# Patient Record
Sex: MALE | Race: ASIAN | NOT HISPANIC OR LATINO | URBAN - METROPOLITAN AREA
[De-identification: names, ages, dates, MRNs, and addresses within clinical notes are randomized per-mention and may not be internally consistent; named-entity substitution may affect disease eponyms.]

---

## 2020-01-14 ENCOUNTER — EMERGENCY (EMERGENCY)
Facility: HOSPITAL | Age: 80
LOS: 1 days | Discharge: HOME | End: 2020-01-14
Attending: EMERGENCY MEDICINE | Admitting: EMERGENCY MEDICINE
Payer: MEDICAID

## 2020-01-14 VITALS
HEART RATE: 81 BPM | RESPIRATION RATE: 17 BRPM | TEMPERATURE: 98 F | OXYGEN SATURATION: 99 % | DIASTOLIC BLOOD PRESSURE: 66 MMHG | SYSTOLIC BLOOD PRESSURE: 169 MMHG

## 2020-01-14 VITALS
TEMPERATURE: 98 F | RESPIRATION RATE: 16 BRPM | HEART RATE: 89 BPM | OXYGEN SATURATION: 100 % | DIASTOLIC BLOOD PRESSURE: 75 MMHG | SYSTOLIC BLOOD PRESSURE: 176 MMHG

## 2020-01-14 LAB
ALBUMIN SERPL ELPH-MCNC: 3.9 G/DL — SIGNIFICANT CHANGE UP (ref 3.3–5)
ALP SERPL-CCNC: 64 U/L — SIGNIFICANT CHANGE UP (ref 40–120)
ALT FLD-CCNC: 6 U/L — SIGNIFICANT CHANGE UP (ref 4–41)
ANION GAP SERPL CALC-SCNC: 17 MMO/L — HIGH (ref 7–14)
AST SERPL-CCNC: 13 U/L — SIGNIFICANT CHANGE UP (ref 4–40)
BASOPHILS # BLD AUTO: 0.03 K/UL — SIGNIFICANT CHANGE UP (ref 0–0.2)
BASOPHILS NFR BLD AUTO: 0.5 % — SIGNIFICANT CHANGE UP (ref 0–2)
BILIRUB SERPL-MCNC: 0.4 MG/DL — SIGNIFICANT CHANGE UP (ref 0.2–1.2)
BUN SERPL-MCNC: 13 MG/DL — SIGNIFICANT CHANGE UP (ref 7–23)
CALCIUM SERPL-MCNC: 9.5 MG/DL — SIGNIFICANT CHANGE UP (ref 8.4–10.5)
CHLORIDE SERPL-SCNC: 100 MMOL/L — SIGNIFICANT CHANGE UP (ref 98–107)
CO2 SERPL-SCNC: 23 MMOL/L — SIGNIFICANT CHANGE UP (ref 22–31)
CREAT SERPL-MCNC: 0.91 MG/DL — SIGNIFICANT CHANGE UP (ref 0.5–1.3)
EOSINOPHIL # BLD AUTO: 0.28 K/UL — SIGNIFICANT CHANGE UP (ref 0–0.5)
EOSINOPHIL NFR BLD AUTO: 4.3 % — SIGNIFICANT CHANGE UP (ref 0–6)
GLUCOSE SERPL-MCNC: 254 MG/DL — HIGH (ref 70–99)
HCT VFR BLD CALC: 39.9 % — SIGNIFICANT CHANGE UP (ref 39–50)
HGB BLD-MCNC: 13.5 G/DL — SIGNIFICANT CHANGE UP (ref 13–17)
IMM GRANULOCYTES NFR BLD AUTO: 0.3 % — SIGNIFICANT CHANGE UP (ref 0–1.5)
LYMPHOCYTES # BLD AUTO: 1.05 K/UL — SIGNIFICANT CHANGE UP (ref 1–3.3)
LYMPHOCYTES # BLD AUTO: 16.3 % — SIGNIFICANT CHANGE UP (ref 13–44)
MCHC RBC-ENTMCNC: 28.8 PG — SIGNIFICANT CHANGE UP (ref 27–34)
MCHC RBC-ENTMCNC: 33.8 % — SIGNIFICANT CHANGE UP (ref 32–36)
MCV RBC AUTO: 85.3 FL — SIGNIFICANT CHANGE UP (ref 80–100)
MONOCYTES # BLD AUTO: 0.85 K/UL — SIGNIFICANT CHANGE UP (ref 0–0.9)
MONOCYTES NFR BLD AUTO: 13.2 % — SIGNIFICANT CHANGE UP (ref 2–14)
NEUTROPHILS # BLD AUTO: 4.23 K/UL — SIGNIFICANT CHANGE UP (ref 1.8–7.4)
NEUTROPHILS NFR BLD AUTO: 65.4 % — SIGNIFICANT CHANGE UP (ref 43–77)
NRBC # FLD: 0 K/UL — SIGNIFICANT CHANGE UP (ref 0–0)
PLATELET # BLD AUTO: 193 K/UL — SIGNIFICANT CHANGE UP (ref 150–400)
PMV BLD: 9.6 FL — SIGNIFICANT CHANGE UP (ref 7–13)
POTASSIUM SERPL-MCNC: 3.3 MMOL/L — LOW (ref 3.5–5.3)
POTASSIUM SERPL-SCNC: 3.3 MMOL/L — LOW (ref 3.5–5.3)
PROT SERPL-MCNC: 6.8 G/DL — SIGNIFICANT CHANGE UP (ref 6–8.3)
RBC # BLD: 4.68 M/UL — SIGNIFICANT CHANGE UP (ref 4.2–5.8)
RBC # FLD: 13.8 % — SIGNIFICANT CHANGE UP (ref 10.3–14.5)
SODIUM SERPL-SCNC: 140 MMOL/L — SIGNIFICANT CHANGE UP (ref 135–145)
WBC # BLD: 6.46 K/UL — SIGNIFICANT CHANGE UP (ref 3.8–10.5)
WBC # FLD AUTO: 6.46 K/UL — SIGNIFICANT CHANGE UP (ref 3.8–10.5)

## 2020-01-14 PROCEDURE — 70450 CT HEAD/BRAIN W/O DYE: CPT | Mod: 26

## 2020-01-14 PROCEDURE — 99284 EMERGENCY DEPT VISIT MOD MDM: CPT

## 2020-01-14 RX ORDER — SODIUM CHLORIDE 9 MG/ML
1000 INJECTION INTRAMUSCULAR; INTRAVENOUS; SUBCUTANEOUS ONCE
Refills: 0 | Status: COMPLETED | OUTPATIENT
Start: 2020-01-14 | End: 2020-01-14

## 2020-01-14 RX ORDER — POTASSIUM CHLORIDE 20 MEQ
40 PACKET (EA) ORAL ONCE
Refills: 0 | Status: COMPLETED | OUTPATIENT
Start: 2020-01-14 | End: 2020-01-14

## 2020-01-14 RX ADMIN — Medication 40 MILLIEQUIVALENT(S): at 17:43

## 2020-01-14 RX ADMIN — SODIUM CHLORIDE 1000 MILLILITER(S): 9 INJECTION INTRAMUSCULAR; INTRAVENOUS; SUBCUTANEOUS at 17:43

## 2020-01-14 NOTE — ED PROVIDER NOTE - PATIENT PORTAL LINK FT
You can access the FollowMyHealth Patient Portal offered by [facility] by registering at the following website: [email or web address]. By joining [facility]’s FollowMyHealth portal, you will also be able to view your health information using other applications (apps) compatible with our system.

## 2020-01-14 NOTE — ED PROVIDER NOTE - PROGRESS NOTE DETAILS
Ct head normal. Labs notable for slight hypokalemia which was repleted orally. Discussed f/u with PMD for tremor, diabetes as well as possible peripheral vascular disease. Pt ambulating with cane. To be dc/d   Ismael Bhat M.D. PGY-2

## 2020-01-14 NOTE — ED PROVIDER NOTE - NSFOLLOWUPCLINICS_GEN_ALL_ED_FT
[facility]  Neurology  [address] [place] [address] [address]rd [place] [address]  Phone: [phone]  Fax:   Follow Up Time:

## 2020-01-14 NOTE — ED ADULT NURSE NOTE - CHIEF COMPLAINT QUOTE
pt arrives from [facility] 1 week ago, c/o feeling unbalanced. pt states he was treated for dengue fever and has been "unbalanced" since. states also having pain to b/l feet.

## 2020-01-14 NOTE — ED PROVIDER NOTE - NS ED ROS FT
REVIEW OF SYSTEMS:  General:  +generalized weakness. no fever, no chills  HEENT: no headache, no vision changes  Cardiac: no chest pain, no palpitations  Respiratory: no cough, no shortness of breath  Gastrointestinal: no abdominal pain, no nausea, no vomiting, no diarrhea  Genitourinary: no hematuria, no dysuria, no urinary frequency  Extremities: no extremity swelling, +bilateral LE pain   Neuro: no focal weakness, no numbness/tingling of the extremities, no decreased sensation  Heme: no easy bleeding, no easy bruising  Skin: no jaundice,  no rashes, no lesions  All other ROS as documented in HPI  -Ismael Bhat, PGY-2

## 2020-01-14 NOTE — ED PROVIDER NOTE - PHYSICAL EXAMINATION
General: Well developed, well nourished  HEENT: Normocephalic and atraumatic, EOMI, Trachea midline.   Cardiac: Normal S1 and S2 w/ RRR. No MRG.  Pulmonary: CTA bilaterally. No increased WOB.   Abdominal: Soft, NTND  Neurologic: Bilateral essential tremor. CN II-XII intact. Muscle strength 5/5 in all extremeties. Finger to nose nl, Heel to shin nl, tandem gait nl   Musculoskeletal: No limited ROM.  Vascular: Hairloss on lower extremities bilateral. PT pulse 2+ and equal bilaterally.   Skin: Color appropriate for race.   Psychiatric: Appropriate mood and affect. No apparent risk to self or others.  Ismael Bhat M.D. PGY-2

## 2020-01-14 NOTE — ED ADULT TRIAGE NOTE - CHIEF COMPLAINT QUOTE
pt arrives from [facility] 1 week ago, c/o feeling unbalanced. pt states he was treated for dengue fever and has been "unbalanced" since. states also having pain to b/l feet.

## 2020-01-14 NOTE — ED PROVIDER NOTE - ATTENDING CONTRIBUTION TO CARE
I performed a history and physical exam of the patient and discussed their management with the resident and /or advanced care provider. I reviewed the resident and /or ACP's note and agree with the documented findings and plan of care. My medical decision making and observations are found above.  Lungs clear, abd soft, walks with knee pain.

## 2020-01-14 NOTE — ED PROVIDER NOTE - CLINICAL SUMMARY MEDICAL DECISION MAKING FREE TEXT BOX
Floridalma: weakness, bilateral knee pain, hands shaking all worse over last 3 weeks. + fall with head trauma. hx of dm , htn, arthritis. will get head ct looking for subdural, check labs. treat pain. if all neg he will be referred to neuro and pmd.

## 2020-01-14 NOTE — ED PROVIDER NOTE - OBJECTIVE STATEMENT
79M PMH HTN, DM p/w bilateral lower leg pain and gait instability. Pt states he was living in [facility] and 2 months ago had a prolonged hospitalization for Dengue. Pt and family state that after hospitalization he has never returned to baseline. He has been having increasing difficulty walking (due to pain and off balance). Pt was seen by neurology in [facility] and was told "he had a small stroke". Pt states current symptoms have lasted 3 weeks. States when he ambulated for a few yards he has a burning sensation in his bilateral lower legs. 79M PMH HTN, DM p/w bilateral lower leg pain and gait instability. Pt states he was living in [facility] and 2 months ago had a prolonged hospitalization for Dengue. Pt and family state that after hospitalization he has never returned to baseline. He has been having increasing difficulty walking (due to pain and off balance). Pt was seen by neurology in [facility] and was told "he had a small stroke". Pt states current symptoms have lasted 3 weeks. States when he ambulated for a few yards he has a burning sensation in his bilateral lower legs. Pt reports a few days ago he had a fall and hit his head. No LOC.

## 2020-01-14 NOTE — ED ADULT NURSE NOTE - NSIMPLEMENTINTERV_GEN_ALL_ED
Implemented All Fall Risk Interventions:  [place] to call system. Call bell, personal items and telephone within reach. Instruct patient to call for assistance. Room bathroom lighting operational. Non-slip footwear when patient is off stretcher. Physically safe environment: no spills, clutter or unnecessary equipment. Stretcher in lowest position, wheels locked, appropriate side rails in place. Provide visual cue, wrist band, yellow gown, etc. Monitor gait and stability. Monitor for mental status changes and reorient to person, place, and time. Review medications for side effects contributing to fall risk. Reinforce activity limits and safety measures with patient and family.

## 2020-01-14 NOTE — ED PROVIDER NOTE - NSFOLLOWUPINSTRUCTIONS_ED_ALL_ED_FT
1. TAKE ALL MEDICATIONS AS DIRECTED.    2. FOR PAIN OR FEVER YOU CAN TAKE ACETAMINOPHEN (TYLENOL) AS NEEDED, AS DIRECTED ON PACKAGING.  3. FOLLOW UP WITH YOUR PRIMARY DOCTOR WITHIN 5 DAYS AS DIRECTED.  4. IF YOU HAD LABS OR IMAGING DONE, YOU WERE GIVEN COPIES OF ALL LABS AND/OR IMAGING RESULTS FROM YOUR ER VISIT--PLEASE TAKE THEM WITH YOU TO YOUR FOLLOW UP APPOINTMENTS.  5. IF NEEDED, CALL PATIENT ACCESS SERVICES AT [phone] TO FIND A PRIMARY CARE PHYSICIAN.  OR CALL [phone] TO MAKE AN APPOINTMENT WITH THE CLINIC.  6. RETURN TO THE ER FOR ANY WORSENING SYMPTOMS OR CONCERNS.     Please follow up with a neurologist as well for your tremor and imbalance.    If your PMD thinks you would benefit from seeing a vascular surgeon, please follow up with them as well.

## 2022-01-01 ENCOUNTER — TRANSCRIPTION ENCOUNTER (OUTPATIENT)
Age: 82
End: 2022-01-01

## 2022-01-01 ENCOUNTER — RESULT REVIEW (OUTPATIENT)
Age: 82
End: 2022-01-01

## 2022-01-01 ENCOUNTER — APPOINTMENT (OUTPATIENT)
Dept: HEMATOLOGY ONCOLOGY | Facility: CLINIC | Age: 82
End: 2022-01-01

## 2022-01-01 ENCOUNTER — NON-APPOINTMENT (OUTPATIENT)
Age: 82
End: 2022-01-01

## 2022-01-01 ENCOUNTER — EMERGENCY (EMERGENCY)
Facility: HOSPITAL | Age: 82
LOS: 1 days | Discharge: LEFT BEFORE DISCHARGE | End: 2022-01-01
Admitting: EMERGENCY MEDICINE

## 2022-01-01 ENCOUNTER — INPATIENT (INPATIENT)
Facility: HOSPITAL | Age: 82
LOS: 7 days | Discharge: HOME HEALTH | End: 2022-12-15
Attending: HOSPITALIST | Admitting: HOSPITALIST

## 2022-01-01 ENCOUNTER — OUTPATIENT (OUTPATIENT)
Dept: OUTPATIENT SERVICES | Facility: HOSPITAL | Age: 82
LOS: 1 days | Discharge: HOME | End: 2022-01-01

## 2022-01-01 ENCOUNTER — APPOINTMENT (OUTPATIENT)
Dept: PULMONOLOGY | Facility: CLINIC | Age: 82
End: 2022-01-01

## 2022-01-01 VITALS
RESPIRATION RATE: 16 BRPM | HEIGHT: 67 IN | WEIGHT: 114.99 LBS | TEMPERATURE: 97.2 F | HEART RATE: 77 BPM | DIASTOLIC BLOOD PRESSURE: 66 MMHG | OXYGEN SATURATION: 96 % | BODY MASS INDEX: 18.05 KG/M2 | SYSTOLIC BLOOD PRESSURE: 137 MMHG

## 2022-01-01 VITALS
WEIGHT: 120 LBS | OXYGEN SATURATION: 96 % | DIASTOLIC BLOOD PRESSURE: 75 MMHG | TEMPERATURE: 97.8 F | RESPIRATION RATE: 17 BRPM | HEART RATE: 96 BPM | SYSTOLIC BLOOD PRESSURE: 161 MMHG

## 2022-01-01 VITALS — HEART RATE: 95 BPM

## 2022-01-01 VITALS
HEART RATE: 96 BPM | RESPIRATION RATE: 18 BRPM | TEMPERATURE: 98 F | DIASTOLIC BLOOD PRESSURE: 72 MMHG | SYSTOLIC BLOOD PRESSURE: 176 MMHG | OXYGEN SATURATION: 90 %

## 2022-01-01 VITALS
DIASTOLIC BLOOD PRESSURE: 69 MMHG | OXYGEN SATURATION: 94 % | RESPIRATION RATE: 22 BRPM | HEART RATE: 92 BPM | TEMPERATURE: 98 F | SYSTOLIC BLOOD PRESSURE: 154 MMHG

## 2022-01-01 VITALS
RESPIRATION RATE: 20 BRPM | HEART RATE: 89 BPM | SYSTOLIC BLOOD PRESSURE: 165 MMHG | OXYGEN SATURATION: 98 % | DIASTOLIC BLOOD PRESSURE: 80 MMHG

## 2022-01-01 DIAGNOSIS — J90 PLEURAL EFFUSION, NOT ELSEWHERE CLASSIFIED: ICD-10-CM

## 2022-01-01 DIAGNOSIS — C34.90 MALIGNANT NEOPLASM OF UNSPECIFIED PART OF UNSPECIFIED BRONCHUS OR LUNG: ICD-10-CM

## 2022-01-01 DIAGNOSIS — R91.8 OTHER NONSPECIFIC ABNORMAL FINDING OF LUNG FIELD: ICD-10-CM
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
## 2022-01-01 DIAGNOSIS — E11.9 TYPE 2 DIABETES MELLITUS WITHOUT COMPLICATIONS: ICD-10-CM

## 2022-01-01 DIAGNOSIS — Z29.9 ENCOUNTER FOR PROPHYLACTIC MEASURES, UNSPECIFIED: ICD-10-CM

## 2022-01-01 DIAGNOSIS — E11.9 TYPE 2 DIABETES MELLITUS W/OUT COMPLICATIONS: ICD-10-CM

## 2022-01-01 DIAGNOSIS — R06.02 SHORTNESS OF BREATH: ICD-10-CM

## 2022-01-01 DIAGNOSIS — J96.01 ACUTE RESPIRATORY FAILURE WITH HYPOXIA: ICD-10-CM

## 2022-01-01 DIAGNOSIS — E78.5 HYPERLIPIDEMIA, UNSPECIFIED: ICD-10-CM

## 2022-01-01 DIAGNOSIS — I10 ESSENTIAL (PRIMARY) HYPERTENSION: ICD-10-CM

## 2022-01-01 DIAGNOSIS — Z87.891 PERSONAL HISTORY OF NICOTINE DEPENDENCE: ICD-10-CM

## 2022-01-01 LAB
A1C WITH ESTIMATED AVERAGE GLUCOSE RESULT: 5.7 % — HIGH (ref 4–5.6)
ALBUMIN FLD-MCNC: 2.9 G/DL — SIGNIFICANT CHANGE UP
ALBUMIN SERPL ELPH-MCNC: 3.1 G/DL — LOW (ref 3.3–5)
ALBUMIN SERPL ELPH-MCNC: 3.2 G/DL — LOW (ref 3.3–5)
ALBUMIN SERPL ELPH-MCNC: 3.8 G/DL — SIGNIFICANT CHANGE UP (ref 3.3–5)
ALBUMIN SERPL ELPH-MCNC: 3.9 G/DL — SIGNIFICANT CHANGE UP (ref 3.3–5)
ALP SERPL-CCNC: 70 U/L — SIGNIFICANT CHANGE UP (ref 40–120)
ALP SERPL-CCNC: 74 U/L — SIGNIFICANT CHANGE UP (ref 40–120)
ALP SERPL-CCNC: 78 U/L — SIGNIFICANT CHANGE UP (ref 40–120)
ALP SERPL-CCNC: 81 U/L — SIGNIFICANT CHANGE UP (ref 40–120)
ALT FLD-CCNC: 11 U/L — SIGNIFICANT CHANGE UP (ref 4–41)
ALT FLD-CCNC: 5 U/L — SIGNIFICANT CHANGE UP (ref 4–41)
ALT FLD-CCNC: 7 U/L — SIGNIFICANT CHANGE UP (ref 4–41)
ALT FLD-CCNC: 9 U/L — SIGNIFICANT CHANGE UP (ref 4–41)
ANION GAP SERPL CALC-SCNC: 10 MMOL/L — SIGNIFICANT CHANGE UP (ref 7–14)
ANION GAP SERPL CALC-SCNC: 11 MMOL/L — SIGNIFICANT CHANGE UP (ref 7–14)
ANION GAP SERPL CALC-SCNC: 12 MMOL/L — SIGNIFICANT CHANGE UP (ref 7–14)
ANION GAP SERPL CALC-SCNC: 12 MMOL/L — SIGNIFICANT CHANGE UP (ref 7–14)
ANION GAP SERPL CALC-SCNC: 14 MMOL/L — SIGNIFICANT CHANGE UP (ref 7–14)
ANION GAP SERPL CALC-SCNC: 9 MMOL/L — SIGNIFICANT CHANGE UP (ref 7–14)
APTT BLD: 54.1 SEC — HIGH (ref 27–36.3)
APTT BLD: 55.3 SEC — HIGH (ref 27–36.3)
AST SERPL-CCNC: 14 U/L — SIGNIFICANT CHANGE UP (ref 4–40)
AST SERPL-CCNC: 15 U/L — SIGNIFICANT CHANGE UP (ref 4–40)
AST SERPL-CCNC: 17 U/L — SIGNIFICANT CHANGE UP (ref 4–40)
AST SERPL-CCNC: 19 U/L — SIGNIFICANT CHANGE UP (ref 4–40)
B PERT IGG+IGM PNL SER: CLEAR — SIGNIFICANT CHANGE UP
BASOPHILS # BLD AUTO: 0.03 K/UL — SIGNIFICANT CHANGE UP (ref 0–0.2)
BASOPHILS # BLD AUTO: 0.03 K/UL — SIGNIFICANT CHANGE UP (ref 0–0.2)
BASOPHILS # BLD AUTO: 0.04 K/UL — SIGNIFICANT CHANGE UP (ref 0–0.2)
BASOPHILS NFR BLD AUTO: 0.3 % — SIGNIFICANT CHANGE UP (ref 0–2)
BASOPHILS NFR BLD AUTO: 0.4 % — SIGNIFICANT CHANGE UP (ref 0–2)
BASOPHILS NFR BLD AUTO: 0.5 % — SIGNIFICANT CHANGE UP (ref 0–2)
BILIRUB SERPL-MCNC: 0.6 MG/DL — SIGNIFICANT CHANGE UP (ref 0.2–1.2)
BILIRUB SERPL-MCNC: 0.6 MG/DL — SIGNIFICANT CHANGE UP (ref 0.2–1.2)
BILIRUB SERPL-MCNC: 0.8 MG/DL — SIGNIFICANT CHANGE UP (ref 0.2–1.2)
BILIRUB SERPL-MCNC: 0.9 MG/DL — SIGNIFICANT CHANGE UP (ref 0.2–1.2)
BLD GP AB SCN SERPL QL: NEGATIVE — SIGNIFICANT CHANGE UP
BLD GP AB SCN SERPL QL: NEGATIVE — SIGNIFICANT CHANGE UP
BUN SERPL-MCNC: 15 MG/DL — SIGNIFICANT CHANGE UP (ref 7–23)
BUN SERPL-MCNC: 15 MG/DL — SIGNIFICANT CHANGE UP (ref 7–23)
BUN SERPL-MCNC: 16 MG/DL — SIGNIFICANT CHANGE UP (ref 7–23)
BUN SERPL-MCNC: 17 MG/DL — SIGNIFICANT CHANGE UP (ref 7–23)
BUN SERPL-MCNC: 19 MG/DL — SIGNIFICANT CHANGE UP (ref 7–23)
BUN SERPL-MCNC: 21 MG/DL — SIGNIFICANT CHANGE UP (ref 7–23)
BUN SERPL-MCNC: 21 MG/DL — SIGNIFICANT CHANGE UP (ref 7–23)
CALCIUM SERPL-MCNC: 8.6 MG/DL — SIGNIFICANT CHANGE UP (ref 8.4–10.5)
CALCIUM SERPL-MCNC: 8.7 MG/DL — SIGNIFICANT CHANGE UP (ref 8.4–10.5)
CALCIUM SERPL-MCNC: 8.7 MG/DL — SIGNIFICANT CHANGE UP (ref 8.4–10.5)
CALCIUM SERPL-MCNC: 8.8 MG/DL — SIGNIFICANT CHANGE UP (ref 8.4–10.5)
CALCIUM SERPL-MCNC: 8.8 MG/DL — SIGNIFICANT CHANGE UP (ref 8.4–10.5)
CALCIUM SERPL-MCNC: 8.9 MG/DL — SIGNIFICANT CHANGE UP (ref 8.4–10.5)
CALCIUM SERPL-MCNC: 9.2 MG/DL — SIGNIFICANT CHANGE UP (ref 8.4–10.5)
CALCIUM SERPL-MCNC: 9.4 MG/DL — SIGNIFICANT CHANGE UP (ref 8.4–10.5)
CALCIUM SERPL-MCNC: 9.8 MG/DL — SIGNIFICANT CHANGE UP (ref 8.4–10.5)
CHLORIDE SERPL-SCNC: 101 MMOL/L — SIGNIFICANT CHANGE UP (ref 98–107)
CHLORIDE SERPL-SCNC: 102 MMOL/L — SIGNIFICANT CHANGE UP (ref 98–107)
CHLORIDE SERPL-SCNC: 104 MMOL/L — SIGNIFICANT CHANGE UP (ref 98–107)
CHLORIDE SERPL-SCNC: 105 MMOL/L — SIGNIFICANT CHANGE UP (ref 98–107)
CHLORIDE SERPL-SCNC: 105 MMOL/L — SIGNIFICANT CHANGE UP (ref 98–107)
CHLORIDE SERPL-SCNC: 106 MMOL/L — SIGNIFICANT CHANGE UP (ref 98–107)
CO2 SERPL-SCNC: 20 MMOL/L — LOW (ref 22–31)
CO2 SERPL-SCNC: 21 MMOL/L — LOW (ref 22–31)
CO2 SERPL-SCNC: 22 MMOL/L — SIGNIFICANT CHANGE UP (ref 22–31)
CO2 SERPL-SCNC: 22 MMOL/L — SIGNIFICANT CHANGE UP (ref 22–31)
CO2 SERPL-SCNC: 23 MMOL/L — SIGNIFICANT CHANGE UP (ref 22–31)
COLOR FLD: YELLOW
CREAT SERPL-MCNC: 0.81 MG/DL — SIGNIFICANT CHANGE UP (ref 0.5–1.3)
CREAT SERPL-MCNC: 0.82 MG/DL — SIGNIFICANT CHANGE UP (ref 0.5–1.3)
CREAT SERPL-MCNC: 0.89 MG/DL — SIGNIFICANT CHANGE UP (ref 0.5–1.3)
CREAT SERPL-MCNC: 0.92 MG/DL — SIGNIFICANT CHANGE UP (ref 0.5–1.3)
CREAT SERPL-MCNC: 0.93 MG/DL — SIGNIFICANT CHANGE UP (ref 0.5–1.3)
CREAT SERPL-MCNC: 1.04 MG/DL — SIGNIFICANT CHANGE UP (ref 0.5–1.3)
CREAT SERPL-MCNC: 1.06 MG/DL — SIGNIFICANT CHANGE UP (ref 0.5–1.3)
CREAT SERPL-MCNC: 1.13 MG/DL — SIGNIFICANT CHANGE UP (ref 0.5–1.3)
CREAT SERPL-MCNC: 1.17 MG/DL — SIGNIFICANT CHANGE UP (ref 0.5–1.3)
CULTURE RESULTS: SIGNIFICANT CHANGE UP
EGFR: 62 ML/MIN/1.73M2 — SIGNIFICANT CHANGE UP
EGFR: 64 ML/MIN/1.73M2 — SIGNIFICANT CHANGE UP
EGFR: 70 ML/MIN/1.73M2 — SIGNIFICANT CHANGE UP
EGFR: 71 ML/MIN/1.73M2 — SIGNIFICANT CHANGE UP
EGFR: 81 ML/MIN/1.73M2 — SIGNIFICANT CHANGE UP
EGFR: 83 ML/MIN/1.73M2 — SIGNIFICANT CHANGE UP
EGFR: 85 ML/MIN/1.73M2 — SIGNIFICANT CHANGE UP
EGFR: 87 ML/MIN/1.73M2 — SIGNIFICANT CHANGE UP
EGFR: 87 ML/MIN/1.73M2 — SIGNIFICANT CHANGE UP
EOSINOPHIL # BLD AUTO: 0.19 K/UL — SIGNIFICANT CHANGE UP (ref 0–0.5)
EOSINOPHIL # BLD AUTO: 0.34 K/UL — SIGNIFICANT CHANGE UP (ref 0–0.5)
EOSINOPHIL # BLD AUTO: 0.42 K/UL — SIGNIFICANT CHANGE UP (ref 0–0.5)
EOSINOPHIL NFR BLD AUTO: 1.7 % — SIGNIFICANT CHANGE UP (ref 0–6)
EOSINOPHIL NFR BLD AUTO: 4.3 % — SIGNIFICANT CHANGE UP (ref 0–6)
EOSINOPHIL NFR BLD AUTO: 5.1 % — SIGNIFICANT CHANGE UP (ref 0–6)
ESTIMATED AVERAGE GLUCOSE: 117 — SIGNIFICANT CHANGE UP
FLUAV AG NPH QL: SIGNIFICANT CHANGE UP
FLUBV AG NPH QL: SIGNIFICANT CHANGE UP
FLUID INTAKE SUBSTANCE CLASS: SIGNIFICANT CHANGE UP
GLUCOSE BLDC GLUCOMTR-MCNC: 113 MG/DL — HIGH (ref 70–99)
GLUCOSE BLDC GLUCOMTR-MCNC: 120 MG/DL — HIGH (ref 70–99)
GLUCOSE BLDC GLUCOMTR-MCNC: 131 MG/DL — HIGH (ref 70–99)
GLUCOSE BLDC GLUCOMTR-MCNC: 133 MG/DL — HIGH (ref 70–99)
GLUCOSE BLDC GLUCOMTR-MCNC: 155 MG/DL — HIGH (ref 70–99)
GLUCOSE BLDC GLUCOMTR-MCNC: 156 MG/DL — HIGH (ref 70–99)
GLUCOSE BLDC GLUCOMTR-MCNC: 176 MG/DL — HIGH (ref 70–99)
GLUCOSE BLDC GLUCOMTR-MCNC: 178 MG/DL — HIGH (ref 70–99)
GLUCOSE BLDC GLUCOMTR-MCNC: 182 MG/DL — HIGH (ref 70–99)
GLUCOSE BLDC GLUCOMTR-MCNC: 187 MG/DL — HIGH (ref 70–99)
GLUCOSE BLDC GLUCOMTR-MCNC: 192 MG/DL — HIGH (ref 70–99)
GLUCOSE BLDC GLUCOMTR-MCNC: 201 MG/DL — HIGH (ref 70–99)
GLUCOSE BLDC GLUCOMTR-MCNC: 212 MG/DL — HIGH (ref 70–99)
GLUCOSE BLDC GLUCOMTR-MCNC: 226 MG/DL — HIGH (ref 70–99)
GLUCOSE BLDC GLUCOMTR-MCNC: 230 MG/DL — HIGH (ref 70–99)
GLUCOSE BLDC GLUCOMTR-MCNC: 242 MG/DL — HIGH (ref 70–99)
GLUCOSE BLDC GLUCOMTR-MCNC: 256 MG/DL — HIGH (ref 70–99)
GLUCOSE BLDC GLUCOMTR-MCNC: 259 MG/DL — HIGH (ref 70–99)
GLUCOSE BLDC GLUCOMTR-MCNC: 263 MG/DL — HIGH (ref 70–99)
GLUCOSE BLDC GLUCOMTR-MCNC: 267 MG/DL — HIGH (ref 70–99)
GLUCOSE BLDC GLUCOMTR-MCNC: 270 MG/DL — HIGH (ref 70–99)
GLUCOSE BLDC GLUCOMTR-MCNC: 284 MG/DL — HIGH (ref 70–99)
GLUCOSE BLDC GLUCOMTR-MCNC: 287 MG/DL — HIGH (ref 70–99)
GLUCOSE BLDC GLUCOMTR-MCNC: 290 MG/DL — HIGH (ref 70–99)
GLUCOSE BLDC GLUCOMTR-MCNC: 294 MG/DL — HIGH (ref 70–99)
GLUCOSE BLDC GLUCOMTR-MCNC: 296 MG/DL — HIGH (ref 70–99)
GLUCOSE BLDC GLUCOMTR-MCNC: 314 MG/DL — HIGH (ref 70–99)
GLUCOSE BLDC GLUCOMTR-MCNC: 314 MG/DL — HIGH (ref 70–99)
GLUCOSE BLDC GLUCOMTR-MCNC: 324 MG/DL — HIGH (ref 70–99)
GLUCOSE BLDC GLUCOMTR-MCNC: 331 MG/DL — HIGH (ref 70–99)
GLUCOSE BLDC GLUCOMTR-MCNC: 354 MG/DL — HIGH (ref 70–99)
GLUCOSE BLDC GLUCOMTR-MCNC: 361 MG/DL — HIGH (ref 70–99)
GLUCOSE BLDC GLUCOMTR-MCNC: 97 MG/DL — SIGNIFICANT CHANGE UP (ref 70–99)
GLUCOSE FLD-MCNC: 179 MG/DL — SIGNIFICANT CHANGE UP
GLUCOSE SERPL-MCNC: 113 MG/DL — HIGH (ref 70–99)
GLUCOSE SERPL-MCNC: 122 MG/DL — HIGH (ref 70–99)
GLUCOSE SERPL-MCNC: 124 MG/DL — HIGH (ref 70–99)
GLUCOSE SERPL-MCNC: 135 MG/DL — HIGH (ref 70–99)
GLUCOSE SERPL-MCNC: 144 MG/DL — HIGH (ref 70–99)
GLUCOSE SERPL-MCNC: 162 MG/DL — HIGH (ref 70–99)
GLUCOSE SERPL-MCNC: 162 MG/DL — HIGH (ref 70–99)
GLUCOSE SERPL-MCNC: 183 MG/DL — HIGH (ref 70–99)
GLUCOSE SERPL-MCNC: 203 MG/DL — HIGH (ref 70–99)
GRAM STN FLD: SIGNIFICANT CHANGE UP
HCT VFR BLD CALC: 36.8 % — LOW (ref 39–50)
HCT VFR BLD CALC: 37.8 % — LOW (ref 39–50)
HCT VFR BLD CALC: 38.9 % — LOW (ref 39–50)
HCT VFR BLD CALC: 39 % — SIGNIFICANT CHANGE UP (ref 39–50)
HCT VFR BLD CALC: 39.1 % — SIGNIFICANT CHANGE UP (ref 39–50)
HCT VFR BLD CALC: 39.4 % — SIGNIFICANT CHANGE UP (ref 39–50)
HCT VFR BLD CALC: 39.4 % — SIGNIFICANT CHANGE UP (ref 39–50)
HCT VFR BLD CALC: 39.5 % — SIGNIFICANT CHANGE UP (ref 39–50)
HCT VFR BLD CALC: 39.5 % — SIGNIFICANT CHANGE UP (ref 39–50)
HCT VFR BLD CALC: 40.2 % — SIGNIFICANT CHANGE UP (ref 39–50)
HGB BLD-MCNC: 12.5 G/DL — LOW (ref 13–17)
HGB BLD-MCNC: 12.6 G/DL — LOW (ref 13–17)
HGB BLD-MCNC: 12.8 G/DL — LOW (ref 13–17)
HGB BLD-MCNC: 13 G/DL — SIGNIFICANT CHANGE UP (ref 13–17)
HGB BLD-MCNC: 13.2 G/DL — SIGNIFICANT CHANGE UP (ref 13–17)
HGB BLD-MCNC: 13.2 G/DL — SIGNIFICANT CHANGE UP (ref 13–17)
HGB BLD-MCNC: 13.3 G/DL — SIGNIFICANT CHANGE UP (ref 13–17)
HGB BLD-MCNC: 13.6 G/DL — SIGNIFICANT CHANGE UP (ref 13–17)
HGB BLD-MCNC: 13.6 G/DL — SIGNIFICANT CHANGE UP (ref 13–17)
HGB BLD-MCNC: 13.7 G/DL — SIGNIFICANT CHANGE UP (ref 13–17)
IANC: 5.63 K/UL — SIGNIFICANT CHANGE UP (ref 1.8–7.4)
IANC: 5.64 K/UL — SIGNIFICANT CHANGE UP (ref 1.8–7.4)
IMM GRANULOCYTES NFR BLD AUTO: 0.3 % — SIGNIFICANT CHANGE UP (ref 0–0.9)
IMM GRANULOCYTES NFR BLD AUTO: 0.4 % — SIGNIFICANT CHANGE UP (ref 0–0.9)
IMM GRANULOCYTES NFR BLD AUTO: 0.4 % — SIGNIFICANT CHANGE UP (ref 0–0.9)
INR BLD: 1.19 RATIO — HIGH (ref 0.88–1.16)
INR BLD: 1.2 RATIO — HIGH (ref 0.88–1.16)
LDH SERPL L TO P-CCNC: 209 U/L — SIGNIFICANT CHANGE UP
LDH SERPL L TO P-CCNC: 228 U/L — HIGH (ref 135–225)
LYMPHOCYTES # BLD AUTO: 0.75 K/UL — LOW (ref 1–3.3)
LYMPHOCYTES # BLD AUTO: 1.06 K/UL — SIGNIFICANT CHANGE UP (ref 1–3.3)
LYMPHOCYTES # BLD AUTO: 1.07 K/UL — SIGNIFICANT CHANGE UP (ref 1–3.3)
LYMPHOCYTES # BLD AUTO: 13.1 % — SIGNIFICANT CHANGE UP (ref 13–44)
LYMPHOCYTES # BLD AUTO: 13.3 % — SIGNIFICANT CHANGE UP (ref 13–44)
LYMPHOCYTES # BLD AUTO: 6.6 % — LOW (ref 13–44)
LYMPHOCYTES # FLD: 16 % — SIGNIFICANT CHANGE UP
MAGNESIUM SERPL-MCNC: 1.6 MG/DL — SIGNIFICANT CHANGE UP (ref 1.6–2.6)
MAGNESIUM SERPL-MCNC: 1.6 MG/DL — SIGNIFICANT CHANGE UP (ref 1.6–2.6)
MAGNESIUM SERPL-MCNC: 1.8 MG/DL — SIGNIFICANT CHANGE UP (ref 1.6–2.6)
MAGNESIUM SERPL-MCNC: 1.9 MG/DL — SIGNIFICANT CHANGE UP (ref 1.6–2.6)
MCHC RBC-ENTMCNC: 28.3 PG — SIGNIFICANT CHANGE UP (ref 27–34)
MCHC RBC-ENTMCNC: 28.4 PG — SIGNIFICANT CHANGE UP (ref 27–34)
MCHC RBC-ENTMCNC: 28.7 PG — SIGNIFICANT CHANGE UP (ref 27–34)
MCHC RBC-ENTMCNC: 28.7 PG — SIGNIFICANT CHANGE UP (ref 27–34)
MCHC RBC-ENTMCNC: 28.8 PG — SIGNIFICANT CHANGE UP (ref 27–34)
MCHC RBC-ENTMCNC: 28.8 PG — SIGNIFICANT CHANGE UP (ref 27–34)
MCHC RBC-ENTMCNC: 28.9 PG — SIGNIFICANT CHANGE UP (ref 27–34)
MCHC RBC-ENTMCNC: 29 PG — SIGNIFICANT CHANGE UP (ref 27–34)
MCHC RBC-ENTMCNC: 29.1 PG — SIGNIFICANT CHANGE UP (ref 27–34)
MCHC RBC-ENTMCNC: 29.3 PG — SIGNIFICANT CHANGE UP (ref 27–34)
MCHC RBC-ENTMCNC: 32.9 GM/DL — SIGNIFICANT CHANGE UP (ref 32–36)
MCHC RBC-ENTMCNC: 33 GM/DL — SIGNIFICANT CHANGE UP (ref 32–36)
MCHC RBC-ENTMCNC: 33.3 GM/DL — SIGNIFICANT CHANGE UP (ref 32–36)
MCHC RBC-ENTMCNC: 33.7 GM/DL — SIGNIFICANT CHANGE UP (ref 32–36)
MCHC RBC-ENTMCNC: 33.8 GM/DL — SIGNIFICANT CHANGE UP (ref 32–36)
MCHC RBC-ENTMCNC: 33.8 GM/DL — SIGNIFICANT CHANGE UP (ref 32–36)
MCHC RBC-ENTMCNC: 34 GM/DL — SIGNIFICANT CHANGE UP (ref 32–36)
MCHC RBC-ENTMCNC: 34.1 G/DL — SIGNIFICANT CHANGE UP (ref 32–36)
MCHC RBC-ENTMCNC: 34.4 GM/DL — SIGNIFICANT CHANGE UP (ref 32–36)
MCHC RBC-ENTMCNC: 34.5 GM/DL — SIGNIFICANT CHANGE UP (ref 32–36)
MCV RBC AUTO: 83.9 FL — SIGNIFICANT CHANGE UP (ref 80–100)
MCV RBC AUTO: 84 FL — SIGNIFICANT CHANGE UP (ref 80–100)
MCV RBC AUTO: 84.1 FL — SIGNIFICANT CHANGE UP (ref 80–100)
MCV RBC AUTO: 84.6 FL — SIGNIFICANT CHANGE UP (ref 80–100)
MCV RBC AUTO: 84.9 FL — SIGNIFICANT CHANGE UP (ref 80–100)
MCV RBC AUTO: 84.9 FL — SIGNIFICANT CHANGE UP (ref 80–100)
MCV RBC AUTO: 85.2 FL — SIGNIFICANT CHANGE UP (ref 80–100)
MCV RBC AUTO: 85.5 FL — SIGNIFICANT CHANGE UP (ref 80–100)
MCV RBC AUTO: 88 FL — SIGNIFICANT CHANGE UP (ref 80–100)
MCV RBC AUTO: 88.1 FL — SIGNIFICANT CHANGE UP (ref 80–100)
MESOTHL CELL # FLD: 6 % — SIGNIFICANT CHANGE UP
MONOCYTES # BLD AUTO: 0.9 K/UL — SIGNIFICANT CHANGE UP (ref 0–0.9)
MONOCYTES # BLD AUTO: 0.96 K/UL — HIGH (ref 0–0.9)
MONOCYTES # BLD AUTO: 0.98 K/UL — HIGH (ref 0–0.9)
MONOCYTES NFR BLD AUTO: 11.3 % — SIGNIFICANT CHANGE UP (ref 2–14)
MONOCYTES NFR BLD AUTO: 11.8 % — SIGNIFICANT CHANGE UP (ref 2–14)
MONOCYTES NFR BLD AUTO: 8.6 % — SIGNIFICANT CHANGE UP (ref 2–14)
MRSA PCR RESULT.: SIGNIFICANT CHANGE UP
NEUTROPHILS # BLD AUTO: 5.63 K/UL — SIGNIFICANT CHANGE UP (ref 1.8–7.4)
NEUTROPHILS # BLD AUTO: 5.64 K/UL — SIGNIFICANT CHANGE UP (ref 1.8–7.4)
NEUTROPHILS # BLD AUTO: 9.34 K/UL — HIGH (ref 1.8–7.4)
NEUTROPHILS NFR BLD AUTO: 69.1 % — SIGNIFICANT CHANGE UP (ref 43–77)
NEUTROPHILS NFR BLD AUTO: 70.4 % — SIGNIFICANT CHANGE UP (ref 43–77)
NEUTROPHILS NFR BLD AUTO: 82.4 % — HIGH (ref 43–77)
NEUTROPHILS-BODY FLUID: 72 % — SIGNIFICANT CHANGE UP
NON-GYNECOLOGICAL CYTOLOGY STUDY: SIGNIFICANT CHANGE UP
NRBC # BLD: 0 /100 WBCS — SIGNIFICANT CHANGE UP (ref 0–0)
NRBC # FLD: 0 K/UL — SIGNIFICANT CHANGE UP (ref 0–0)
NT-PROBNP SERPL-SCNC: 289 PG/ML — SIGNIFICANT CHANGE UP
PH FLD: 7.6 — SIGNIFICANT CHANGE UP
PHOSPHATE SERPL-MCNC: 2 MG/DL — LOW (ref 2.5–4.5)
PHOSPHATE SERPL-MCNC: 2.2 MG/DL — LOW (ref 2.5–4.5)
PHOSPHATE SERPL-MCNC: 2.2 MG/DL — LOW (ref 2.5–4.5)
PHOSPHATE SERPL-MCNC: 2.3 MG/DL — LOW (ref 2.5–4.5)
PHOSPHATE SERPL-MCNC: 2.4 MG/DL — LOW (ref 2.5–4.5)
PHOSPHATE SERPL-MCNC: 2.5 MG/DL — SIGNIFICANT CHANGE UP (ref 2.5–4.5)
PHOSPHATE SERPL-MCNC: 2.7 MG/DL — SIGNIFICANT CHANGE UP (ref 2.5–4.5)
PHOSPHATE SERPL-MCNC: 3.2 MG/DL — SIGNIFICANT CHANGE UP (ref 2.5–4.5)
PLATELET # BLD AUTO: 179 K/UL — SIGNIFICANT CHANGE UP (ref 150–400)
PLATELET # BLD AUTO: 180 K/UL — SIGNIFICANT CHANGE UP (ref 150–400)
PLATELET # BLD AUTO: 181 K/UL — SIGNIFICANT CHANGE UP (ref 150–400)
PLATELET # BLD AUTO: 191 K/UL — SIGNIFICANT CHANGE UP (ref 150–400)
PLATELET # BLD AUTO: 193 K/UL — SIGNIFICANT CHANGE UP (ref 150–400)
PLATELET # BLD AUTO: 195 K/UL — SIGNIFICANT CHANGE UP (ref 150–400)
PLATELET # BLD AUTO: 199 K/UL — SIGNIFICANT CHANGE UP (ref 150–400)
PLATELET # BLD AUTO: 200 K/UL — SIGNIFICANT CHANGE UP (ref 150–400)
PLATELET # BLD AUTO: 210 K/UL — SIGNIFICANT CHANGE UP (ref 150–400)
PLATELET # BLD AUTO: 267 K/UL — SIGNIFICANT CHANGE UP (ref 150–400)
POTASSIUM SERPL-MCNC: 3.8 MMOL/L — SIGNIFICANT CHANGE UP (ref 3.5–5.3)
POTASSIUM SERPL-MCNC: 4.1 MMOL/L — SIGNIFICANT CHANGE UP (ref 3.5–5.3)
POTASSIUM SERPL-MCNC: 4.2 MMOL/L — SIGNIFICANT CHANGE UP (ref 3.5–5.3)
POTASSIUM SERPL-MCNC: 4.2 MMOL/L — SIGNIFICANT CHANGE UP (ref 3.5–5.3)
POTASSIUM SERPL-MCNC: 4.3 MMOL/L — SIGNIFICANT CHANGE UP (ref 3.5–5.3)
POTASSIUM SERPL-MCNC: 4.4 MMOL/L — SIGNIFICANT CHANGE UP (ref 3.5–5.3)
POTASSIUM SERPL-MCNC: 4.5 MMOL/L — SIGNIFICANT CHANGE UP (ref 3.5–5.3)
POTASSIUM SERPL-SCNC: 3.8 MMOL/L — SIGNIFICANT CHANGE UP (ref 3.5–5.3)
POTASSIUM SERPL-SCNC: 4.1 MMOL/L — SIGNIFICANT CHANGE UP (ref 3.5–5.3)
POTASSIUM SERPL-SCNC: 4.2 MMOL/L — SIGNIFICANT CHANGE UP (ref 3.5–5.3)
POTASSIUM SERPL-SCNC: 4.2 MMOL/L — SIGNIFICANT CHANGE UP (ref 3.5–5.3)
POTASSIUM SERPL-SCNC: 4.3 MMOL/L — SIGNIFICANT CHANGE UP (ref 3.5–5.3)
POTASSIUM SERPL-SCNC: 4.4 MMOL/L — SIGNIFICANT CHANGE UP (ref 3.5–5.3)
POTASSIUM SERPL-SCNC: 4.5 MMOL/L — SIGNIFICANT CHANGE UP (ref 3.5–5.3)
PROT FLD-MCNC: 4.6 G/DL — SIGNIFICANT CHANGE UP
PROT SERPL-MCNC: 6 G/DL — SIGNIFICANT CHANGE UP (ref 6–8.3)
PROT SERPL-MCNC: 6.1 G/DL — SIGNIFICANT CHANGE UP (ref 6–8.3)
PROT SERPL-MCNC: 6.6 G/DL — SIGNIFICANT CHANGE UP (ref 6–8.3)
PROT SERPL-MCNC: 6.6 G/DL — SIGNIFICANT CHANGE UP (ref 6–8.3)
PROTHROM AB SERPL-ACNC: 13.8 SEC — HIGH (ref 10.5–13.4)
PROTHROM AB SERPL-ACNC: 14 SEC — HIGH (ref 10.5–13.4)
RBC # BLD: 4.35 M/UL — SIGNIFICANT CHANGE UP (ref 4.2–5.8)
RBC # BLD: 4.42 M/UL — SIGNIFICANT CHANGE UP (ref 4.2–5.8)
RBC # BLD: 4.45 M/UL — SIGNIFICANT CHANGE UP (ref 4.2–5.8)
RBC # BLD: 4.47 M/UL — SIGNIFICANT CHANGE UP (ref 4.2–5.8)
RBC # BLD: 4.59 M/UL — SIGNIFICANT CHANGE UP (ref 4.2–5.8)
RBC # BLD: 4.62 M/UL — SIGNIFICANT CHANGE UP (ref 4.2–5.8)
RBC # BLD: 4.64 M/UL — SIGNIFICANT CHANGE UP (ref 4.2–5.8)
RBC # BLD: 4.65 M/UL — SIGNIFICANT CHANGE UP (ref 4.2–5.8)
RBC # BLD: 4.7 M/UL — SIGNIFICANT CHANGE UP (ref 4.2–5.8)
RBC # BLD: 4.78 M/UL — SIGNIFICANT CHANGE UP (ref 4.2–5.8)
RBC # FLD: 13.1 % — SIGNIFICANT CHANGE UP (ref 10.3–14.5)
RBC # FLD: 13.2 % — SIGNIFICANT CHANGE UP (ref 10.3–14.5)
RBC # FLD: 13.2 % — SIGNIFICANT CHANGE UP (ref 10.3–14.5)
RBC # FLD: 13.3 % — SIGNIFICANT CHANGE UP (ref 10.3–14.5)
RBC # FLD: 13.3 % — SIGNIFICANT CHANGE UP (ref 10.3–14.5)
RBC # FLD: 13.4 % — SIGNIFICANT CHANGE UP (ref 10.3–14.5)
RBC # FLD: 13.6 % — SIGNIFICANT CHANGE UP (ref 10.3–14.5)
RBC # FLD: 13.8 % — SIGNIFICANT CHANGE UP (ref 10.3–14.5)
RCV VOL RI: 2000 CELLS/UL — HIGH (ref 0–5)
RH IG SCN BLD-IMP: POSITIVE — SIGNIFICANT CHANGE UP
RH IG SCN BLD-IMP: POSITIVE — SIGNIFICANT CHANGE UP
RSV RNA NPH QL NAA+NON-PROBE: SIGNIFICANT CHANGE UP
S AUREUS DNA NOSE QL NAA+PROBE: SIGNIFICANT CHANGE UP
SARS-COV-2 RNA SPEC QL NAA+PROBE: SIGNIFICANT CHANGE UP
SODIUM SERPL-SCNC: 132 MMOL/L — LOW (ref 135–145)
SODIUM SERPL-SCNC: 133 MMOL/L — LOW (ref 135–145)
SODIUM SERPL-SCNC: 134 MMOL/L — LOW (ref 135–145)
SODIUM SERPL-SCNC: 136 MMOL/L — SIGNIFICANT CHANGE UP (ref 135–145)
SODIUM SERPL-SCNC: 137 MMOL/L — SIGNIFICANT CHANGE UP (ref 135–145)
SODIUM SERPL-SCNC: 137 MMOL/L — SIGNIFICANT CHANGE UP (ref 135–145)
SODIUM SERPL-SCNC: 138 MMOL/L — SIGNIFICANT CHANGE UP (ref 135–145)
SODIUM SERPL-SCNC: 138 MMOL/L — SIGNIFICANT CHANGE UP (ref 135–145)
SODIUM SERPL-SCNC: 139 MMOL/L — SIGNIFICANT CHANGE UP (ref 135–145)
SPECIMEN SOURCE: SIGNIFICANT CHANGE UP
TOTAL NUCLEATED CELL COUNT, BODY FLUID: 3469 CELLS/UL — HIGH (ref 0–5)
TROPONIN T, HIGH SENSITIVITY RESULT: 28 NG/L — SIGNIFICANT CHANGE UP
TSH SERPL-MCNC: 2.67 UIU/ML — SIGNIFICANT CHANGE UP (ref 0.27–4.2)
TUBE TYPE: SIGNIFICANT CHANGE UP
VANCOMYCIN TROUGH SERPL-MCNC: 15 UG/ML — SIGNIFICANT CHANGE UP (ref 10–20)
VIT B12 SERPL-MCNC: 883 PG/ML — SIGNIFICANT CHANGE UP (ref 200–900)
WBC # BLD: 11.34 K/UL — HIGH (ref 3.8–10.5)
WBC # BLD: 7.14 K/UL — SIGNIFICANT CHANGE UP (ref 3.8–10.5)
WBC # BLD: 7.25 K/UL — SIGNIFICANT CHANGE UP (ref 3.8–10.5)
WBC # BLD: 7.47 K/UL — SIGNIFICANT CHANGE UP (ref 3.8–10.5)
WBC # BLD: 7.76 K/UL — SIGNIFICANT CHANGE UP (ref 3.8–10.5)
WBC # BLD: 7.86 K/UL — SIGNIFICANT CHANGE UP (ref 3.8–10.5)
WBC # BLD: 7.98 K/UL — SIGNIFICANT CHANGE UP (ref 3.8–10.5)
WBC # BLD: 8.03 K/UL — SIGNIFICANT CHANGE UP (ref 3.8–10.5)
WBC # BLD: 8.16 K/UL — SIGNIFICANT CHANGE UP (ref 3.8–10.5)
WBC # BLD: 8.84 K/UL — SIGNIFICANT CHANGE UP (ref 3.8–10.5)
WBC # FLD AUTO: 11.34 K/UL — HIGH (ref 3.8–10.5)
WBC # FLD AUTO: 7.14 K/UL — SIGNIFICANT CHANGE UP (ref 3.8–10.5)
WBC # FLD AUTO: 7.25 K/UL — SIGNIFICANT CHANGE UP (ref 3.8–10.5)
WBC # FLD AUTO: 7.47 K/UL — SIGNIFICANT CHANGE UP (ref 3.8–10.5)
WBC # FLD AUTO: 7.76 K/UL — SIGNIFICANT CHANGE UP (ref 3.8–10.5)
WBC # FLD AUTO: 7.86 K/UL — SIGNIFICANT CHANGE UP (ref 3.8–10.5)
WBC # FLD AUTO: 7.98 K/UL — SIGNIFICANT CHANGE UP (ref 3.8–10.5)
WBC # FLD AUTO: 8.03 K/UL — SIGNIFICANT CHANGE UP (ref 3.8–10.5)
WBC # FLD AUTO: 8.16 K/UL — SIGNIFICANT CHANGE UP (ref 3.8–10.5)
WBC # FLD AUTO: 8.84 K/UL — SIGNIFICANT CHANGE UP (ref 3.8–10.5)

## 2022-01-01 PROCEDURE — 71275 CT ANGIOGRAPHY CHEST: CPT | Mod: 26,MA

## 2022-01-01 PROCEDURE — 99233 SBSQ HOSP IP/OBS HIGH 50: CPT | Mod: GC

## 2022-01-01 PROCEDURE — 32555 ASPIRATE PLEURA W/ IMAGING: CPT

## 2022-01-01 PROCEDURE — 71045 X-RAY EXAM CHEST 1 VIEW: CPT | Mod: 26

## 2022-01-01 PROCEDURE — 99232 SBSQ HOSP IP/OBS MODERATE 35: CPT | Mod: GC

## 2022-01-01 PROCEDURE — 93306 TTE W/DOPPLER COMPLETE: CPT | Mod: 26

## 2022-01-01 PROCEDURE — 93010 ELECTROCARDIOGRAM REPORT: CPT

## 2022-01-01 PROCEDURE — 74177 CT ABD & PELVIS W/CONTRAST: CPT | Mod: 26

## 2022-01-01 PROCEDURE — 71046 X-RAY EXAM CHEST 2 VIEWS: CPT | Mod: 26

## 2022-01-01 PROCEDURE — L9991: CPT

## 2022-01-01 PROCEDURE — 88342 IMHCHEM/IMCYTCHM 1ST ANTB: CPT | Mod: 26,59

## 2022-01-01 PROCEDURE — 99285 EMERGENCY DEPT VISIT HI MDM: CPT

## 2022-01-01 PROCEDURE — 88305 TISSUE EXAM BY PATHOLOGIST: CPT | Mod: 26

## 2022-01-01 PROCEDURE — 71250 CT THORAX DX C-: CPT | Mod: 26

## 2022-01-01 PROCEDURE — 88112 CYTOPATH CELL ENHANCE TECH: CPT | Mod: 26

## 2022-01-01 PROCEDURE — 76604 US EXAM CHEST: CPT | Mod: 26

## 2022-01-01 PROCEDURE — 99497 ADVNCD CARE PLAN 30 MIN: CPT

## 2022-01-01 PROCEDURE — 99213 OFFICE O/P EST LOW 20 MIN: CPT | Mod: GC

## 2022-01-01 PROCEDURE — 99223 1ST HOSP IP/OBS HIGH 75: CPT

## 2022-01-01 PROCEDURE — 88360 TUMOR IMMUNOHISTOCHEM/MANUAL: CPT | Mod: 26

## 2022-01-01 PROCEDURE — 88341 IMHCHEM/IMCYTCHM EA ADD ANTB: CPT | Mod: 26,59

## 2022-01-01 PROCEDURE — 70450 CT HEAD/BRAIN W/O DYE: CPT | Mod: 26

## 2022-01-01 PROCEDURE — 76770 US EXAM ABDO BACK WALL COMP: CPT | Mod: 26

## 2022-01-01 PROCEDURE — 99205 OFFICE O/P NEW HI 60 MIN: CPT

## 2022-01-01 PROCEDURE — 99223 1ST HOSP IP/OBS HIGH 75: CPT | Mod: 25,GC

## 2022-01-01 RX ORDER — VANCOMYCIN HCL 1 G
1000 VIAL (EA) INTRAVENOUS ONCE
Refills: 0 | Status: COMPLETED | OUTPATIENT
Start: 2022-01-01 | End: 2022-01-01

## 2022-01-01 RX ORDER — PIPERACILLIN AND TAZOBACTAM 4; .5 G/20ML; G/20ML
3.38 INJECTION, POWDER, LYOPHILIZED, FOR SOLUTION INTRAVENOUS ONCE
Refills: 0 | Status: COMPLETED | OUTPATIENT
Start: 2022-01-01 | End: 2022-01-01

## 2022-01-01 RX ORDER — DEXTROSE 50 % IN WATER 50 %
25 SYRINGE (ML) INTRAVENOUS ONCE
Refills: 0 | Status: DISCONTINUED | OUTPATIENT
Start: 2022-01-01 | End: 2022-01-01

## 2022-01-01 RX ORDER — SENNA PLUS 8.6 MG/1
2 TABLET ORAL AT BEDTIME
Refills: 0 | Status: DISCONTINUED | OUTPATIENT
Start: 2022-01-01 | End: 2022-01-01

## 2022-01-01 RX ORDER — HYDRALAZINE HCL 50 MG
10 TABLET ORAL ONCE
Refills: 0 | Status: COMPLETED | OUTPATIENT
Start: 2022-01-01 | End: 2022-01-01

## 2022-01-01 RX ORDER — INSULIN LISPRO 100/ML
VIAL (ML) SUBCUTANEOUS
Refills: 0 | Status: DISCONTINUED | OUTPATIENT
Start: 2022-01-01 | End: 2022-01-01

## 2022-01-01 RX ORDER — VANCOMYCIN HCL 1 G
1000 VIAL (EA) INTRAVENOUS EVERY 12 HOURS
Refills: 0 | Status: DISCONTINUED | OUTPATIENT
Start: 2022-01-01 | End: 2022-01-01

## 2022-01-01 RX ORDER — SODIUM CHLORIDE 9 MG/ML
1000 INJECTION, SOLUTION INTRAVENOUS
Refills: 0 | Status: DISCONTINUED | OUTPATIENT
Start: 2022-01-01 | End: 2022-01-01

## 2022-01-01 RX ORDER — GLIMEPIRIDE 1 MG
1 TABLET ORAL
Qty: 0 | Refills: 0 | DISCHARGE

## 2022-01-01 RX ORDER — POLYETHYLENE GLYCOL 3350 17 G/17G
17 POWDER, FOR SOLUTION ORAL DAILY
Refills: 0 | Status: DISCONTINUED | OUTPATIENT
Start: 2022-01-01 | End: 2022-01-01

## 2022-01-01 RX ORDER — INSULIN LISPRO 100/ML
VIAL (ML) SUBCUTANEOUS AT BEDTIME
Refills: 0 | Status: DISCONTINUED | OUTPATIENT
Start: 2022-01-01 | End: 2022-01-01

## 2022-01-01 RX ORDER — PIPERACILLIN AND TAZOBACTAM 4; .5 G/20ML; G/20ML
3.38 INJECTION, POWDER, LYOPHILIZED, FOR SOLUTION INTRAVENOUS EVERY 8 HOURS
Refills: 0 | Status: DISCONTINUED | OUTPATIENT
Start: 2022-01-01 | End: 2022-01-01

## 2022-01-01 RX ORDER — INFLUENZA VIRUS VACCINE 15; 15; 15; 15 UG/.5ML; UG/.5ML; UG/.5ML; UG/.5ML
0.7 SUSPENSION INTRAMUSCULAR ONCE
Refills: 0 | Status: DISCONTINUED | OUTPATIENT
Start: 2022-01-01 | End: 2022-01-01

## 2022-01-01 RX ORDER — INSULIN GLARGINE 100 [IU]/ML
3 INJECTION, SOLUTION SUBCUTANEOUS AT BEDTIME
Refills: 0 | Status: DISCONTINUED | OUTPATIENT
Start: 2022-01-01 | End: 2022-01-01

## 2022-01-01 RX ORDER — ACETAMINOPHEN 500 MG
1000 TABLET ORAL ONCE
Refills: 0 | Status: COMPLETED | OUTPATIENT
Start: 2022-01-01 | End: 2022-01-01

## 2022-01-01 RX ORDER — SODIUM,POTASSIUM PHOSPHATES 278-250MG
2 POWDER IN PACKET (EA) ORAL ONCE
Refills: 0 | Status: COMPLETED | OUTPATIENT
Start: 2022-01-01 | End: 2022-01-01

## 2022-01-01 RX ORDER — GLUCAGON INJECTION, SOLUTION 0.5 MG/.1ML
1 INJECTION, SOLUTION SUBCUTANEOUS ONCE
Refills: 0 | Status: DISCONTINUED | OUTPATIENT
Start: 2022-01-01 | End: 2022-01-01

## 2022-01-01 RX ORDER — AMLODIPINE BESYLATE 2.5 MG/1
1 TABLET ORAL
Qty: 0 | Refills: 0 | DISCHARGE
Start: 2022-01-01

## 2022-01-01 RX ORDER — AMLODIPINE BESYLATE 2.5 MG/1
10 TABLET ORAL DAILY
Refills: 0 | Status: DISCONTINUED | OUTPATIENT
Start: 2022-01-01 | End: 2022-01-01

## 2022-01-01 RX ORDER — LANOLIN ALCOHOL/MO/W.PET/CERES
3 CREAM (GRAM) TOPICAL AT BEDTIME
Refills: 0 | Status: DISCONTINUED | OUTPATIENT
Start: 2022-01-01 | End: 2022-01-01

## 2022-01-01 RX ORDER — ACETAMINOPHEN 500 MG
650 TABLET ORAL EVERY 6 HOURS
Refills: 0 | Status: DISCONTINUED | OUTPATIENT
Start: 2022-01-01 | End: 2022-01-01

## 2022-01-01 RX ORDER — AMLODIPINE BESYLATE 2.5 MG/1
5 TABLET ORAL DAILY
Refills: 0 | Status: DISCONTINUED | OUTPATIENT
Start: 2022-01-01 | End: 2022-01-01

## 2022-01-01 RX ORDER — SODIUM,POTASSIUM PHOSPHATES 278-250MG
1 POWDER IN PACKET (EA) ORAL
Refills: 0 | Status: DISCONTINUED | OUTPATIENT
Start: 2022-01-01 | End: 2022-01-01

## 2022-01-01 RX ORDER — ENOXAPARIN SODIUM 100 MG/ML
40 INJECTION SUBCUTANEOUS EVERY 24 HOURS
Refills: 0 | Status: DISCONTINUED | OUTPATIENT
Start: 2022-01-01 | End: 2022-01-01

## 2022-01-01 RX ORDER — DEXTROSE 50 % IN WATER 50 %
12.5 SYRINGE (ML) INTRAVENOUS ONCE
Refills: 0 | Status: DISCONTINUED | OUTPATIENT
Start: 2022-01-01 | End: 2022-01-01

## 2022-01-01 RX ORDER — DEXTROSE 50 % IN WATER 50 %
15 SYRINGE (ML) INTRAVENOUS ONCE
Refills: 0 | Status: DISCONTINUED | OUTPATIENT
Start: 2022-01-01 | End: 2022-01-01

## 2022-01-01 RX ORDER — ATORVASTATIN CALCIUM 80 MG/1
20 TABLET, FILM COATED ORAL AT BEDTIME
Refills: 0 | Status: DISCONTINUED | OUTPATIENT
Start: 2022-01-01 | End: 2022-01-01

## 2022-01-01 RX ORDER — METFORMIN HYDROCHLORIDE 850 MG/1
1 TABLET ORAL
Qty: 0 | Refills: 0 | DISCHARGE

## 2022-01-01 RX ORDER — LISINOPRIL 2.5 MG/1
20 TABLET ORAL DAILY
Refills: 0 | Status: DISCONTINUED | OUTPATIENT
Start: 2022-01-01 | End: 2022-01-01

## 2022-01-01 RX ORDER — SODIUM,POTASSIUM PHOSPHATES 278-250MG
1 POWDER IN PACKET (EA) ORAL ONCE
Refills: 0 | Status: COMPLETED | OUTPATIENT
Start: 2022-01-01 | End: 2022-01-01

## 2022-01-01 RX ORDER — INSULIN LISPRO 100/ML
2 VIAL (ML) SUBCUTANEOUS
Refills: 0 | Status: DISCONTINUED | OUTPATIENT
Start: 2022-01-01 | End: 2022-01-01

## 2022-01-01 RX ORDER — METFORMIN HYDROCHLORIDE 850 MG/1
1 TABLET ORAL
Qty: 60 | Refills: 0
Start: 2022-01-01 | End: 2023-01-01

## 2022-01-01 RX ADMIN — PIPERACILLIN AND TAZOBACTAM 200 GRAM(S): 4; .5 INJECTION, POWDER, LYOPHILIZED, FOR SOLUTION INTRAVENOUS at 01:10

## 2022-01-01 RX ADMIN — SENNA PLUS 2 TABLET(S): 8.6 TABLET ORAL at 22:11

## 2022-01-01 RX ADMIN — ATORVASTATIN CALCIUM 20 MILLIGRAM(S): 80 TABLET, FILM COATED ORAL at 21:48

## 2022-01-01 RX ADMIN — ENOXAPARIN SODIUM 40 MILLIGRAM(S): 100 INJECTION SUBCUTANEOUS at 15:58

## 2022-01-01 RX ADMIN — INSULIN GLARGINE 3 UNIT(S): 100 INJECTION, SOLUTION SUBCUTANEOUS at 22:29

## 2022-01-01 RX ADMIN — AMLODIPINE BESYLATE 5 MILLIGRAM(S): 2.5 TABLET ORAL at 06:26

## 2022-01-01 RX ADMIN — Medication 1: at 09:37

## 2022-01-01 RX ADMIN — INSULIN GLARGINE 3 UNIT(S): 100 INJECTION, SOLUTION SUBCUTANEOUS at 22:40

## 2022-01-01 RX ADMIN — ENOXAPARIN SODIUM 40 MILLIGRAM(S): 100 INJECTION SUBCUTANEOUS at 17:48

## 2022-01-01 RX ADMIN — Medication 2 UNIT(S): at 13:05

## 2022-01-01 RX ADMIN — Medication 3: at 18:43

## 2022-01-01 RX ADMIN — Medication 4: at 18:17

## 2022-01-01 RX ADMIN — AMLODIPINE BESYLATE 5 MILLIGRAM(S): 2.5 TABLET ORAL at 06:49

## 2022-01-01 RX ADMIN — Medication 10 MILLIGRAM(S): at 17:49

## 2022-01-01 RX ADMIN — Medication 250 MILLIGRAM(S): at 04:37

## 2022-01-01 RX ADMIN — Medication 250 MILLIGRAM(S): at 12:01

## 2022-01-01 RX ADMIN — LISINOPRIL 20 MILLIGRAM(S): 2.5 TABLET ORAL at 18:41

## 2022-01-01 RX ADMIN — Medication 2 UNIT(S): at 18:40

## 2022-01-01 RX ADMIN — ATORVASTATIN CALCIUM 20 MILLIGRAM(S): 80 TABLET, FILM COATED ORAL at 22:40

## 2022-01-01 RX ADMIN — Medication 2 UNIT(S): at 12:59

## 2022-01-01 RX ADMIN — Medication 5: at 12:58

## 2022-01-01 RX ADMIN — Medication 2: at 22:39

## 2022-01-01 RX ADMIN — Medication 1: at 22:29

## 2022-01-01 RX ADMIN — Medication 1: at 09:34

## 2022-01-01 RX ADMIN — Medication 3: at 18:33

## 2022-01-01 RX ADMIN — Medication 3 MILLIGRAM(S): at 22:28

## 2022-01-01 RX ADMIN — PIPERACILLIN AND TAZOBACTAM 25 GRAM(S): 4; .5 INJECTION, POWDER, LYOPHILIZED, FOR SOLUTION INTRAVENOUS at 10:35

## 2022-01-01 RX ADMIN — Medication 1: at 09:08

## 2022-01-01 RX ADMIN — ATORVASTATIN CALCIUM 20 MILLIGRAM(S): 80 TABLET, FILM COATED ORAL at 22:36

## 2022-01-01 RX ADMIN — ATORVASTATIN CALCIUM 20 MILLIGRAM(S): 80 TABLET, FILM COATED ORAL at 22:28

## 2022-01-01 RX ADMIN — ATORVASTATIN CALCIUM 20 MILLIGRAM(S): 80 TABLET, FILM COATED ORAL at 22:18

## 2022-01-01 RX ADMIN — Medication 3: at 18:19

## 2022-01-01 RX ADMIN — Medication 400 MILLIGRAM(S): at 17:49

## 2022-01-01 RX ADMIN — Medication 1 TABLET(S): at 17:48

## 2022-01-01 RX ADMIN — Medication 3: at 13:19

## 2022-01-01 RX ADMIN — ENOXAPARIN SODIUM 40 MILLIGRAM(S): 100 INJECTION SUBCUTANEOUS at 16:07

## 2022-01-01 RX ADMIN — Medication 5: at 09:16

## 2022-01-01 RX ADMIN — ENOXAPARIN SODIUM 40 MILLIGRAM(S): 100 INJECTION SUBCUTANEOUS at 15:30

## 2022-01-01 RX ADMIN — Medication 4: at 18:24

## 2022-01-01 RX ADMIN — ENOXAPARIN SODIUM 40 MILLIGRAM(S): 100 INJECTION SUBCUTANEOUS at 18:40

## 2022-01-01 RX ADMIN — Medication 3 MILLIGRAM(S): at 22:11

## 2022-01-01 RX ADMIN — Medication 2: at 13:05

## 2022-01-01 RX ADMIN — Medication 2 UNIT(S): at 09:15

## 2022-01-01 RX ADMIN — Medication 3: at 18:29

## 2022-01-01 RX ADMIN — Medication 1 PACKET(S): at 12:01

## 2022-01-01 RX ADMIN — AMLODIPINE BESYLATE 5 MILLIGRAM(S): 2.5 TABLET ORAL at 17:47

## 2022-01-01 RX ADMIN — Medication 3 MILLIGRAM(S): at 22:40

## 2022-01-01 RX ADMIN — AMLODIPINE BESYLATE 5 MILLIGRAM(S): 2.5 TABLET ORAL at 05:57

## 2022-01-01 RX ADMIN — Medication 2: at 10:11

## 2022-01-01 RX ADMIN — Medication 2 TABLET(S): at 07:53

## 2022-01-01 RX ADMIN — SENNA PLUS 2 TABLET(S): 8.6 TABLET ORAL at 22:40

## 2022-01-01 RX ADMIN — SENNA PLUS 2 TABLET(S): 8.6 TABLET ORAL at 22:37

## 2022-01-01 RX ADMIN — Medication 2 UNIT(S): at 09:38

## 2022-01-01 RX ADMIN — PIPERACILLIN AND TAZOBACTAM 25 GRAM(S): 4; .5 INJECTION, POWDER, LYOPHILIZED, FOR SOLUTION INTRAVENOUS at 13:55

## 2022-01-01 RX ADMIN — INSULIN GLARGINE 3 UNIT(S): 100 INJECTION, SOLUTION SUBCUTANEOUS at 22:20

## 2022-01-01 RX ADMIN — PIPERACILLIN AND TAZOBACTAM 25 GRAM(S): 4; .5 INJECTION, POWDER, LYOPHILIZED, FOR SOLUTION INTRAVENOUS at 05:59

## 2022-01-01 RX ADMIN — Medication 250 MILLIGRAM(S): at 09:18

## 2022-01-01 RX ADMIN — Medication 3 MILLIGRAM(S): at 22:37

## 2022-01-01 RX ADMIN — AMLODIPINE BESYLATE 5 MILLIGRAM(S): 2.5 TABLET ORAL at 06:28

## 2022-01-01 RX ADMIN — Medication 1: at 18:39

## 2022-01-01 RX ADMIN — Medication 1: at 22:19

## 2022-01-01 RX ADMIN — ENOXAPARIN SODIUM 40 MILLIGRAM(S): 100 INJECTION SUBCUTANEOUS at 16:43

## 2022-01-01 RX ADMIN — Medication 63.75 MILLIMOLE(S): at 15:58

## 2022-01-01 RX ADMIN — Medication 2 TABLET(S): at 15:30

## 2022-01-01 RX ADMIN — ATORVASTATIN CALCIUM 20 MILLIGRAM(S): 80 TABLET, FILM COATED ORAL at 22:01

## 2022-01-01 RX ADMIN — ENOXAPARIN SODIUM 40 MILLIGRAM(S): 100 INJECTION SUBCUTANEOUS at 17:50

## 2022-01-01 RX ADMIN — PIPERACILLIN AND TAZOBACTAM 25 GRAM(S): 4; .5 INJECTION, POWDER, LYOPHILIZED, FOR SOLUTION INTRAVENOUS at 17:48

## 2022-01-01 RX ADMIN — AMLODIPINE BESYLATE 5 MILLIGRAM(S): 2.5 TABLET ORAL at 06:52

## 2022-01-01 RX ADMIN — ATORVASTATIN CALCIUM 20 MILLIGRAM(S): 80 TABLET, FILM COATED ORAL at 23:43

## 2022-01-01 RX ADMIN — Medication 3: at 12:52

## 2022-01-01 RX ADMIN — Medication 1: at 13:11

## 2022-01-01 RX ADMIN — Medication 3: at 13:56

## 2022-01-01 RX ADMIN — Medication 1000 MILLIGRAM(S): at 18:14

## 2022-01-01 RX ADMIN — AMLODIPINE BESYLATE 10 MILLIGRAM(S): 2.5 TABLET ORAL at 05:46

## 2022-01-01 RX ADMIN — AMLODIPINE BESYLATE 5 MILLIGRAM(S): 2.5 TABLET ORAL at 06:11

## 2022-01-01 RX ADMIN — Medication 1 PACKET(S): at 18:41

## 2022-01-01 RX ADMIN — ATORVASTATIN CALCIUM 20 MILLIGRAM(S): 80 TABLET, FILM COATED ORAL at 22:11

## 2022-01-01 RX ADMIN — INSULIN GLARGINE 3 UNIT(S): 100 INJECTION, SOLUTION SUBCUTANEOUS at 22:37

## 2022-01-01 RX ADMIN — Medication 3: at 11:03

## 2022-01-01 RX ADMIN — Medication 2 UNIT(S): at 18:20

## 2022-12-02 NOTE — ED ADULT TRIAGE NOTE - CHIEF COMPLAINT QUOTE
Pt c/o SOB and generalized weakness for 5-6x days. Per daughter, heard some wheezing this morning. Pt also endorses cough xfew days and chills. No audible wheezing at triage. Pt denies chest pain, headache dizziness, nausea, vomiting. PMH DM2 , HTN, HLD     spo2 94% on room air, placed on 2L NC w/ improvement to 98%

## 2022-12-02 NOTE — ED ADULT TRIAGE NOTE - EXPLANATION OF PATIENT'S REASON FOR LEAVING
pt was in the room awaiting provider eval.  Told providers that he was going to leave if they did not see him right away, pt walked out without seeing provider.

## 2022-12-07 NOTE — ED PROVIDER NOTE - OBJECTIVE STATEMENT
82-year-old male history of diabetes on metformin, hypertension, dengue fever in 2019 presents for worsening shortness of breath at rest, fatigue/chills, and difficulty walking s/p fall 2 weeks ago with no head trauma, LOC, or prolonged downtime.  Patient fell from stepping stool hitting the left side of his back and coughed up small amount of blood followed by yellow sputum, but did not inform daughter that he is living with.  He began to experience difficulty breathing over the next week, decreased p.o. intake, and constipation.  Son brought him to the ED 1 week ago but left prior to receiving results.  Today he presents with other son due to worsening shortness of breath at rest after he revealed that he fell 2 weeks ago.  He has a recent history of second fall 1.5 months ago due to dizziness which was evaluated by neurologist using MRI with no remarkable findings.  His PCP is Dr. Sam Kirby.

## 2022-12-07 NOTE — ED PROVIDER NOTE - NS ED ROS FT
GENERAL: No fever or chills  EYES: No change in vision  HEENT: No trouble swallowing or speaking  CARDIAC: No chest pain contrary to triage note  PULMONARY: as per HPI  GI: constipation, no abdominal pain, no nausea or no vomiting, no diarrhea  : No changes in urination  SKIN: No rashes  NEURO: No headache, no numbness  MSK: No joint pain  Otherwise as HPI or negative.

## 2022-12-07 NOTE — H&P ADULT - NSICDXFAMILYHX_GEN_ALL_CORE_FT
FAMILY HISTORY:  Sibling  Still living? Unknown  FH: HTN (hypertension), Age at diagnosis: Age Unknown  FH: type 2 diabetes, Age at diagnosis: Age Unknown

## 2022-12-07 NOTE — H&P ADULT - PROBLEM/PLAN-2
Progress Notes by Rosalia Nichole at 11/29/17 12:46 PM     Author:  Rosalia Nichole Service:  (none) Author Type:  Patient      Filed:  11/29/17 12:47 PM Encounter Date:  11/29/2017 Status:  Signed     :  Rosalia Nichole (Patient )            Per Mikki, Patient insurance information does not match. Please call to verify insurance information from patient.[[email or web address]]       Revision History        User Key Date/Time User Provider Type Action    > CV1.1 11/29/17 12:47 PM Rosalia Nichole Patient  Sign    M - Manual             DISPLAY PLAN FREE TEXT

## 2022-12-07 NOTE — H&P ADULT - PROBLEM SELECTOR PLAN 3
- patient on metformin and glimeperide at home  - low dose ISS while in-patient - patient follows with outpatient neurologist for gait issues  - check b12 in AM (given metformin use)  - PT consult

## 2022-12-07 NOTE — ED PROVIDER NOTE - CLINICAL SUMMARY MEDICAL DECISION MAKING FREE TEXT BOX
82-year-old male history of diabetes on metformin, hypertension, dengue fever in 2019 presents for worsening shortness of breath at rest, fatigue/chills, and difficulty walking s/p fall 2 weeks ago with no head trauma, LOC, or prolonged downtime.  Physical exam is remarkable for increased work of breathing with belly breathing, satting at 97-98% on 3LPM NC (drops to 92% on RA), diffuse expiratory wheezing worse with any movement. Concern for pulmonary contusion vs pneumonia (possibly due to rib fracture) vs PE vs pneumothorax vs new onset CHF. Plan for basics, BNP, trops, EKG, CXR, CTA PE protocol, and 3 LPM NC. Dispo likely admission for SOB and new onset hypoxia requiring NC.

## 2022-12-07 NOTE — H&P ADULT - PROBLEM SELECTOR PLAN 4
- hold home lisinopril until after thoracentesis as patient may become hypotensive due to fluid shifts - patient on metformin and glimeperide at home  - low dose ISS while in-patient

## 2022-12-07 NOTE — H&P ADULT - NSHPPHYSICALEXAM_GEN_ALL_CORE
Vital Signs Last 24 Hrs  T(C): 37.2 (07 Dec 2022 21:07), Max: 37.2 (07 Dec 2022 21:07)  T(F): 98.9 (07 Dec 2022 21:07), Max: 98.9 (07 Dec 2022 21:07)  HR: 91 (07 Dec 2022 21:07) (88 - 96)  BP: 154/83 (07 Dec 2022 21:07) (154/79 - 176/72)  BP(mean): --  RR: 30 (07 Dec 2022 21:07) (18 - 30)  SpO2: 99% (07 Dec 2022 21:07) (90% - 99%)    Parameters below as of 07 Dec 2022 21:07  Patient On (Oxygen Delivery Method): nasal cannula  O2 Flow (L/min): 4    CAPILLARY BLOOD GLUCOSE      POCT Blood Glucose.: 96 mg/dL (07 Dec 2022 18:08)  POCT Blood Glucose.: 216 mg/dL (07 Dec 2022 13:16)    I&O's Summary      PHYSICAL EXAM:  GENERAL: NAD, well-developed  HEAD:  Atraumatic, Normocephalic  EYES: EOMI, PERRLA, conjunctiva and sclera clear  NECK: Supple, No JVD  CHEST/LUNG: Clear to auscultation bilaterally; No wheeze  HEART: Regular rate and rhythm; No murmurs, rubs, or gallops  ABDOMEN: Soft, Nontender, Nondistended; Bowel sounds present  EXTREMITIES:  2+ Peripheral Pulses, No clubbing, cyanosis, or edema  PSYCH: AAOx3  NEUROLOGY: non-focal  SKIN: No rashes or lesions Vital Signs Last 24 Hrs  T(C): 37.2 (07 Dec 2022 21:07), Max: 37.2 (07 Dec 2022 21:07)  T(F): 98.9 (07 Dec 2022 21:07), Max: 98.9 (07 Dec 2022 21:07)  HR: 91 (07 Dec 2022 21:07) (88 - 96)  BP: 154/83 (07 Dec 2022 21:07) (154/79 - 176/72)  BP(mean): --  RR: 30 (07 Dec 2022 21:07) (18 - 30)  SpO2: 99% (07 Dec 2022 21:07) (90% - 99%)    Parameters below as of 07 Dec 2022 21:07  Patient On (Oxygen Delivery Method): nasal cannula  O2 Flow (L/min): 4    CAPILLARY BLOOD GLUCOSE      POCT Blood Glucose.: 96 mg/dL (07 Dec 2022 18:08)  POCT Blood Glucose.: 216 mg/dL (07 Dec 2022 13:16)    I&O's Summary      PHYSICAL EXAM:  GENERAL: elderly male, laying comfortably in bed  HEAD:  Atraumatic, Normocephalic  EYES: EOMI, PERRLA, anicteric sclera  NECK: Supple, No JVD  CHEST/LUNG: decreased lung sounds throughout left lung, right side clear to auscultation. tachypneic, using abdominal muscles with breaths, able to speak in full sentences   HEART: Normal rate and regular rhythm; No murmurs, rubs, or gallops  ABDOMEN: Soft, Nontender, Nondistended; Bowel sounds present  EXTREMITIES:  2+ Peripheral Pulses, No clubbing, cyanosis, or edema  PSYCH: AAOx3  NEUROLOGY: non-focal  SKIN: No rashes or lesions

## 2022-12-07 NOTE — H&P ADULT - PROBLEM SELECTOR PLAN 7
DVT ppx: hold chemical dvt ppx until after thoracentesis, SCDs for now  Diet: DASH halal  Dispo: admit to medicine for pleural effusion

## 2022-12-07 NOTE — H&P ADULT - PROBLEM SELECTOR PLAN 6
DVT ppx: hold chemical dvt ppx until after thoracentesis, SCDs for now  Diet: DASH halal  Dispo: admit to medicine for pleural effusion - continue home atorvastatin

## 2022-12-07 NOTE — ED ADULT NURSE REASSESSMENT NOTE - NS ED NURSE REASSESS COMMENT FT1
BREAK RN: pt. remains A&Ox4, awake and resting. pt. offers no new complaints at this time. no acute distress noted. respirations even and unlabored. NSR on cardiac monitor. VS as noted by PCA.

## 2022-12-07 NOTE — ED PROVIDER NOTE - ATTENDING CONTRIBUTION TO CARE
DR. FLOWERS, ATTENDING MD-  I performed a face to face bedside interview with the patient regarding history of present illness, review of symptoms and past medical history. I completed an independent physical exam.  I have discussed the patient's plan of care with the resident.   Documentation as above in the note.    82-year-old male history of type 2 diabetes hypertension with here with complaint of shortness of breath and left-sided rib pain.  He had a mechanical fall off of a stool while adjusting the TV 2 weeks ago and landed on the floor on his left side.  Since that time he has developed gradual onset shortness of breath over the last few days.  He is not typically on supplemental oxygen.  Patient hypoxic here to 90% on room air.  Mildly tachypneic.  Evaluate for rib fracture pulmonary contusion pneumothorax hemothorax pneumonia.  Consider PE given report of brief episode of hemoptysis after fall.  Obtain CBC CMP troponin BNP EKG chest x-ray CTPA viral swab will need admission for further care and evaluation.

## 2022-12-07 NOTE — ED ADULT NURSE NOTE - NSIMPLEMENTINTERV_GEN_ALL_ED
Implemented All Fall Risk Interventions:  [place] to call system. Call bell, personal items and telephone within reach. Instruct patient to call for assistance. Room bathroom lighting operational. Non-slip footwear when patient is off stretcher. Physically safe environment: no spills, clutter or unnecessary equipment. Stretcher in lowest position, wheels locked, appropriate side rails in place. Provide visual cue, wrist band, yellow gown, etc. Monitor gait and stability. Monitor for mental status changes and reorient to person, place, and time. Review medications for side effects contributing to fall risk. Reinforce activity limits and safety measures with patient and family.

## 2022-12-07 NOTE — H&P ADULT - NSHPREVIEWOFSYSTEMS_GEN_ALL_CORE
CONSTITUTIONAL:  No weight loss, fever, chills, weakness or fatigue.  HEENT:  Eyes:  No visual loss, blurred vision, double vision or yellow sclerae. Ears, Nose, Throat:  No hearing loss, sneezing, congestion, runny nose or sore throat.  SKIN:  No rash or itching.  CARDIOVASCULAR:  No chest pain, chest pressure or chest discomfort. No palpitations or edema.  RESPIRATORY:  No shortness of breath, cough or sputum.  GASTROINTESTINAL:  No anorexia, nausea, vomiting or diarrhea. No abdominal pain or blood.  GENITOURINARY:  No dysuria, hematuria, or increased urinary frequency.  NEUROLOGICAL:  No headache, dizziness, syncope, paralysis, ataxia, numbness or tingling in the extremities. No change in bowel or bladder control.  MUSCULOSKELETAL:  No muscle, back pain, joint pain or stiffness.  HEMATOLOGIC:  No anemia, bleeding or bruising.  LYMPHATICS:  No enlarged nodes. No history of splenectomy.  PSYCHIATRIC:  No history of depression or anxiety.  ENDOCRINOLOGIC:  No reports of sweating, cold or heat intolerance. No polyuria or polydipsia.  ALLERGIES:  No history of asthma, hives, eczema or rhinitis. CONSTITUTIONAL:  No weight loss, fever, chills. +weakness/fatigue.  HEENT:  Eyes:  No visual loss, blurred vision, double vision or yellow sclerae. Ears, Nose, Throat:  No hearing loss, sneezing, congestion, runny nose or sore throat.  SKIN:  No rash or itching.  CARDIOVASCULAR:  No chest pain, chest pressure or chest discomfort. No palpitations or edema.  RESPIRATORY:  +shortness of breath, cough or sputum.  GASTROINTESTINAL:  No anorexia, nausea, vomiting or diarrhea. No abdominal pain or blood.  GENITOURINARY:  No dysuria, hematuria, or increased urinary frequency.  NEUROLOGICAL:  No headache, dizziness, syncope, paralysis, numbness or tingling in the extremities. No change in bowel or bladder control. + difficulty with balance at times  MUSCULOSKELETAL:  No muscle, back pain, joint pain or stiffness.  HEMATOLOGIC:  No anemia, bleeding or bruising.  LYMPHATICS:  No enlarged nodes. No history of splenectomy.  PSYCHIATRIC:  No history of depression or anxiety.  ENDOCRINOLOGIC:  No reports of sweating, cold or heat intolerance. No polyuria or polydipsia.  ALLERGIES:  No history of asthma, hives, eczema or rhinitis.

## 2022-12-07 NOTE — H&P ADULT - PROBLEM SELECTOR PLAN 1
- large left sided pleural effusion seen on CT, likely traumatic in setting of recent fall  - plan to consult pulmonology in the AM for thoracentesis  - sent pleural fluid studies to better characterized pleural effusion  - continue supplemental O2 as needed, can up-titrate to bipap if needed - large left sided pleural effusion seen on CT, likely traumatic in setting of recent fall  - plan to consult pulmonology in the AM for thoracentesis  - sent pleural fluid studies (including cytology) to better characterized pleural effusion  - continue supplemental O2 as needed, can up-titrate to bipap if needed  - continuous pulse ox  - empiric vanc/zosyn for concern of empyema-> D/C after thoracentesis if pleural fluid findings are not concerning for infection  - obtain TTE to r/o cardiac origin (less likely cardiac origin given unilateral, BNP WNL, patient does not appear hypervolemic on exam) - large left sided pleural effusion seen on CT, likely traumatic in setting of recent fall  - plan to consult pulmonology in the AM for thoracentesis  - send pleural fluid studies (including cytology) to better characterized pleural effusion  - continue supplemental O2 as needed, can up-titrate to bipap if needed  - continuous pulse ox  - empiric vanc/zosyn for concern of empyema-> D/C after thoracentesis if pleural fluid findings are not concerning for infection  - obtain TTE to r/o cardiac origin (less likely cardiac origin given unilateral, BNP WNL, patient does not appear hypervolemic on exam)

## 2022-12-07 NOTE — ED PROVIDER NOTE - PROGRESS NOTE DETAILS
Phong PGY1: Wet read of CXR consistent with large L pleural effusion w/ slight tracheal deviation explaining progressive dyspnea. On re-examination of patient with ultrasound ~1cm of fluid appreciated around L lung pleura at the apex. Patient is still hemodynamically stable satting 100% on 3LPM NC, HR 96, and BP of 154/79. CT chest PE protocol was expedited to better evaluate quality of effusion - thoracentesis in ED vs IR based on fluid complexity. Phong PGY1: CT chest PE protocol shows large L pleural effusion. Further clarification with radiologist (Dr. Flores) reveals that pleural effusion is simple fluid. Plan for pig tail catheter placement. Phong PGY1: CT chest PE protocol shows large L pleural effusion. Further clarification with radiologist (Dr. Flores) reveals that pleural effusion is simple fluid. Considering patient is hemodynamically stable with complaints of SOB for >1 week (hypoxic to 94% on RA in triage on 11/2), patient's pleural effusion would be better drained by IR as drainage is not emergent. Patient admitted to medicine.

## 2022-12-07 NOTE — H&P ADULT - NSHPSOCIALHISTORY_GEN_ALL_CORE
Patient was chain smoker 1ppd for 25 years, quit 30 yrs ago. No etoh of illicit drug use. Patient lives at home with wife and daughter. Sometimes ambulates with cane. Retired for 30+ years. Travels between  and [facility] for past 30 years, last trip was 6 months ago.

## 2022-12-07 NOTE — H&P ADULT - PROBLEM SELECTOR PLAN 2
- patient desating on room air, oxygenation improved on 3-4L NC  - treat pleural effusion as above  - wean O2 as tolerated

## 2022-12-07 NOTE — ED PROVIDER NOTE - PHYSICAL EXAMINATION
GENERAL: no acute distress, mesomorphic body habitus  HEENT: atraumatic, normocephalic, vision grossly intact, EOMI, no conjunctivitis or discharge, hearing grossly intact, no nasal discharge or epistaxis, clear pharynx, uvula midline  CV: normal rate, regular rhythm, normal S1/S2, no murmurs/rubs, no cyanosis, 2+ peripheral pulses in b/l U/L extremities, cap refill < 2 seconds  PULM: increased work of breathing with belly breathing, satting at 97-98% on 3LPM NC (drops to 92% on RA), diffuse expiratory wheezing worse with any movement  GI: soft/non-tender/nondistended abdomen, no guarding or rebound tenderness, no palpable masses  NEURO: A&Ox4, follows commands, normal speech, no focal motor or sensory deficits  MSK: no TTP of ribs or limbs, no joint swelling or erythema, ranging all extremities with no appreciable loss of ROM  EXT: no peripheral edema, calf tenderness, redness or swelling  SKIN: warm, dry, and intact, no rashes  PSYCH: appropriate mood and affect

## 2022-12-07 NOTE — H&P ADULT - ASSESSMENT
Pt is a 83yo M with PMH of type 2 DM, HTN, HLD, prior dengue infection (2019) who presents to ED with shortness of breath. Found to have large left sided pleural effusion.

## 2022-12-07 NOTE — H&P ADULT - NSHPLABSRESULTS_GEN_ALL_CORE
LABS:                        13.0   7.98  )-----------( 195      ( 07 Dec 2022 14:45 )             39.4     12-07    137  |  105  |  21  ----------------------------<  144<H>  4.4   |  21<L>  |  1.13    Ca    9.8      07 Dec 2022 14:45    TPro  6.6  /  Alb  3.9  /  TBili  0.6  /  DBili  x   /  AST  15  /  ALT  7   /  AlkPhos  81  12-07    PT/INR - ( 07 Dec 2022 18:00 )   PT: 13.8 sec;   INR: 1.19 ratio         PTT - ( 07 Dec 2022 18:00 )  PTT:55.3 sec          RADIOLOGY & ADDITIONAL TESTS:    Imaging Personally Reviewed:    Consultant(s) Notes Reviewed:      Care Discussed with Consultants/Other Providers: LABS:                        13.0   7.98  )-----------( 195      ( 07 Dec 2022 14:45 )             39.4     12-07    137  |  105  |  21  ----------------------------<  144<H>  4.4   |  21<L>  |  1.13    Ca    9.8      07 Dec 2022 14:45    TPro  6.6  /  Alb  3.9  /  TBili  0.6  /  DBili  x   /  AST  15  /  ALT  7   /  AlkPhos  81  12-07    PT/INR - ( 07 Dec 2022 18:00 )   PT: 13.8 sec;   INR: 1.19 ratio         PTT - ( 07 Dec 2022 18:00 )  PTT:55.3 sec          RADIOLOGY & ADDITIONAL TESTS:    Imaging Personally Reviewed:  CTA Chest  IMPRESSION:  No central pulmonary arterial thromboembolism. Evaluation of the   segmental and subsegmental pulmonary arteries is limited by significant   respiratory motion.      Large left pleural effusion with associated near complete compressive   atelectasis of the left lung.    Consultant(s) Notes Reviewed:      Care Discussed with Consultants/Other Providers:

## 2022-12-07 NOTE — H&P ADULT - HISTORY OF PRESENT ILLNESS
Patient interviewed with son at bedside. Patient wanted son to translate Tajik. Patient understands english but Tajik is preferred language.  offered but patient preferred son to translate.    Pt is a 81yo M with PMH of type 2 DM, HTN, HLD, prior dengue infection (2019) who presents to ED with shortness of breath. Patient was in usual state of health until 2 weeks ago when he fell at home. He hit his back on a chair. After that incident, patient has become progressively short of breath. Patient was brought to ED last week for similar symptom but left prior to being seen by provider due to time to be seen. Pt denies chest pain, fever/chills, night sweats, weight loss, n/v, abdominal pain.     Of note, patient had been dealing with gait troubles. When patient fell, he fell from stool. No loss of consciousness or head trauma. Follows with neurologist who performed MRI that had no remarkable findings. Patient ambulates with cane at times.  Patient interviewed with son at bedside. Patient wanted son to translate English. Patient understands english but English is preferred language.  offered but patient preferred son to translate.    Pt is a 81yo M with PMH of type 2 DM, HTN, HLD, prior dengue infection (2019) who presents to ED with shortness of breath. Patient was in usual state of health until 2 weeks ago when he fell at home. He hit his back on a chair. After that incident, patient has become progressively short of breath. Endorses dyspnea on exertion, not able to walk as far as he could before. Patient was brought to ED last week for similar symptom but left prior to being seen by provider due to time to be seen. Pt denies chest pain, fever/chills, night sweats, weight loss, n/v, abdominal pain.     Of note, patient had been dealing with gait troubles. When patient fell, he fell from stool. No loss of consciousness or head trauma. Follows with neurologist who performed MRI that had no remarkable findings. Patient ambulates with cane at times.

## 2022-12-07 NOTE — H&P ADULT - ATTENDING COMMENTS
This is an 81 y/o M with pmhx of DM2, HTN, HLD, presented for new onset SOB. The patient reported having a fall 1 week ago when he slipped backwards. Denies head trauma. Since then the patient has been having shortness of breath. Family noted that he is breathing faster than usual. Denies fevers at home. Patient denies cough. The patient was in the ED 4 days ago for shortness of breath but became frustrated because of long wait times. The patient's SOB did not improve, now has SOB with exertion and decreased ambulatory distance, due to fatigue.  Denies hx of cancer.    Physical exam shows a elderly male, NAD, comfortable at bedside, currently on 4L NC, lungs CTA on the R side but decreased breath sounds on the L, cardiac ss1s2 RRR no murmurs, abdomen soft nontender to palpation b/l, no LE edema noted. Appears tachypneic.    Labs show no leukocytosis, normal CBC, normal BMP, CT angio showed L sided large pleural effusion with atelectasis of L lung, CXR shows L sided pleural effusion    The patient is admitted for new onset L sided pleural effusion. Unclear etiology at this time. Could be infection vs. new onset malignancy given hx of smoking vs. transudative. Will start empiric abx for now to cover for empyema. Will need pulm consult for thoracocentesis and to send fluid for studies. Will also need to consider new onset malignancy, f/u with cytology of pleural effusion. Will obtain echo to evaluate for cardiac related causes. Monitor pulse ox closely. Low threshold for MICU consult if patient goes into respiratory distress. This is an 81 y/o M with pmhx of DM2, HTN, HLD, presented for new onset SOB. The patient reported having a fall 1 week ago when he slipped backwards. Denies head trauma. Since then the patient has been having shortness of breath. Family noted that he is breathing faster than usual. Denies fevers at home. Patient denies cough. The patient was in the ED 4 days ago for shortness of breath but became frustrated because of long wait times. The patient's SOB did not improve, now has SOB with exertion and decreased ambulatory distance, due to fatigue.  Denies hx of cancer.    Physical exam shows a elderly male, NAD, comfortable at bedside, currently on 4L NC, lungs CTA on the R side but decreased breath sounds on the L, cardiac ss1s2 RRR no murmurs, abdomen soft nontender to palpation b/l, no LE edema noted. Appears tachypneic.    Labs show no leukocytosis, normal CBC, normal BMP, CT angio showed L sided large pleural effusion with atelectasis of L lung, CXR shows L sided pleural effusion. EKG shows RBBB.    The patient is admitted for new onset L sided pleural effusion. Unclear etiology at this time. Could be infection vs. new onset malignancy given hx of smoking vs. transudative. Will start empiric abx for now to cover for empyema. Will need pulm consult for thoracocentesis and to send fluid for studies. Will also need to consider new onset malignancy, f/u with cytology of pleural effusion. Will obtain echo to evaluate for cardiac related causes. Monitor pulse ox closely. Low threshold for MICU consult if patient goes into respiratory distress.

## 2022-12-07 NOTE — ED ADULT TRIAGE NOTE - CHIEF COMPLAINT QUOTE
p/t living with DM type2, c.o of lt sided chest discomfort and sob for few days, p/t fell one week ago and hurt his ribs as well, appears tachypneic and uncomfortable

## 2022-12-07 NOTE — H&P ADULT - PROBLEM SELECTOR PLAN 5
- continue home atorvastatin - hold home lisinopril until after thoracentesis as patient may become hypotensive due to fluid shifts

## 2022-12-08 NOTE — PATIENT PROFILE ADULT - FUNCTIONAL ASSESSMENT - DAILY ACTIVITY SCORE.
Initiate Treatment: Sample of finacea or rx of azelaic acid\\nSulfur cleanser asepxia \\nValtrex 1G BID x 3 days \\nGabapentin 300 mg TID\\n\\nRx sent in [employer] Detail Level: Zone Render In Strict Bullet Format?: No 18

## 2022-12-08 NOTE — CONSULT NOTE ADULT - SUBJECTIVE AND OBJECTIVE BOX
82M with PMH of type 2 DM, HTN, HLD, prior dengue infection (2019) who presents to ED with shortness of breath. Patient was in usual state of health until 2 weeks ago when he fell at home. He hit his back on a chair. After that incident, patient has become progressively short of breath. Endorses dyspnea on exertion, not able to walk as far as he could before. Patient was brought to ED last week for similar symptom but left prior to being seen by provider due to time to be seen. Pt denies chest pain, fever/chills, night sweats, weight loss, n/v, abdominal pain.     Of note, patient had been dealing with gait troubles. When patient fell, he fell from stool. No loss of consciousness or head trauma. Follows with neurologist who performed MRI that had no remarkable findings. Patient ambulates with cane at times.     PAST MEDICAL & SURGICAL HISTORY:  Type II diabetes mellitus      Hypertension      Dengue fever      No significant past surgical history          FAMILY HISTORY:  FH: type 2 diabetes (Sibling)    FH: HTN (hypertension) (Sibling)        SOCIAL HISTORY:  Smoking: [ ] Never Smoked [X] Former Smoker (__ packs x ___ years) [ ] Current Smoker  (__ packs x ___ years)  Substance Use: [ ] Never Used [ ] Used ____  EtOH Use:  Marital Status: [ ] Single [ ]  [ ]  [ ]   Sexual History:   Occupation:  Recent Travel:  Country of Birth:  Advance Directives:    Allergies    No Known Allergies    Intolerances        HOME MEDICATIONS:    REVIEW OF SYSTEMS:  Constitutional: [X ] negative [ ] fevers [ ] chills [ ] weight loss [ ] weight gain  HEENT: [X ] negative [ ] dry eyes [ ] eye irritation [ ] postnasal drip [ ] nasal congestion  CV: [X ] negative  [ ] chest pain [ ] orthopnea [ ] palpitations [ ] murmur  Resp: [ ] negative [ ] cough [X ] shortness of breath [ ] dyspnea [ ] wheezing [ ] sputum [ ] hemoptysis  GI: [X ] negative [ ] nausea [ ] vomiting [ ] diarrhea [ ] constipation [ ] abd pain [ ] dysphagia   : [ X] negative [ ] dysuria [ ] nocturia [ ] hematuria [ ] increased urinary frequency  Musculoskeletal: [ ] negative [ ] back pain [ ] myalgias [ ] arthralgias [ ] fracture  Skin: [ ] negative [ ] rash [ ] itch  Neurological: [ ] negative [ ] headache [ ] dizziness [ ] syncope [ ] weakness [ ] numbness  Psychiatric: [ ] negative [ ] anxiety [ ] depression  Endocrine: [ ] negative [ ] diabetes [ ] thyroid problem  Hematologic/Lymphatic: [ ] negative [ ] anemia [ ] bleeding problem  Allergic/Immunologic: [ ] negative [ ] itchy eyes [ ] nasal discharge [ ] hives [ ] angioedema  [ ] All other systems negative  [ ] Unable to assess ROS because ________    OBJECTIVE:  ICU Vital Signs Last 24 Hrs  T(C): 36.8 (08 Dec 2022 09:52), Max: 37.2 (07 Dec 2022 21:07)  T(F): 98.3 (08 Dec 2022 09:52), Max: 98.9 (07 Dec 2022 21:07)  HR: 87 (08 Dec 2022 09:52) (69 - 96)  BP: 139/67 (08 Dec 2022 09:52) (134/64 - 176/72)  BP(mean): 85 (08 Dec 2022 04:25) (85 - 85)  ABP: --  ABP(mean): --  RR: 23 (08 Dec 2022 09:52) (18 - 34)  SpO2: 100% (08 Dec 2022 09:52) (90% - 100%)    O2 Parameters below as of 08 Dec 2022 09:52  Patient On (Oxygen Delivery Method): nasal cannula              CAPILLARY BLOOD GLUCOSE      POCT Blood Glucose.: 259 mg/dL (08 Dec 2022 11:00)      PHYSICAL EXAM:  General:  well appearing, tachypneic, NAD  HEENT: no icterus  Neck:  supple  Respiratory: decreased   Cardiovascular:   Abdomen:   Extremities:   Skin:   Neurological:  Psychiatry:    HOSPITAL MEDICATIONS:    piperacillin/tazobactam IVPB.. 3.375 Gram(s) IV Intermittent every 8 hours  vancomycin  IVPB 1000 milliGRAM(s) IV Intermittent every 12 hours      atorvastatin 20 milliGRAM(s) Oral at bedtime  dextrose 50% Injectable 25 Gram(s) IV Push once  dextrose 50% Injectable 12.5 Gram(s) IV Push once  dextrose 50% Injectable 25 Gram(s) IV Push once  dextrose Oral Gel 15 Gram(s) Oral once PRN  glucagon  Injectable 1 milliGRAM(s) IntraMuscular once  insulin lispro (ADMELOG) corrective regimen sliding scale   SubCutaneous three times a day before meals  insulin lispro (ADMELOG) corrective regimen sliding scale   SubCutaneous at bedtime              dextrose 5%. 1000 milliLiter(s) IV Continuous <Continuous>  dextrose 5%. 1000 milliLiter(s) IV Continuous <Continuous>            LABS:                        12.8   8.16  )-----------( 210      ( 08 Dec 2022 06:34 )             38.9     Hgb Trend: 12.8<--, 13.0<--  12-08    138  |  106  |  19  ----------------------------<  124<H>  4.3   |  22  |  1.17    Ca    9.4      08 Dec 2022 06:34  Phos  3.2     12-08  Mg     1.60     12-08    TPro  6.6  /  Alb  3.8  /  TBili  0.9  /  DBili  x   /  AST  19  /  ALT  9   /  AlkPhos  78  12-08    Creatinine Trend: 1.17<--, 1.13<--  PT/INR - ( 08 Dec 2022 06:34 )   PT: 14.0 sec;   INR: 1.20 ratio         PTT - ( 08 Dec 2022 06:34 )  PTT:54.1 sec          MICROBIOLOGY:     RADIOLOGY:  [ ] Reviewed and interpreted by me    PULMONARY FUNCTION TESTS:    EKG: 82M with PMH of type 2 DM, HTN, HLD, prior dengue infection (2019) who presents to ED with shortness of breath. Patient was in usual state of health until 2 weeks ago when he fell at home. He hit his back on a chair. After that incident, patient has become progressively short of breath. Endorses dyspnea on exertion, not able to walk as far as he could before. Patient was brought to ED last week for similar symptom but left prior to being seen by provider due to time to be seen. Pt denies chest pain, fever/chills, night sweats, weight loss, n/v, abdominal pain.     Of note, patient had been dealing with gait troubles. When patient fell, he fell from stool. No loss of consciousness or head trauma. Follows with neurologist who performed MRI that had no remarkable findings. Patient ambulates with cane at times.     PAST MEDICAL & SURGICAL HISTORY:  Type II diabetes mellitus      Hypertension      Dengue fever      No significant past surgical history          FAMILY HISTORY:  FH: type 2 diabetes (Sibling)    FH: HTN (hypertension) (Sibling)        SOCIAL HISTORY:  Smoking: [ ] Never Smoked [X] Former Smoker (__ packs x ___ years) [ ] Current Smoker  (__ packs x ___ years)  Substance Use: [ ] Never Used [ ] Used ____  EtOH Use:  Marital Status: [ ] Single [ ]  [ ]  [ ]   Sexual History:   Occupation:  Recent Travel:  Country of Birth:  Advance Directives:    Allergies    No Known Allergies    Intolerances        HOME MEDICATIONS:    REVIEW OF SYSTEMS:  Constitutional: [X ] negative [ ] fevers [ ] chills [ ] weight loss [ ] weight gain  HEENT: [X ] negative [ ] dry eyes [ ] eye irritation [ ] postnasal drip [ ] nasal congestion  CV: [X ] negative  [ ] chest pain [ ] orthopnea [ ] palpitations [ ] murmur  Resp: [ ] negative [ ] cough [X ] shortness of breath [ ] dyspnea [ ] wheezing [ ] sputum [ ] hemoptysis  GI: [X ] negative [ ] nausea [ ] vomiting [ ] diarrhea [ ] constipation [ ] abd pain [ ] dysphagia   : [ X] negative [ ] dysuria [ ] nocturia [ ] hematuria [ ] increased urinary frequency  Musculoskeletal: [ ] negative [ ] back pain [ ] myalgias [ ] arthralgias [ ] fracture  Skin: [ ] negative [ ] rash [ ] itch  Neurological: [ ] negative [ ] headache [ ] dizziness [ ] syncope [ ] weakness [ ] numbness  Psychiatric: [ ] negative [ ] anxiety [ ] depression  Endocrine: [ ] negative [ ] diabetes [ ] thyroid problem  Hematologic/Lymphatic: [ ] negative [ ] anemia [ ] bleeding problem  Allergic/Immunologic: [ ] negative [ ] itchy eyes [ ] nasal discharge [ ] hives [ ] angioedema  [ ] All other systems negative  [ ] Unable to assess ROS because ________    OBJECTIVE:  ICU Vital Signs Last 24 Hrs  T(C): 36.8 (08 Dec 2022 09:52), Max: 37.2 (07 Dec 2022 21:07)  T(F): 98.3 (08 Dec 2022 09:52), Max: 98.9 (07 Dec 2022 21:07)  HR: 87 (08 Dec 2022 09:52) (69 - 96)  BP: 139/67 (08 Dec 2022 09:52) (134/64 - 176/72)  BP(mean): 85 (08 Dec 2022 04:25) (85 - 85)  ABP: --  ABP(mean): --  RR: 23 (08 Dec 2022 09:52) (18 - 34)  SpO2: 100% (08 Dec 2022 09:52) (90% - 100%)    O2 Parameters below as of 08 Dec 2022 09:52  Patient On (Oxygen Delivery Method): nasal cannula              CAPILLARY BLOOD GLUCOSE      POCT Blood Glucose.: 259 mg/dL (08 Dec 2022 11:00)      PHYSICAL EXAM:  General:  well appearing, tachypneic, NAD  HEENT: no icterus  Neck:  supple  Respiratory: decreased   Cardiovascular:   Abdomen:   Extremities:   Skin:   Neurological:  Psychiatry:    HOSPITAL MEDICATIONS:    piperacillin/tazobactam IVPB.. 3.375 Gram(s) IV Intermittent every 8 hours  vancomycin  IVPB 1000 milliGRAM(s) IV Intermittent every 12 hours      atorvastatin 20 milliGRAM(s) Oral at bedtime  dextrose 50% Injectable 25 Gram(s) IV Push once  dextrose 50% Injectable 12.5 Gram(s) IV Push once  dextrose 50% Injectable 25 Gram(s) IV Push once  dextrose Oral Gel 15 Gram(s) Oral once PRN  glucagon  Injectable 1 milliGRAM(s) IntraMuscular once  insulin lispro (ADMELOG) corrective regimen sliding scale   SubCutaneous three times a day before meals  insulin lispro (ADMELOG) corrective regimen sliding scale   SubCutaneous at bedtime              dextrose 5%. 1000 milliLiter(s) IV Continuous <Continuous>  dextrose 5%. 1000 milliLiter(s) IV Continuous <Continuous>            LABS:                        12.8   8.16  )-----------( 210      ( 08 Dec 2022 06:34 )             38.9     Hgb Trend: 12.8<--, 13.0<--  12-08    138  |  106  |  19  ----------------------------<  124<H>  4.3   |  22  |  1.17    Ca    9.4      08 Dec 2022 06:34  Phos  3.2     12-08  Mg     1.60     12-08    TPro  6.6  /  Alb  3.8  /  TBili  0.9  /  DBili  x   /  AST  19  /  ALT  9   /  AlkPhos  78  12-08    Creatinine Trend: 1.17<--, 1.13<--  PT/INR - ( 08 Dec 2022 06:34 )   PT: 14.0 sec;   INR: 1.20 ratio         PTT - ( 08 Dec 2022 06:34 )  PTT:54.1 sec          MICROBIOLOGY:     RADIOLOGY:  [X] Reviewed and interpreted by me    PULMONARY FUNCTION TESTS:    EKG:

## 2022-12-08 NOTE — CONSULT NOTE ADULT - ATTENDING COMMENTS
82M with PMH of DMII, type 2 DM, HTN, HLD, prior dengue infection (2019), and remote smoking history, presents to the ER with 3 weeks of progressive shortness of breath found to have large left sided pleural effusion. Pulmonary consulted for further management.    Large pleural effusion on the left. Now s/p thoracentesis. However on US there appears to be a mass in the chest cavity. Would get repeat CT scan STAT to ensure no additional pleural fluid reaccumulates. S/p thoracentesis no significant fluid was left and patient was breathing comfortably.     Please follow up cytology labs  Will follow up CT chest.     Thank you for your consult. We will continue to follow the patient's care with you.

## 2022-12-08 NOTE — PROGRESS NOTE ADULT - PROBLEM SELECTOR PLAN 1
- large left sided pleural effusion seen on CT, likely traumatic in setting of recent fall  - plan to consult pulmonology in the AM for thoracentesis  - send pleural fluid studies (including cytology) to better characterized pleural effusion  - continue supplemental O2 as needed, can up-titrate to bipap if needed  - continuous pulse ox  - empiric vanc/zosyn for concern of empyema-> D/C after thoracentesis if pleural fluid findings are not concerning for infection  - obtain TTE to r/o cardiac origin (less likely cardiac origin given unilateral, BNP WNL, patient does not appear hypervolemic on exam) - large left sided pleural effusion seen on CT, likely traumatic in setting of recent fall  - pulm consulted, recs pending  - will send pleural fluid studies (including cytology) to better characterized pleural effusion  - continue supplemental O2 as needed, can up-titrate to bipap if needed  - continuous pulse ox  - empiric vanc/zosyn for concern of empyema-> D/C after thoracentesis if pleural fluid findings are not concerning for infection  - obtain TTE to r/o cardiac origin (less likely cardiac origin given unilateral, BNP WNL, patient does not appear hypervolemic on exam)

## 2022-12-08 NOTE — PROGRESS NOTE ADULT - PROBLEM SELECTOR PLAN 5
- hold home lisinopril until after thoracentesis as patient may become hypotensive due to fluid shifts

## 2022-12-08 NOTE — PROGRESS NOTE ADULT - SUBJECTIVE AND OBJECTIVE BOX
Todd Abreu MD  PGY 1 Department of Internal Medicine        Patient is a 82y old  Male who presents with a chief complaint of shortness of breath (07 Dec 2022 21:48)      SUBJECTIVE / OVERNIGHT EVENTS: Pt seen and examined. No acute overnight events. Denies fevers, chills, CP, SOB, Abdominal pain, N/V, Constipation, Diarrhea        MEDICATIONS  (STANDING):  atorvastatin 20 milliGRAM(s) Oral at bedtime  dextrose 5%. 1000 milliLiter(s) (50 mL/Hr) IV Continuous <Continuous>  dextrose 5%. 1000 milliLiter(s) (100 mL/Hr) IV Continuous <Continuous>  dextrose 50% Injectable 25 Gram(s) IV Push once  dextrose 50% Injectable 12.5 Gram(s) IV Push once  dextrose 50% Injectable 25 Gram(s) IV Push once  glucagon  Injectable 1 milliGRAM(s) IntraMuscular once  insulin lispro (ADMELOG) corrective regimen sliding scale   SubCutaneous three times a day before meals  insulin lispro (ADMELOG) corrective regimen sliding scale   SubCutaneous at bedtime  piperacillin/tazobactam IVPB.- 3.375 Gram(s) IV Intermittent once  piperacillin/tazobactam IVPB.. 3.375 Gram(s) IV Intermittent every 8 hours  vancomycin  IVPB 1000 milliGRAM(s) IV Intermittent every 12 hours    MEDICATIONS  (PRN):  dextrose Oral Gel 15 Gram(s) Oral once PRN Blood Glucose LESS THAN 70 milliGRAM(s)/deciliter      I&O's Summary      Vital Signs Last 24 Hrs  T(C): 37.2 (08 Dec 2022 04:25), Max: 37.2 (07 Dec 2022 21:07)  T(F): 98.9 (08 Dec 2022 04:25), Max: 98.9 (07 Dec 2022 21:07)  HR: 69 (08 Dec 2022 04:25) (69 - 96)  BP: 134/64 (08 Dec 2022 04:25) (134/64 - 176/72)  BP(mean): 85 (08 Dec 2022 04:25) (85 - 85)  RR: 30 (08 Dec 2022 04:25) (18 - 34)  SpO2: 100% (08 Dec 2022 04:25) (90% - 100%)    Parameters below as of 08 Dec 2022 04:25  Patient On (Oxygen Delivery Method): nasal cannula  O2 Flow (L/min): 3      CAPILLARY BLOOD GLUCOSE      POCT Blood Glucose.: 113 mg/dL (08 Dec 2022 06:51)  POCT Blood Glucose.: 97 mg/dL (08 Dec 2022 00:31)  POCT Blood Glucose.: 96 mg/dL (07 Dec 2022 18:08)  POCT Blood Glucose.: 216 mg/dL (07 Dec 2022 13:16)      PHYSICAL EXAM:  GENERAL: NAD,   HEAD:  Atraumatic, Normocephalic  EYES: EOMI, PERRL, conjunctiva and sclera clear  NECK: No JVD  CHEST/LUNG: Clear to auscultation bilaterally; No wheeze  HEART: Regular rate and rhythm; No murmurs, rubs, or gallops  ABDOMEN: Soft, Nontender, Nondistended; Bowel sounds present  EXTREMITIES:  2+ Peripheral Pulses, No clubbing, cyanosis, or edema  PSYCH: AAOx3  NEUROLOGY: non-focal  SKIN: No rashes or lesions       LABS:                        12.8   8.16  )-----------( 210      ( 08 Dec 2022 06:34 )             38.9     Auto Eosinophil # 0.42  / Auto Eosinophil % 5.1   / Auto Neutrophil # 5.64  / Auto Neutrophil % 69.1  / BANDS % x                            13.0   7.98  )-----------( 195      ( 07 Dec 2022 14:45 )             39.4     Auto Eosinophil # 0.34  / Auto Eosinophil % 4.3   / Auto Neutrophil # 5.63  / Auto Neutrophil % 70.4  / BANDS % x        12-08    138  |  106  |  19  ----------------------------<  124<H>  4.3   |  22  |  1.17  12-07    137  |  105  |  21  ----------------------------<  144<H>  4.4   |  21<L>  |  1.13    Ca    9.4      08 Dec 2022 06:34  Mg     1.60     12-08  Phos  3.2     12-08  TPro  6.6  /  Alb  3.8  /  TBili  0.9  /  DBili  x   /  AST  19  /  ALT  9   /  AlkPhos  78  12-08  TPro  6.6  /  Alb  3.9  /  TBili  0.6  /  DBili  x   /  AST  15  /  ALT  7   /  AlkPhos  81  12-07    PT/INR - ( 08 Dec 2022 06:34 )   PT: 14.0 sec;   INR: 1.20 ratio         PTT - ( 08 Dec 2022 06:34 )  PTT:54.1 sec              RADIOLOGY & ADDITIONAL TESTS:    Imaging Personally Reviewed:    Consultant(s) Notes Reviewed:      Care Discussed with Consultants/Other Providers:   Todd Abreu MD  PGY 1 Department of Internal Medicine        Patient is a 82y old  Male who presents with a chief complaint of shortness of breath (07 Dec 2022 21:48)      SUBJECTIVE / OVERNIGHT EVENTS: Pt seen and examined. No acute overnight events, still endorses some shortness of breath. Denies fevers, chills, cough, CP, Abdominal pain, N/V, Constipation, Diarrhea        MEDICATIONS  (STANDING):  atorvastatin 20 milliGRAM(s) Oral at bedtime  dextrose 5%. 1000 milliLiter(s) (50 mL/Hr) IV Continuous <Continuous>  dextrose 5%. 1000 milliLiter(s) (100 mL/Hr) IV Continuous <Continuous>  dextrose 50% Injectable 25 Gram(s) IV Push once  dextrose 50% Injectable 12.5 Gram(s) IV Push once  dextrose 50% Injectable 25 Gram(s) IV Push once  glucagon  Injectable 1 milliGRAM(s) IntraMuscular once  insulin lispro (ADMELOG) corrective regimen sliding scale   SubCutaneous three times a day before meals  insulin lispro (ADMELOG) corrective regimen sliding scale   SubCutaneous at bedtime  piperacillin/tazobactam IVPB.- 3.375 Gram(s) IV Intermittent once  piperacillin/tazobactam IVPB.. 3.375 Gram(s) IV Intermittent every 8 hours  vancomycin  IVPB 1000 milliGRAM(s) IV Intermittent every 12 hours    MEDICATIONS  (PRN):  dextrose Oral Gel 15 Gram(s) Oral once PRN Blood Glucose LESS THAN 70 milliGRAM(s)/deciliter      I&O's Summary      Vital Signs Last 24 Hrs  T(C): 37.2 (08 Dec 2022 04:25), Max: 37.2 (07 Dec 2022 21:07)  T(F): 98.9 (08 Dec 2022 04:25), Max: 98.9 (07 Dec 2022 21:07)  HR: 69 (08 Dec 2022 04:25) (69 - 96)  BP: 134/64 (08 Dec 2022 04:25) (134/64 - 176/72)  BP(mean): 85 (08 Dec 2022 04:25) (85 - 85)  RR: 30 (08 Dec 2022 04:25) (18 - 34)  SpO2: 100% (08 Dec 2022 04:25) (90% - 100%)    Parameters below as of 08 Dec 2022 04:25  Patient On (Oxygen Delivery Method): nasal cannula  O2 Flow (L/min): 3      CAPILLARY BLOOD GLUCOSE      POCT Blood Glucose.: 113 mg/dL (08 Dec 2022 06:51)  POCT Blood Glucose.: 97 mg/dL (08 Dec 2022 00:31)  POCT Blood Glucose.: 96 mg/dL (07 Dec 2022 18:08)  POCT Blood Glucose.: 216 mg/dL (07 Dec 2022 13:16)      PHYSICAL EXAM:  GENERAL: NAD,   HEAD:  Atraumatic, Normocephalic  EYES: EOMI, PERRL, conjunctiva and sclera clear  NECK: No JVD  CHEST/LUNG: (+) decreased breath sounds affecting entire L lung field with inspiratory crackles auscultated at R lung base  HEART: Regular rate and rhythm; No murmurs, rubs, or gallops  ABDOMEN: Soft, Nontender, Nondistended; Bowel sounds present  EXTREMITIES:  2+ Peripheral Pulses, No clubbing, cyanosis, or edema  PSYCH: AAOx3  NEUROLOGY: non-focal  SKIN: No rashes or lesions       LABS:                        12.8   8.16  )-----------( 210      ( 08 Dec 2022 06:34 )             38.9     Auto Eosinophil # 0.42  / Auto Eosinophil % 5.1   / Auto Neutrophil # 5.64  / Auto Neutrophil % 69.1  / BANDS % x                            13.0   7.98  )-----------( 195      ( 07 Dec 2022 14:45 )             39.4     Auto Eosinophil # 0.34  / Auto Eosinophil % 4.3   / Auto Neutrophil # 5.63  / Auto Neutrophil % 70.4  / BANDS % x        12-08    138  |  106  |  19  ----------------------------<  124<H>  4.3   |  22  |  1.17  12-07    137  |  105  |  21  ----------------------------<  144<H>  4.4   |  21<L>  |  1.13    Ca    9.4      08 Dec 2022 06:34  Mg     1.60     12-08  Phos  3.2     12-08  TPro  6.6  /  Alb  3.8  /  TBili  0.9  /  DBili  x   /  AST  19  /  ALT  9   /  AlkPhos  78  12-08  TPro  6.6  /  Alb  3.9  /  TBili  0.6  /  DBili  x   /  AST  15  /  ALT  7   /  AlkPhos  81  12-07    PT/INR - ( 08 Dec 2022 06:34 )   PT: 14.0 sec;   INR: 1.20 ratio         PTT - ( 08 Dec 2022 06:34 )  PTT:54.1 sec              RADIOLOGY & ADDITIONAL TESTS:    Imaging Personally Reviewed:    Consultant(s) Notes Reviewed:      Care Discussed with Consultants/Other Providers:   Todd Abreu MD  PGY 1 Department of Internal Medicine        Patient is a 82y old  Male who presents with a chief complaint of shortness of breath (07 Dec 2022 21:48)    : Paul, ID#[id number], [facility] interpretation    SUBJECTIVE / OVERNIGHT EVENTS: Pt seen and examined. No acute overnight events, still endorses some shortness of breath. Denies fevers, chills, cough, CP, Abdominal pain, N/V, Constipation, Diarrhea        MEDICATIONS  (STANDING):  atorvastatin 20 milliGRAM(s) Oral at bedtime  dextrose 5%. 1000 milliLiter(s) (50 mL/Hr) IV Continuous <Continuous>  dextrose 5%. 1000 milliLiter(s) (100 mL/Hr) IV Continuous <Continuous>  dextrose 50% Injectable 25 Gram(s) IV Push once  dextrose 50% Injectable 12.5 Gram(s) IV Push once  dextrose 50% Injectable 25 Gram(s) IV Push once  glucagon  Injectable 1 milliGRAM(s) IntraMuscular once  insulin lispro (ADMELOG) corrective regimen sliding scale   SubCutaneous three times a day before meals  insulin lispro (ADMELOG) corrective regimen sliding scale   SubCutaneous at bedtime  piperacillin/tazobactam IVPB.- 3.375 Gram(s) IV Intermittent once  piperacillin/tazobactam IVPB.. 3.375 Gram(s) IV Intermittent every 8 hours  vancomycin  IVPB 1000 milliGRAM(s) IV Intermittent every 12 hours    MEDICATIONS  (PRN):  dextrose Oral Gel 15 Gram(s) Oral once PRN Blood Glucose LESS THAN 70 milliGRAM(s)/deciliter      I&O's Summary      Vital Signs Last 24 Hrs  T(C): 37.2 (08 Dec 2022 04:25), Max: 37.2 (07 Dec 2022 21:07)  T(F): 98.9 (08 Dec 2022 04:25), Max: 98.9 (07 Dec 2022 21:07)  HR: 69 (08 Dec 2022 04:25) (69 - 96)  BP: 134/64 (08 Dec 2022 04:25) (134/64 - 176/72)  BP(mean): 85 (08 Dec 2022 04:25) (85 - 85)  RR: 30 (08 Dec 2022 04:25) (18 - 34)  SpO2: 100% (08 Dec 2022 04:25) (90% - 100%)    Parameters below as of 08 Dec 2022 04:25  Patient On (Oxygen Delivery Method): nasal cannula  O2 Flow (L/min): 3      CAPILLARY BLOOD GLUCOSE      POCT Blood Glucose.: 113 mg/dL (08 Dec 2022 06:51)  POCT Blood Glucose.: 97 mg/dL (08 Dec 2022 00:31)  POCT Blood Glucose.: 96 mg/dL (07 Dec 2022 18:08)  POCT Blood Glucose.: 216 mg/dL (07 Dec 2022 13:16)      PHYSICAL EXAM:  GENERAL: NAD,   HEAD:  Atraumatic, Normocephalic  EYES: EOMI, PERRL, conjunctiva and sclera clear  NECK: No JVD  CHEST/LUNG: (+) decreased breath sounds affecting entire L lung field with inspiratory crackles auscultated at R lung base  HEART: Regular rate and rhythm; No murmurs, rubs, or gallops  ABDOMEN: Soft, Nontender, Nondistended; Bowel sounds present  EXTREMITIES:  2+ Peripheral Pulses, No clubbing, cyanosis, or edema  PSYCH: AAOx3  NEUROLOGY: non-focal  SKIN: No rashes or lesions       LABS:                        12.8   8.16  )-----------( 210      ( 08 Dec 2022 06:34 )             38.9     Auto Eosinophil # 0.42  / Auto Eosinophil % 5.1   / Auto Neutrophil # 5.64  / Auto Neutrophil % 69.1  / BANDS % x                            13.0   7.98  )-----------( 195      ( 07 Dec 2022 14:45 )             39.4     Auto Eosinophil # 0.34  / Auto Eosinophil % 4.3   / Auto Neutrophil # 5.63  / Auto Neutrophil % 70.4  / BANDS % x        12-08    138  |  106  |  19  ----------------------------<  124<H>  4.3   |  22  |  1.17  12-07    137  |  105  |  21  ----------------------------<  144<H>  4.4   |  21<L>  |  1.13    Ca    9.4      08 Dec 2022 06:34  Mg     1.60     12-08  Phos  3.2     12-08  TPro  6.6  /  Alb  3.8  /  TBili  0.9  /  DBili  x   /  AST  19  /  ALT  9   /  AlkPhos  78  12-08  TPro  6.6  /  Alb  3.9  /  TBili  0.6  /  DBili  x   /  AST  15  /  ALT  7   /  AlkPhos  81  12-07    PT/INR - ( 08 Dec 2022 06:34 )   PT: 14.0 sec;   INR: 1.20 ratio         PTT - ( 08 Dec 2022 06:34 )  PTT:54.1 sec              RADIOLOGY & ADDITIONAL TESTS:    Imaging Personally Reviewed:    Consultant(s) Notes Reviewed:      Care Discussed with Consultants/Other Providers:

## 2022-12-08 NOTE — CONSULT NOTE ADULT - ASSESSMENT
Plan for thoracentesis today. Hold AC. Patient does not need to be NPO 82M with PMH of DMII, type 2 DM, HTN, HLD, prior dengue infection (2019), and remote smoking history, presents to the ER with 3 weeks of progressive shortness of breath found to have large left sided pleural effusion. Pulmonary consulted for further management.    #Large left sided unilateral effusion:  - complete left lung atelectasis on CT chest with large left sided pleural effusion  - ddx includes hemothorax (as patient with recent fall although no fractures noted), malignant effusion or parapneumonic effusion  - bedside POCUS with large effusion with heterogeneity in dependent fluid areas without septations  - s/p left sided thoracentesis with 2L of fluid removed--> serous cloudy fluid    Recommendations   - follow up post-procedure CXR to ensure no PTX  - follow up fluid studies including cell count, culture, chemistry and cytopath   - post procedure US with +lung sliding with large anterior subpleural consolidation vs mass and trace effusion remaining  - please obtain CT chest to evaluate lung parenchyma

## 2022-12-08 NOTE — PROGRESS NOTE ADULT - PROBLEM SELECTOR PLAN 2
- patient desating on room air, oxygenation improved on 3-4L NC  - treat pleural effusion as above  - wean O2 as tolerated - patient desating on room air, oxygenating well on 2LNC  - treat pleural effusion as above  - wean O2 as tolerated

## 2022-12-08 NOTE — PROGRESS NOTE ADULT - ATTENDING COMMENTS
Patient seen and examined with team  Agree with above a/p by Dr Abreu  Pt is a 81yo M with PMH of type 2 DM, HTN, HLD, prior dengue infection (2019) who presents to ED with shortness of breath. Found to have large left sided pleural effusion. Admitted for further management.  Pe nad  Vital Signs T(F): 98.3 HR: 92 , BP: 181/81 RR: 19 (08 Dec 2022 16:12) (19 - 34)  SpO2: 98% (08 Dec 2022 16:12) (98% - 100%) Patient On (Oxygen Delivery Method): nasal cannula O2 Flow (L/min): 4  Lungs dec BS to L lung, R lung clear, cor rrr, abd soft n/t, ext neg e/c/c    Labs                         12.8   8.16  )-----------( 210      ( 08 Dec 2022 06:34 )             38.9   12-08    138  |  106  |  19  ----------------------------<  124<H>  4.3   |  22  |  1.17    rad< from: CT Angio Chest PE Protocol w/ IV Cont (12.07.22 @ 17:56) >  IMPRESSION:  No central pulmonary arterial thromboembolism. Evaluation of the segmental and subsegmental pulmonary arteries is limited by significant   respiratory motion. Large left pleural effusion with associated near complete compressive atelectasis of the left lung.    < from: CT Head No Cont (12.08.22 @ 13:46) >neg  A/P Acute Resp failure with hypoxia sec to large L plueral effusion  # Pulm c/w NC oxygen. Pulmonary appreciated- tap in progress by pulmonary  f/u pleural effusion for culture and cyrology  #Id c/w Iv zosyn and vanco. monitor vanco trough in am  #Fall. ct of head- neg. HStrop- neg, tsh/b12- WNL. 2.6/883. Monitor PT and control BP  # HTN Sbp 188, start norvasc 5 mg qd- hold sbp <100, start hydralazine 10 mg tid x 24 hrs hold sbp <100  # dvt proph, teds, plan to stasrt Lovenox 12/9

## 2022-12-08 NOTE — PROCEDURE NOTE - NSUS ED ADDITIONAL DETAIL1 FT
Large left sided pleural effusion with some heterogeneity of fluid in the dependent areas without septations or loculations noted.

## 2022-12-08 NOTE — PATIENT PROFILE ADULT - FALL HARM RISK - HARM RISK INTERVENTIONS

## 2022-12-08 NOTE — PROGRESS NOTE ADULT - ASSESSMENT
Pt is a 83yo M with PMH of type 2 DM, HTN, HLD, prior dengue infection (2019) who presents to ED with shortness of breath. Found to have large left sided pleural effusion. Admitted for further management.

## 2022-12-08 NOTE — PROGRESS NOTE ADULT - PROBLEM SELECTOR PLAN 3
- patient follows with outpatient neurologist for gait issues  - check b12 in AM (given metformin use)  - PT consult

## 2022-12-08 NOTE — PROCEDURE NOTE - NSPROCDETAILS_GEN_ALL_CORE
location identified, draped/prepped, sterile technique used, needle inserted/introduced left sided/location identified, draped/prepped, sterile technique used, needle inserted/introduced

## 2022-12-09 NOTE — PROGRESS NOTE ADULT - ATTENDING COMMENTS
Patient seen and examined with team  Agree with above a/p by Dr Abreu  Pt is a 83yo M with PMH of type 2 DM, HTN, HLD, prior dengue infection (2019) who presents to ED with shortness of breath. Found to have large left sided pleural effusion. Admitted for further management. Had L lung thoracentesis done on 12/8.Ct of chest done 12/9 to r/o mass  Patient seen at bedside with son Paul Mcdonnell [phone] and  Phone Franciscaali  # [phone]  Patient assigned HCP to daughter Nahomi and son Mary.  Patient wants his children to translate in hospital for hin- no need to call - can call his daughter or son  Patient wants FULL CODE  Pe nad  Vital Signs Last 24 Hrs  T(F): 97.9 HR: 80 , BP: 135/75 , RR: 17 (09 Dec 2022 05:59) (17 - 22) SpO2: 98%  nasal cannula O2 Flow (L/min): 4  Lungs dec BS to L lung, R lung clear, cor rrr, abd soft n/t, ext neg e/c/c                        13.3   8.03  )-----------( 200      ( 09 Dec 2022 07:05 )             39.5       rad< from: CT Angio Chest PE Protocol w/ IV Cont (12.07.22 @ 17:56) >  IMPRESSION:  No central pulmonary arterial thromboembolism. Evaluation of the segmental and subsegmental pulmonary arteries is limited by significant   respiratory motion. Large left pleural effusion with associated near complete compressive atelectasis of the left lung.    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  Small multiloculated left pleural effusion with interval decrease insize   with interval improved aeration of the left lung as compared with   December 7, 2022.  Nonspecific minimal erosion of the left fourth rib.      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 Pulmonary appreciated- tap in progress by pulmonary  f/u pleural effusion for culture and cytology. F/u pulmonary for ? Bxp of Lung mass  #Id c/w Iv zosyn and vanco. monitor vanco trough . If blood culture is negative 12/10, can d/c vanco and zosyn  #Fall. ct of head- neg. HStrop- neg, tsh/b12- WNL. 2.6/883. Monitor PT and control BP  # HTN /75 today-c/w  norvasc 5 mg qd- hold sbp <100  # dvt proph, - if no plan for Bxp today- start Lovenox 40 mg sq qd  # GOC- patient wants daughter Nahomi Jeffery [phone] and son Aston Herbert [phone] to be HCP. Patient wants FULL CODE  plan d/w patient / son  Sathya/rn/ team Patient seen and examined with team  Agree with above a/p by Dr Abreu  Pt is a 81yo M with PMH of type 2 DM, HTN, HLD, prior dengue infection (2019) who presents to ED with shortness of breath. Found to have large left sided pleural effusion. Admitted for further management. Had L lung thoracentesis done on 12/8.Ct of chest done 12/9 to r/o mass  Patient seen at bedside with son Paul Mcdonnell [phone] and  Phone Franciscaali  # [phone]  Patient assigned HCP to daughter Nahomi and son Mary.  Patient wants his children to translate in hospital for hin- no need to call - can call his daughter or son  Patient wants FULL CODE  Pe nad  Vital Signs Last 24 Hrs  T(F): 97.9 HR: 80 , BP: 135/75 , RR: 17 (09 Dec 2022 05:59) (17 - 22) SpO2: 98%  nasal cannula O2 Flow (L/min): 4  Lungs dec BS to L lung, R lung clear, cor rrr, abd soft n/t, ext neg e/c/c                        13.3   8.03  )-----------( 200      ( 09 Dec 2022 07:05 )             39.5       rad< from: CT Angio Chest PE Protocol w/ IV Cont (12.07.22 @ 17:56) >  IMPRESSION:  No central pulmonary arterial thromboembolism. Evaluation of the segmental and subsegmental pulmonary arteries is limited by significant   respiratory motion. Large left pleural effusion with associated near complete compressive atelectasis of the left lung.    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  Small multiloculated left pleural effusion with interval decrease insize   with interval improved aeration of the left lung as compared with   December 7, 2022.  Nonspecific minimal erosion of the left fourth rib.      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 Pulmonary appreciated- tap in progress by pulmonary  f/u pleural effusion for culture and cytology. F/u pulmonary for ? Bxp of Lung mass  #Id c/w Iv zosyn and vanco. monitor vanco trough . If blood culture is negative 12/10, can d/c vanco and zosyn  #Fall. ct of head- neg. HStrop- neg, tsh/b12- WNL. 2.6/883. Monitor PT and control BP  # HTN /75 today-c/w  norvasc 5 mg qd- hold sbp <100  # dvt proph, - if no plan for Bxp today- start Lovenox 40 mg sq qd  # GOC- patient wants daughter Nahomi Jeffery [phone] and son Aston Herbert [phone] to be HCP. Patient wants FULL CODE  plan d/w patient / son Mr Mcdonnell/rn/ team    Addendum 12/9 2:20 pm  Pulm rec CT a/p to asses for mets/ bxp nodes Patient seen and examined with team  Agree with above a/p by Dr Abreu  Pt is a 83yo M with PMH of type 2 DM, HTN, HLD, prior dengue infection (2019) who presents to ED with shortness of breath. Found to have large left sided pleural effusion. Admitted for further management. Had L lung thoracentesis done on 12/8.Ct of chest done 12/9 to r/o mass  Patient seen at bedside with son Paul Mcdonnell [phone] and  Phone Franciscaali  # [phone]  Patient assigned HCP to daughter Nahomi and son Mary.  Patient wants his children to translate in hospital for hin- no need to call - can call his daughter or son  Patient wants FULL CODE  Pe nad  Vital Signs Last 24 Hrs  T(F): 97.9 HR: 80 , BP: 135/75 , RR: 17 (09 Dec 2022 05:59) (17 - 22) SpO2: 98%  nasal cannula O2 Flow (L/min): 4  Lungs dec BS to L lung, R lung clear, cor rrr, abd soft n/t, ext neg e/c/c                        13.3   8.03  )-----------( 200      ( 09 Dec 2022 07:05 )             39.5       rad< from: CT Angio Chest PE Protocol w/ IV Cont (12.07.22 @ 17:56) >  IMPRESSION:  No central pulmonary arterial thromboembolism. Evaluation of the segmental and subsegmental pulmonary arteries is limited by significant   respiratory motion. Large left pleural effusion with associated near complete compressive atelectasis of the left lung.    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  Small multiloculated left pleural effusion with interval decrease insize   with interval improved aeration of the left lung as compared with   December 7, 2022.  Nonspecific minimal erosion of the left fourth rib.      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 Pulmonary appreciated- pulmonary completed thoracenthesis 12/8  f/u pleural effusion for culture and cytology. F/u pulmonary for ? Bxp of Lung mass  #Id c/w Iv zosyn and vanco. monitor vanco trough . If blood culture is negative 12/10, can d/c vanco and zosyn  #Fall. ct of head- neg. HStrop- neg, tsh/b12- WNL. 2.6/883. Monitor PT and control BP  # HTN /75 today-c/w  norvasc 5 mg qd- hold sbp <100  # dvt proph, - if no plan for Bxp today- start Lovenox 40 mg sq qd  # GOC- patient wants daughter Nahomi Jeffery [phone] and son Aston Herbert [phone] to be HCP. Patient wants FULL CODE  plan d/w patient / son  Sathya/rn/ team    Addendum 12/9 2:20 pm  Pulm rec CT a/p to asses for mets/ bxp nodes

## 2022-12-09 NOTE — PROVIDER CONTACT NOTE (OTHER) - ASSESSMENT
Pt A&O X4, clincally stable. Pt refusing abx. R used  to explain benefits and side effects of meds. Pt still refuse Pt A&O X4, clinically stable. Pt refusing abx. R used  to explain benefits and side effects of meds. Pt still refuse

## 2022-12-09 NOTE — PROGRESS NOTE ADULT - ASSESSMENT
82M with PMH of DMII, type 2 DM, HTN, HLD, prior dengue infection (2019), and remote smoking history, presents to the ER with 3 weeks of progressive shortness of breath found to have large left sided pleural effusion. Pulmonary consulted for further management.    #Large left sided unilateral effusion:  - complete left lung atelectasis on CT chest with large left sided pleural effusion  - s/p thoracentesis with 2L of cloudy serous fluid removed; fluid is exudative by lights criteria and glucose/pH WNL; cx negative  - s/p repeat CT chest showing large left upper lobe mass with areas of loculated pleural effusion    Recommendations   - given lung mass finding and pleural effusion, concern for metastatic lung cancer  - would recommend CT A/P inpatient to evaluate for any other evidence of metastatic lesions  - can follow up cytopath from pleural fluid; however if nondiagnostic will need tissue diagnosis   - if no acute intervention required based on CT A/P findings, patient can be discharged from a pulmonary perspective and follow up for cytopath results

## 2022-12-09 NOTE — PROGRESS NOTE ADULT - PROBLEM SELECTOR PLAN 4
- patient on metformin and glimeperide at home  - low dose ISS while in-patient - patient follows with outpatient neurologist for gait issues  - b12 wnl  - PT consulted

## 2022-12-09 NOTE — PROGRESS NOTE ADULT - PROBLEM SELECTOR PLAN 3
- patient follows with outpatient neurologist for gait issues  - b12 wnl  - PT consult - patient desating on room air, oxygenating well on 2LNC  - treat pleural effusion as above  - wean O2 as tolerated

## 2022-12-09 NOTE — PROGRESS NOTE ADULT - PROBLEM SELECTOR PLAN 2
- patient desating on room air, oxygenating well on 2LNC  - treat pleural effusion as above  - wean O2 as tolerated - large left sided pleural effusion seen on CT, likely traumatic in setting of recent fall  - pulm consulted, recs pending  - will send pleural fluid studies (including cytology) to better characterized pleural effusion  - continue supplemental O2 as needed, can up-titrate to bipap if needed  - continuous pulse ox  - empiric vanc/zosyn for concern of empyema-> D/C after thoracentesis if pleural fluid findings are not concerning for infection  - obtain TTE to r/o cardiac origin (less likely cardiac origin given unilateral, BNP WNL, patient does not appear hypervolemic on exam)

## 2022-12-09 NOTE — PROVIDER CONTACT NOTE (OTHER) - SITUATION
Pt refusing vital sign assesmetn Pt has order for Q4 vitals Pt refusing vital sign assessment Pt has order for Q4 vitals

## 2022-12-09 NOTE — PROGRESS NOTE ADULT - SUBJECTIVE AND OBJECTIVE BOX
Todd Abreu MD  PGY 1 Department of Internal Medicine        Patient is a 82y old  Male who presents with a chief complaint of shortness of breath (08 Dec 2022 11:42)      SUBJECTIVE / OVERNIGHT EVENTS: Pt seen and examined. No acute overnight events. Denies fevers, chills, CP, SOB, Abdominal pain, N/V, Constipation, Diarrhea        MEDICATIONS  (STANDING):  amLODIPine   Tablet 5 milliGRAM(s) Oral daily  atorvastatin 20 milliGRAM(s) Oral at bedtime  dextrose 5%. 1000 milliLiter(s) (50 mL/Hr) IV Continuous <Continuous>  dextrose 5%. 1000 milliLiter(s) (100 mL/Hr) IV Continuous <Continuous>  dextrose 50% Injectable 25 Gram(s) IV Push once  dextrose 50% Injectable 12.5 Gram(s) IV Push once  dextrose 50% Injectable 25 Gram(s) IV Push once  glucagon  Injectable 1 milliGRAM(s) IntraMuscular once  influenza  Vaccine (HIGH DOSE) 0.7 milliLiter(s) IntraMuscular once  insulin lispro (ADMELOG) corrective regimen sliding scale   SubCutaneous three times a day before meals  insulin lispro (ADMELOG) corrective regimen sliding scale   SubCutaneous at bedtime  piperacillin/tazobactam IVPB.. 3.375 Gram(s) IV Intermittent every 8 hours  vancomycin  IVPB 1000 milliGRAM(s) IV Intermittent every 12 hours    MEDICATIONS  (PRN):  acetaminophen     Tablet .. 650 milliGRAM(s) Oral every 6 hours PRN Mild Pain (1 - 3), Moderate Pain (4 - 6)  dextrose Oral Gel 15 Gram(s) Oral once PRN Blood Glucose LESS THAN 70 milliGRAM(s)/deciliter      I&O's Summary    08 Dec 2022 07:01  -  09 Dec 2022 07:00  --------------------------------------------------------  IN: 0 mL / OUT: 200 mL / NET: -200 mL        Vital Signs Last 24 Hrs  T(C): 36.6 (09 Dec 2022 05:59), Max: 37.5 (08 Dec 2022 21:44)  T(F): 97.9 (09 Dec 2022 05:59), Max: 99.5 (08 Dec 2022 21:44)  HR: 80 (09 Dec 2022 05:59) (80 - 98)  BP: 135/75 (09 Dec 2022 05:59) (127/71 - 181/81)  BP(mean): --  RR: 17 (09 Dec 2022 05:59) (17 - 30)  SpO2: 98% (09 Dec 2022 05:59) (98% - 100%)    Parameters below as of 09 Dec 2022 05:59  Patient On (Oxygen Delivery Method): nasal cannula  O2 Flow (L/min): 4      CAPILLARY BLOOD GLUCOSE      POCT Blood Glucose.: 178 mg/dL (08 Dec 2022 21:56)  POCT Blood Glucose.: 131 mg/dL (08 Dec 2022 17:53)  POCT Blood Glucose.: 259 mg/dL (08 Dec 2022 11:00)      PHYSICAL EXAM:  GENERAL: NAD,   HEAD:  Atraumatic, Normocephalic  EYES: EOMI, PERRL, conjunctiva and sclera clear  NECK: No JVD  CHEST/LUNG: (+) decreased breath sounds affecting entire L lung field with inspiratory crackles auscultated at R lung base  HEART: Regular rate and rhythm; No murmurs, rubs, or gallops  ABDOMEN: Soft, Nontender, Nondistended; Bowel sounds present  EXTREMITIES:  2+ Peripheral Pulses, No clubbing, cyanosis, or edema  PSYCH: AAOx3  NEUROLOGY: non-focal  SKIN: No rashes or lesions      LABS:                        12.8   8.16  )-----------( 210      ( 08 Dec 2022 06:34 )             38.9     Auto Eosinophil # 0.42  / Auto Eosinophil % 5.1   / Auto Neutrophil # 5.64  / Auto Neutrophil % 69.1  / BANDS % x                            13.0   7.98  )-----------( 195      ( 07 Dec 2022 14:45 )             39.4     Auto Eosinophil # 0.34  / Auto Eosinophil % 4.3   / Auto Neutrophil # 5.63  / Auto Neutrophil % 70.4  / BANDS % x        12-08    138  |  106  |  19  ----------------------------<  124<H>  4.3   |  22  |  1.17  12-07    137  |  105  |  21  ----------------------------<  144<H>  4.4   |  21<L>  |  1.13    Ca    9.4      08 Dec 2022 06:34  Mg     1.60     12-08  Phos  3.2     12-08  TPro  6.6  /  Alb  3.8  /  TBili  0.9  /  DBili  x   /  AST  19  /  ALT  9   /  AlkPhos  78  12-08  TPro  6.6  /  Alb  3.9  /  TBili  0.6  /  DBili  x   /  AST  15  /  ALT  7   /  AlkPhos  81  12-07    PT/INR - ( 08 Dec 2022 06:34 )   PT: 14.0 sec;   INR: 1.20 ratio         PTT - ( 08 Dec 2022 06:34 )  PTT:54.1 sec              RADIOLOGY & ADDITIONAL TESTS:    Imaging Personally Reviewed:    Consultant(s) Notes Reviewed:      Care Discussed with Consultants/Other Providers:   Todd Abreu MD  PGY 1 Department of Internal Medicine        Patient is a 82y old  Male who presents with a chief complaint of shortness of breath (08 Dec 2022 11:42)      SUBJECTIVE / OVERNIGHT EVENTS: Pt seen and examined. No acute overnight events. Denies fevers, chills, CP, SOB, Abdominal pain, N/V, Constipation, Diarrhea        MEDICATIONS  (STANDING):  amLODIPine   Tablet 5 milliGRAM(s) Oral daily  atorvastatin 20 milliGRAM(s) Oral at bedtime  dextrose 5%. 1000 milliLiter(s) (50 mL/Hr) IV Continuous <Continuous>  dextrose 5%. 1000 milliLiter(s) (100 mL/Hr) IV Continuous <Continuous>  dextrose 50% Injectable 25 Gram(s) IV Push once  dextrose 50% Injectable 12.5 Gram(s) IV Push once  dextrose 50% Injectable 25 Gram(s) IV Push once  glucagon  Injectable 1 milliGRAM(s) IntraMuscular once  influenza  Vaccine (HIGH DOSE) 0.7 milliLiter(s) IntraMuscular once  insulin lispro (ADMELOG) corrective regimen sliding scale   SubCutaneous three times a day before meals  insulin lispro (ADMELOG) corrective regimen sliding scale   SubCutaneous at bedtime  piperacillin/tazobactam IVPB.. 3.375 Gram(s) IV Intermittent every 8 hours  vancomycin  IVPB 1000 milliGRAM(s) IV Intermittent every 12 hours    MEDICATIONS  (PRN):  acetaminophen     Tablet .. 650 milliGRAM(s) Oral every 6 hours PRN Mild Pain (1 - 3), Moderate Pain (4 - 6)  dextrose Oral Gel 15 Gram(s) Oral once PRN Blood Glucose LESS THAN 70 milliGRAM(s)/deciliter      I&O's Summary    08 Dec 2022 07:01  -  09 Dec 2022 07:00  --------------------------------------------------------  IN: 0 mL / OUT: 200 mL / NET: -200 mL        Vital Signs Last 24 Hrs  T(C): 36.6 (09 Dec 2022 05:59), Max: 37.5 (08 Dec 2022 21:44)  T(F): 97.9 (09 Dec 2022 05:59), Max: 99.5 (08 Dec 2022 21:44)  HR: 80 (09 Dec 2022 05:59) (80 - 98)  BP: 135/75 (09 Dec 2022 05:59) (127/71 - 181/81)  BP(mean): --  RR: 17 (09 Dec 2022 05:59) (17 - 30)  SpO2: 98% (09 Dec 2022 05:59) (98% - 100%)    Parameters below as of 09 Dec 2022 05:59  Patient On (Oxygen Delivery Method): nasal cannula  O2 Flow (L/min): 4      CAPILLARY BLOOD GLUCOSE      POCT Blood Glucose.: 178 mg/dL (08 Dec 2022 21:56)  POCT Blood Glucose.: 131 mg/dL (08 Dec 2022 17:53)  POCT Blood Glucose.: 259 mg/dL (08 Dec 2022 11:00)      PHYSICAL EXAM:  GENERAL: NAD, lying in bed  HEAD:  Atraumatic, Normocephalic  EYES: EOMI, PERRL, conjunctiva and sclera clear  NECK: No JVD  CHEST/LUNG: Inspiratory crackles auscultated at R lung base, no wheezing, no rhonchi  HEART: Regular rate and rhythm; No murmurs, rubs, or gallops  ABDOMEN: Soft, Nontender, Nondistended; Bowel sounds present  EXTREMITIES:  2+ Peripheral Pulses, No clubbing, cyanosis, or edema  PSYCH: AAOx3  NEUROLOGY: non-focal  SKIN: No rashes or lesions      LABS:                        12.8   8.16  )-----------( 210      ( 08 Dec 2022 06:34 )             38.9     Auto Eosinophil # 0.42  / Auto Eosinophil % 5.1   / Auto Neutrophil # 5.64  / Auto Neutrophil % 69.1  / BANDS % x                            13.0   7.98  )-----------( 195      ( 07 Dec 2022 14:45 )             39.4     Auto Eosinophil # 0.34  / Auto Eosinophil % 4.3   / Auto Neutrophil # 5.63  / Auto Neutrophil % 70.4  / BANDS % x        12-08    138  |  106  |  19  ----------------------------<  124<H>  4.3   |  22  |  1.17  12-07    137  |  105  |  21  ----------------------------<  144<H>  4.4   |  21<L>  |  1.13    Ca    9.4      08 Dec 2022 06:34  Mg     1.60     12-08  Phos  3.2     12-08  TPro  6.6  /  Alb  3.8  /  TBili  0.9  /  DBili  x   /  AST  19  /  ALT  9   /  AlkPhos  78  12-08  TPro  6.6  /  Alb  3.9  /  TBili  0.6  /  DBili  x   /  AST  15  /  ALT  7   /  AlkPhos  81  12-07    PT/INR - ( 08 Dec 2022 06:34 )   PT: 14.0 sec;   INR: 1.20 ratio         PTT - ( 08 Dec 2022 06:34 )  PTT:54.1 sec              RADIOLOGY & ADDITIONAL TESTS:    CT Chest No Cont (12.09.22 @ 09:03)  FINDINGS:    LUNGS AND AIRWAYS/PLEURA: Patent central airways.  Interval improved   aeration of the left lung since December 7, 2022. Multiloculated small   left pleural effusion, interval decrease in size, with some nonspecific   left lower lobe curvilinear or patchy opacity within the aerated left   lower lobe as compared with the prior study.    There is a 5 x 4.3 cm left upper lobe mass with parts that are   inseparable from the mediastinal fat and the adjacent anterolateral   pleura. There is encasement and associated left upper lobe segmental   airway. Mild nodularity of adjacent pleural surface is of unclear   etiology. Minimal erosive change of the anterolateral aspect of the left   fourth rib. Small subcentimeter calcified granulomas in left lower lobe.  MEDIASTINUM AND CAROLINE: There is a mildly enlarged lymph node in the   prevascular space measuring 1 cm and a prominent lymph node in the   azygoesophageal recess measuring 1 cm.  VESSELS: Coronary artery and aortic calcifications. The aortic root is   mildly prominent measuring about 4 cm.  HEART: Heart size is mildly enlarged.  CHEST WALL AND LOWER NECK: Within normal limits.  VISUALIZED UPPER ABDOMEN: Subcentimeter hypodense hepatic lesion too   small to characterize. Bilateral adrenal hypertrophy.  BONES: Chronic right fifth rib fracture with callus formation. Minimal   erosion of the inner cortex of the anterolateral aspect of left fourth   rib. Degenerative changes of the spine.    IMPRESSION:  5 x 4.3 cmleft upper lobe mass as described above likely neoplastic in   etiology and representing a primary lung neoplasm with mildly enlarged   mediastinal lymph node as described above.    Small multiloculated left pleural effusion with interval decrease insize   with interval improved aeration of the left lung as compared with   December 7, 2022.    Nonspecific minimal erosion of the left fourth rib.        Imaging Personally Reviewed: CT chest    Consultant(s) Notes Reviewed:  pulmonology    Care Discussed with Consultants/Other Providers: pulmonology

## 2022-12-09 NOTE — GOALS OF CARE CONVERSATION - ADVANCED CARE PLANNING - CONVERSATION DETAILS
GOC- patient wants daughter Nahomi Jeffery [phone] and son Aston Herbert [phone] to be HCP. Patient wants FULL CODE  plan d/w patient / son Mr Mcdonnell/rn/ team Pt is a 81yo M with PMH of type 2 DM, HTN, HLD, prior dengue infection (2019) who presents to ED with shortness of breath. Found to have large left sided pleural effusion. Admitted for further management. Had L lung thoracentesis done on 12/8.Ct of chest done 12/9 to r/o mass  Patient seen at bedside with son Paul Fischeranastasia [phone] and  Phone [employer]  # [phone]  Patient assigned HCP to daughter Nahomi and son Mary.  Patient wants his children to translate in hospital for hin- no need to call - can call his daughter or son  Patient wants FULL CODE      GOC- patient wants daughter Nahomi Jeffery [phone] and son Aston Herbert [phone] to be HCP. Patient wants FULL CODE  plan d/w patient / son Mr Mcdonnell/rn/ team Pt is a 81yo M with PMH of type 2 DM, HTN, HLD, prior dengue infection (2019) who presents to ED with shortness of breath. Found to have large left sided pleural effusion. Admitted for further management. Had L lung thoracentesis done on 12/8.Ct of chest done 12/9 to r/o mass  Patient seen at bedside with son Paul Mcdonnell [phone] and  Phone [facility]  # [phone]  Patient assigned HCP to daughter Nahomi and son Mary.  Patient wants his children to translate in hospital for hin- no need to call - can call his daughter or son  Patient wants FULL CODE  CT Chest No Cont (12.09.22 @ 09:03) >  IMPRESSION:  5 x 4.3 cmleft upper lobe mass as described above likely neoplastic in   etiology and representing a primary lung neoplasm with mildly enlarged   mediastinal lymph node as described above.  Small multiloculated left pleural effusion with interval decrease insize   with interval improved aeration of the left lung as compared with   December 7, 2022.  Nonspecific minimal erosion of the left fourth rib.      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 Pulmonary appreciated- tap in progress by pulmonary  f/u pleural effusion for culture and cytology. F/u pulmonary for ? Bxp of Lung mass  #Id c/w Iv zosyn and vanco. monitor vanco trough . If blood culture is negative 12/10, can d/c vanco and zosyn  #Fall. ct of head- neg. HStrop- neg, tsh/b12- WNL. 2.6/883. Monitor PT and control BP  # HTN /75 today-c/w  norvasc 5 mg qd- hold sbp <100  # dvt proph, - if no plan for Bxp today- start Lovenox 40 mg sq qd  # GOC- patient wants daughter Nahomi Jeffery [phone] and son Aston Herbert [phone] to be HCP. Patient wants FULL CODE  plan d/w patient / son Mr Mcdonnell/rn/ team .      GOC- patient wants daughter Nahomi Jeffery [phone] and son Aston Herbert [phone] to be HCP. Patient wants FULL CODE  plan d/w patient / son Mr Mcdonnell/team

## 2022-12-09 NOTE — PHYSICAL THERAPY INITIAL EVALUATION ADULT - LEVEL OF INDEPENDENCE: GAIT, REHAB EVAL
The pt has been accepted to [facility] via [facility].        10/01/20 1113   Post-Acute Status   Post-Acute Authorization Home Health   Home Health Status Additional Clinical Requested   Discharge Plan   Discharge Plan A Home Health      supervision

## 2022-12-09 NOTE — PROGRESS NOTE ADULT - PROBLEM SELECTOR PLAN 1
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

## 2022-12-09 NOTE — PROGRESS NOTE ADULT - SUBJECTIVE AND OBJECTIVE BOX
CHIEF COMPLAINT:    Interval Events:    REVIEW OF SYSTEMS:  Constitutional: [ ] negative [ ] fevers [ ] chills [ ] weight loss [ ] weight gain  HEENT: [ ] negative [ ] dry eyes [ ] eye irritation [ ] postnasal drip [ ] nasal congestion  CV: [ ] negative  [ ] chest pain [ ] orthopnea [ ] palpitations [ ] murmur  Resp: [ ] negative [ ] cough [ ] shortness of breath [ ] dyspnea [ ] wheezing [ ] sputum [ ] hemoptysis  GI: [ ] negative [ ] nausea [ ] vomiting [ ] diarrhea [ ] constipation [ ] abd pain [ ] dysphagia   : [ ] negative [ ] dysuria [ ] nocturia [ ] hematuria [ ] increased urinary frequency  Musculoskeletal: [ ] negative [ ] back pain [ ] myalgias [ ] arthralgias [ ] fracture  Skin: [ ] negative [ ] rash [ ] itch  Neurological: [ ] negative [ ] headache [ ] dizziness [ ] syncope [ ] weakness [ ] numbness  Psychiatric: [ ] negative [ ] anxiety [ ] depression  Endocrine: [ ] negative [ ] diabetes [ ] thyroid problem  Hematologic/Lymphatic: [ ] negative [ ] anemia [ ] bleeding problem  Allergic/Immunologic: [ ] negative [ ] itchy eyes [ ] nasal discharge [ ] hives [ ] angioedema  [ ] All other systems negative  [ ] Unable to assess ROS because ________    OBJECTIVE:  ICU Vital Signs Last 24 Hrs  T(C): 36.6 (09 Dec 2022 05:59), Max: 37.5 (08 Dec 2022 21:44)  T(F): 97.9 (09 Dec 2022 05:59), Max: 99.5 (08 Dec 2022 21:44)  HR: 80 (09 Dec 2022 05:59) (80 - 98)  BP: 135/75 (09 Dec 2022 05:59) (127/71 - 181/81)  BP(mean): --  ABP: --  ABP(mean): --  RR: 17 (09 Dec 2022 05:59) (17 - 30)  SpO2: 98% (09 Dec 2022 05:59) (98% - 100%)    O2 Parameters below as of 09 Dec 2022 05:59  Patient On (Oxygen Delivery Method): nasal cannula  O2 Flow (L/min): 4            12-08 @ 07:01  -  12-09 @ 07:00  --------------------------------------------------------  IN: 0 mL / OUT: 200 mL / NET: -200 mL      CAPILLARY BLOOD GLUCOSE      POCT Blood Glucose.: 178 mg/dL (08 Dec 2022 21:56)      PHYSICAL EXAM:  General:   HEENT:   Lymph Nodes:  Neck:   Respiratory:   Cardiovascular:   Abdomen:   Extremities:   Skin:   Neurological:  Psychiatry:    HOSPITAL MEDICATIONS:    piperacillin/tazobactam IVPB.. 3.375 Gram(s) IV Intermittent every 8 hours  vancomycin  IVPB 1000 milliGRAM(s) IV Intermittent every 12 hours    amLODIPine   Tablet 5 milliGRAM(s) Oral daily    atorvastatin 20 milliGRAM(s) Oral at bedtime  dextrose 50% Injectable 25 Gram(s) IV Push once  dextrose 50% Injectable 12.5 Gram(s) IV Push once  dextrose 50% Injectable 25 Gram(s) IV Push once  dextrose Oral Gel 15 Gram(s) Oral once PRN  glucagon  Injectable 1 milliGRAM(s) IntraMuscular once  insulin lispro (ADMELOG) corrective regimen sliding scale   SubCutaneous three times a day before meals  insulin lispro (ADMELOG) corrective regimen sliding scale   SubCutaneous at bedtime      acetaminophen     Tablet .. 650 milliGRAM(s) Oral every 6 hours PRN          dextrose 5%. 1000 milliLiter(s) IV Continuous <Continuous>  dextrose 5%. 1000 milliLiter(s) IV Continuous <Continuous>    influenza  Vaccine (HIGH DOSE) 0.7 milliLiter(s) IntraMuscular once          LABS:                        12.8   8.16  )-----------( 210      ( 08 Dec 2022 06:34 )             38.9     Hgb Trend: 12.8<--, 13.0<--  12-08    138  |  106  |  19  ----------------------------<  124<H>  4.3   |  22  |  1.17    Ca    9.4      08 Dec 2022 06:34  Phos  3.2     12-08  Mg     1.60     12-08    TPro  6.6  /  Alb  3.8  /  TBili  0.9  /  DBili  x   /  AST  19  /  ALT  9   /  AlkPhos  78  12-08    Creatinine Trend: 1.17<--, 1.13<--  PT/INR - ( 08 Dec 2022 06:34 )   PT: 14.0 sec;   INR: 1.20 ratio         PTT - ( 08 Dec 2022 06:34 )  PTT:54.1 sec          MICROBIOLOGY:     RADIOLOGY:  [ ] Reviewed and interpreted by me    PULMONARY FUNCTION TESTS:    EKG: CHIEF COMPLAINT: sob    Interval Events: Patient seen and examined at bedside. No acute events overnight, He reports he feels well this morning.     REVIEW OF SYSTEMS:  Constitutional: [X ] negative [ ] fevers [ ] chills [ ] weight loss [ ] weight gain  HEENT: [ X] negative [ ] dry eyes [ ] eye irritation [ ] postnasal drip [ ] nasal congestion  CV: [ X] negative  [ ] chest pain [ ] orthopnea [ ] palpitations [ ] murmur  Resp: [ X] negative [ ] cough [ ] shortness of breath [ ] dyspnea [ ] wheezing [ ] sputum [ ] hemoptysis  GI: [ X] negative [ ] nausea [ ] vomiting [ ] diarrhea [ ] constipation [ ] abd pain [ ] dysphagia   : [X ] negative [ ] dysuria [ ] nocturia [ ] hematuria [ ] increased urinary frequency  Musculoskeletal: [X ] negative [ ] back pain [ ] myalgias [ ] arthralgias [ ] fracture  Skin: [ ] negative [ ] rash [ ] itch  Neurological: [ ] negative [ ] headache [ ] dizziness [ ] syncope [ ] weakness [ ] numbness  Psychiatric: [ ] negative [ ] anxiety [ ] depression  Endocrine: [ ] negative [ ] diabetes [ ] thyroid problem  Hematologic/Lymphatic: [ ] negative [ ] anemia [ ] bleeding problem  Allergic/Immunologic: [ ] negative [ ] itchy eyes [ ] nasal discharge [ ] hives [ ] angioedema  [ ] All other systems negative  [ ] Unable to assess ROS because ________    OBJECTIVE:  ICU Vital Signs Last 24 Hrs  T(C): 36.6 (09 Dec 2022 05:59), Max: 37.5 (08 Dec 2022 21:44)  T(F): 97.9 (09 Dec 2022 05:59), Max: 99.5 (08 Dec 2022 21:44)  HR: 80 (09 Dec 2022 05:59) (80 - 98)  BP: 135/75 (09 Dec 2022 05:59) (127/71 - 181/81)  BP(mean): --  ABP: --  ABP(mean): --  RR: 17 (09 Dec 2022 05:59) (17 - 30)  SpO2: 98% (09 Dec 2022 05:59) (98% - 100%)    O2 Parameters below as of 09 Dec 2022 05:59  Patient On (Oxygen Delivery Method): nasal cannula  O2 Flow (L/min): 4            12-08 @ 07:01  -  12-09 @ 07:00  --------------------------------------------------------  IN: 0 mL / OUT: 200 mL / NET: -200 mL      CAPILLARY BLOOD GLUCOSE      POCT Blood Glucose.: 178 mg/dL (08 Dec 2022 21:56)      PHYSICAL EXAM:  General:  well appearing, tachypneic, NAD  HEENT: no icterus  Neck:  supple  Respiratory: improved aeration of left lung field  Cardiovascular: S1/S2, RRR  Abdomen: soft, nontender  Extremities: no LE edema  Skin: no rashes  Neurological: AxOx3  Psychiatry: normal mood and affect      HOSPITAL MEDICATIONS:    piperacillin/tazobactam IVPB.. 3.375 Gram(s) IV Intermittent every 8 hours  vancomycin  IVPB 1000 milliGRAM(s) IV Intermittent every 12 hours    amLODIPine   Tablet 5 milliGRAM(s) Oral daily    atorvastatin 20 milliGRAM(s) Oral at bedtime  dextrose 50% Injectable 25 Gram(s) IV Push once  dextrose 50% Injectable 12.5 Gram(s) IV Push once  dextrose 50% Injectable 25 Gram(s) IV Push once  dextrose Oral Gel 15 Gram(s) Oral once PRN  glucagon  Injectable 1 milliGRAM(s) IntraMuscular once  insulin lispro (ADMELOG) corrective regimen sliding scale   SubCutaneous three times a day before meals  insulin lispro (ADMELOG) corrective regimen sliding scale   SubCutaneous at bedtime      acetaminophen     Tablet .. 650 milliGRAM(s) Oral every 6 hours PRN          dextrose 5%. 1000 milliLiter(s) IV Continuous <Continuous>  dextrose 5%. 1000 milliLiter(s) IV Continuous <Continuous>    influenza  Vaccine (HIGH DOSE) 0.7 milliLiter(s) IntraMuscular once          LABS:                        12.8   8.16  )-----------( 210      ( 08 Dec 2022 06:34 )             38.9     Hgb Trend: 12.8<--, 13.0<--  12-08    138  |  106  |  19  ----------------------------<  124<H>  4.3   |  22  |  1.17    Ca    9.4      08 Dec 2022 06:34  Phos  3.2     12-08  Mg     1.60     12-08    TPro  6.6  /  Alb  3.8  /  TBili  0.9  /  DBili  x   /  AST  19  /  ALT  9   /  AlkPhos  78  12-08    Creatinine Trend: 1.17<--, 1.13<--  PT/INR - ( 08 Dec 2022 06:34 )   PT: 14.0 sec;   INR: 1.20 ratio         PTT - ( 08 Dec 2022 06:34 )  PTT:54.1 sec          MICROBIOLOGY:     RADIOLOGY:  [ ] Reviewed and interpreted by me    PULMONARY FUNCTION TESTS:    EKG:

## 2022-12-09 NOTE — PHYSICAL THERAPY INITIAL EVALUATION ADULT - ADDITIONAL COMMENTS
Pt lives in a private split-level house with Son, +5 steps to enter, +5 steps inside to negotiate. Pt was independent with all functional mobility using a Single Axis Cane and can performance ADLs independently.     Pt left semi-supine in bed, all lines intact, all needs in reach, bed alarm set, in NAD. YESSY combs

## 2022-12-09 NOTE — PROVIDER CONTACT NOTE (OTHER) - REASON
Pt refusing abx. R used  to explain benefits and side effects of meds. Pt still refuse Pt refusing abx. R used  to explain benefits and side effects of meds. Pt still refuse. As per Pt " I do not believe in these type of medicine."

## 2022-12-09 NOTE — PHYSICAL THERAPY INITIAL EVALUATION ADULT - PERTINENT HX OF CURRENT PROBLEM, REHAB EVAL
Pt is a 82 year old Male with PMH of type 2 DM, HTN, HLD, prior dengue infection (2019) who presents to ED with shortness of breath. Found to have large left sided pleural effusion.

## 2022-12-09 NOTE — PROGRESS NOTE ADULT - ASSESSMENT
Pt is a 81yo M with PMH of type 2 DM, HTN, HLD, prior dengue infection (2019) who presents to ED with shortness of breath. Found to have large left sided pleural effusion. Admitted for further management.

## 2022-12-09 NOTE — PROGRESS NOTE ADULT - ATTENDING COMMENTS
82M with PMH of DMII, type 2 DM, HTN, HLD, prior dengue infection (2019), and remote smoking history, presents to the ER with 3 weeks of progressive shortness of breath found to have large left sided pleural effusion. Pulmonary consulted for further management.    Large pleural effusion on the left. Now s/p thoracentesis. CT scan confirmed US findings of a left upper lobe mass.     Please get CT abd/Pelvis with IV contrast to determine if there is any extrapulmonary metastatics disease that can be biopsied for molecular markers. Otherwise, there is a high pretest probability that the cytology will be positive for malignant cells which would be stage IV lung ca.     Please note that the patient will not be seen over the weekend. Please call the oncall pulmonary fellow if any issues or questions arise. The patient will likely be seen again on Monday.

## 2022-12-10 NOTE — PROGRESS NOTE ADULT - PROBLEM SELECTOR PLAN 1
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

## 2022-12-10 NOTE — PROGRESS NOTE ADULT - ATTENDING COMMENTS
patient with above history. admit for management of acute hypoxic resp failure iso left pleural effusion and lung mass, concern for malignancy. culture negative thus far, d/c abx. f/u cytology. off O2 now. ctm resp. CTAP for malignancy eval. echo pending. discussed with son at bedside.

## 2022-12-10 NOTE — PROGRESS NOTE ADULT - PROBLEM SELECTOR PLAN 3
- patient desating on room air, oxygenating well on 2LNC  - treat pleural effusion as above  - wean O2 as tolerated

## 2022-12-10 NOTE — PROGRESS NOTE ADULT - PROBLEM SELECTOR PLAN 2
- large left sided pleural effusion seen on CT, likely traumatic in setting of recent fall  - pulm consulted, recs pending  - will send pleural fluid studies (including cytology) to better characterized pleural effusion  - continue supplemental O2 as needed, can up-titrate to bipap if needed  - continuous pulse ox  - empiric vanc/zosyn for concern of empyema-> D/C after thoracentesis if pleural fluid findings are not concerning for infection  - obtain TTE to r/o cardiac origin (less likely cardiac origin given unilateral, BNP WNL, patient does not appear hypervolemic on exam)

## 2022-12-10 NOTE — PROGRESS NOTE ADULT - SUBJECTIVE AND OBJECTIVE BOX
PROGRESS NOTE:   Authored by Rojas Brown MD  Pager:  [phone]    Patient is a 82y old  Male who presents with a chief complaint of shortness of breath (09 Dec 2022 07:51)      SUBJECTIVE / OVERNIGHT EVENTS:    ADDITIONAL REVIEW OF SYSTEMS:    MEDICATIONS  (STANDING):  amLODIPine   Tablet 5 milliGRAM(s) Oral daily  atorvastatin 20 milliGRAM(s) Oral at bedtime  dextrose 5%. 1000 milliLiter(s) (50 mL/Hr) IV Continuous <Continuous>  dextrose 5%. 1000 milliLiter(s) (100 mL/Hr) IV Continuous <Continuous>  dextrose 50% Injectable 25 Gram(s) IV Push once  dextrose 50% Injectable 12.5 Gram(s) IV Push once  dextrose 50% Injectable 25 Gram(s) IV Push once  enoxaparin Injectable 40 milliGRAM(s) SubCutaneous every 24 hours  glucagon  Injectable 1 milliGRAM(s) IntraMuscular once  influenza  Vaccine (HIGH DOSE) 0.7 milliLiter(s) IntraMuscular once  insulin lispro (ADMELOG) corrective regimen sliding scale   SubCutaneous three times a day before meals  insulin lispro (ADMELOG) corrective regimen sliding scale   SubCutaneous at bedtime    MEDICATIONS  (PRN):  acetaminophen     Tablet .. 650 milliGRAM(s) Oral every 6 hours PRN Mild Pain (1 - 3), Moderate Pain (4 - 6)  dextrose Oral Gel 15 Gram(s) Oral once PRN Blood Glucose LESS THAN 70 milliGRAM(s)/deciliter      CAPILLARY BLOOD GLUCOSE      POCT Blood Glucose.: 120 mg/dL (10 Dec 2022 09:03)  POCT Blood Glucose.: 201 mg/dL (09 Dec 2022 22:08)  POCT Blood Glucose.: 267 mg/dL (09 Dec 2022 18:25)  POCT Blood Glucose.: 256 mg/dL (09 Dec 2022 13:32)    I&O's Summary      PHYSICAL EXAM:  Vital Signs Last 24 Hrs  T(C): 36.7 (10 Dec 2022 06:11), Max: 36.7 (09 Dec 2022 15:25)  T(F): 98 (10 Dec 2022 06:11), Max: 98.1 (09 Dec 2022 15:25)  HR: 89 (10 Dec 2022 06:11) (78 - 89)  BP: 146/71 (10 Dec 2022 06:11) (132/74 - 146/71)  BP(mean): --  RR: 19 (10 Dec 2022 06:11) (17 - 19)  SpO2: 95% (10 Dec 2022 06:11) (95% - 100%)    Parameters below as of 10 Dec 2022 06:11  Patient On (Oxygen Delivery Method): nasal cannula  O2 Flow (L/min): 3      GENERAL: No acute distress, well-developed  HEAD:  Atraumatic, Normocephalic  EYES: EOMI, PERRLA, conjunctiva and sclera clear  NECK: Supple, no lymphadenopathy, no JVD  CHEST/LUNG: CTAB; No wheezes, rales, or rhonchi  HEART: Regular rate and rhythm; No murmurs, rubs, or gallops  ABDOMEN: Soft, non-tender, non-distended; normal bowel sounds, no organomegaly  EXTREMITIES:  2+ peripheral pulses b/l, No clubbing, cyanosis, or edema  NEUROLOGY: A&O x 3, no focal deficits  SKIN: No rashes or lesions    LABS:                        13.6   8.84  )-----------( 193      ( 10 Dec 2022 05:50 )             39.4     12-10    133<L>  |  102  |  15  ----------------------------<  113<H>  4.1   |  20<L>  |  1.04    Ca    8.7      10 Dec 2022 05:50  Phos  2.7     12-10  Mg     1.80     12-10    TPro  6.1  /  Alb  3.2<L>  /  TBili  0.8  /  DBili  x   /  AST  14  /  ALT  5   /  AlkPhos  74  12-10              Culture - Fungal, Body Fluid (collected 08 Dec 2022 15:00)  Source: Pleural Fl Pleural Fluid  Preliminary Report (09 Dec 2022 07:39):    Testing in progress    Culture - Body Fluid with Gram Stain (collected 08 Dec 2022 15:00)  Source: Pleural Fl Pleural Fluid  Gram Stain (08 Dec 2022 20:08):    polymorphonuclear leukocytes seen    No organisms seen    by cytocentrifuge  Preliminary Report (09 Dec 2022 11:40):    No growth    Culture - Blood (collected 08 Dec 2022 03:56)  Source: .Blood Blood-Peripheral  Preliminary Report (09 Dec 2022 07:03):    No growth to date.    Culture - Blood (collected 08 Dec 2022 03:48)  Source: .Blood Blood-Peripheral  Preliminary Report (09 Dec 2022 07:03):    No growth to date.        RADIOLOGY & ADDITIONAL TESTS:  Results Reviewed:   Imaging Personally Reviewed:  Electrocardiogram Personally Reviewed:    COORDINATION OF CARE:  Care Discussed with Consultants/Other Providers [Y/N]:  Prior or Outpatient Records Reviewed [Y/N]:   PROGRESS NOTE:   Authored by Rojas Brown MD  Pager:  [phone]    Patient is a 82y old  Male who presents with a chief complaint of shortness of breath (09 Dec 2022 07:51)      SUBJECTIVE / OVERNIGHT EVENTS: NAEO. Patient feels well today. Endorses improvement breathing    ADDITIONAL REVIEW OF SYSTEMS:  REVIEW OF SYSTEMS:    CONSTITUTIONAL: No weakness, fevers or chills  RESPIRATORY: No cough, wheezing, hemoptysis; No shortness of breath  CARDIOVASCULAR: No chest pain or palpitations  GASTROINTESTINAL: No abdominal or epigastric pain. No nausea, vomiting, or hematemesis; No diarrhea or constipation. No melena or hematochezia.  SKIN: No itching, rashes      MEDICATIONS  (STANDING):  amLODIPine   Tablet 5 milliGRAM(s) Oral daily  atorvastatin 20 milliGRAM(s) Oral at bedtime  dextrose 5%. 1000 milliLiter(s) (50 mL/Hr) IV Continuous <Continuous>  dextrose 5%. 1000 milliLiter(s) (100 mL/Hr) IV Continuous <Continuous>  dextrose 50% Injectable 25 Gram(s) IV Push once  dextrose 50% Injectable 12.5 Gram(s) IV Push once  dextrose 50% Injectable 25 Gram(s) IV Push once  enoxaparin Injectable 40 milliGRAM(s) SubCutaneous every 24 hours  glucagon  Injectable 1 milliGRAM(s) IntraMuscular once  influenza  Vaccine (HIGH DOSE) 0.7 milliLiter(s) IntraMuscular once  insulin lispro (ADMELOG) corrective regimen sliding scale   SubCutaneous three times a day before meals  insulin lispro (ADMELOG) corrective regimen sliding scale   SubCutaneous at bedtime    MEDICATIONS  (PRN):  acetaminophen     Tablet .. 650 milliGRAM(s) Oral every 6 hours PRN Mild Pain (1 - 3), Moderate Pain (4 - 6)  dextrose Oral Gel 15 Gram(s) Oral once PRN Blood Glucose LESS THAN 70 milliGRAM(s)/deciliter      CAPILLARY BLOOD GLUCOSE      POCT Blood Glucose.: 120 mg/dL (10 Dec 2022 09:03)  POCT Blood Glucose.: 201 mg/dL (09 Dec 2022 22:08)  POCT Blood Glucose.: 267 mg/dL (09 Dec 2022 18:25)  POCT Blood Glucose.: 256 mg/dL (09 Dec 2022 13:32)    I&O's Summary      PHYSICAL EXAM:  Vital Signs Last 24 Hrs  T(C): 36.7 (10 Dec 2022 06:11), Max: 36.7 (09 Dec 2022 15:25)  T(F): 98 (10 Dec 2022 06:11), Max: 98.1 (09 Dec 2022 15:25)  HR: 89 (10 Dec 2022 06:11) (78 - 89)  BP: 146/71 (10 Dec 2022 06:11) (132/74 - 146/71)  BP(mean): --  RR: 19 (10 Dec 2022 06:11) (17 - 19)  SpO2: 95% (10 Dec 2022 06:11) (95% - 100%)    Parameters below as of 10 Dec 2022 06:11  Patient On (Oxygen Delivery Method): nasal cannula  O2 Flow (L/min): 3      GENERAL: No acute distress, well-developed  HEAD:  Atraumatic, Normocephalic  EYES: EOMI, PERRLA, conjunctiva and sclera clear  NECK: Supple, no lymphadenopathy, no JVD  CHEST/LUNG: CTAB; No wheezes, rales, or rhonchi  HEART: Regular rate and rhythm; No murmurs, rubs, or gallops  ABDOMEN: Soft, non-tender, non-distended; normal bowel sounds, no organomegaly  EXTREMITIES:  2+ peripheral pulses b/l, No clubbing, cyanosis, or edema  NEUROLOGY: A&O x 3, no focal deficits  SKIN: No rashes or lesions    LABS:                        13.6   8.84  )-----------( 193      ( 10 Dec 2022 05:50 )             39.4     12-10    133<L>  |  102  |  15  ----------------------------<  113<H>  4.1   |  20<L>  |  1.04    Ca    8.7      10 Dec 2022 05:50  Phos  2.7     12-10  Mg     1.80     12-10    TPro  6.1  /  Alb  3.2<L>  /  TBili  0.8  /  DBili  x   /  AST  14  /  ALT  5   /  AlkPhos  74  12-10              Culture - Fungal, Body Fluid (collected 08 Dec 2022 15:00)  Source: Pleural Fl Pleural Fluid  Preliminary Report (09 Dec 2022 07:39):    Testing in progress    Culture - Body Fluid with Gram Stain (collected 08 Dec 2022 15:00)  Source: Pleural Fl Pleural Fluid  Gram Stain (08 Dec 2022 20:08):    polymorphonuclear leukocytes seen    No organisms seen    by cytocentrifuge  Preliminary Report (09 Dec 2022 11:40):    No growth    Culture - Blood (collected 08 Dec 2022 03:56)  Source: .Blood Blood-Peripheral  Preliminary Report (09 Dec 2022 07:03):    No growth to date.    Culture - Blood (collected 08 Dec 2022 03:48)  Source: .Blood Blood-Peripheral  Preliminary Report (09 Dec 2022 07:03):    No growth to date.        RADIOLOGY & ADDITIONAL TESTS:  Results Reviewed:   Imaging Personally Reviewed:  Electrocardiogram Personally Reviewed:    COORDINATION OF CARE:  Care Discussed with Consultants/Other Providers [Y/N]:  Prior or Outpatient Records Reviewed [Y/N]:

## 2022-12-10 NOTE — PROGRESS NOTE ADULT - PROBLEM SELECTOR PROBLEM 3
Acute respiratory failure with hypoxia Return to ER immediately if your chest pain returns, if you have pain with radiation to arms, back or neck, if you feel short of breath, feel weak, feel fast or pounding heart beating, if you have any fever or vomiting.  If you need assistance with follow up appointments our Care Coordinator can be reached at [phone]. In addition [facility] is located at [address] [place] [address], tel: [phone].   Do not use illicit drugs.

## 2022-12-11 NOTE — PROGRESS NOTE ADULT - ATTENDING COMMENTS
82M with h/o DM2 presented with SOB found to have left upper lung mass and pleural effusion. s/p thoracentesis. resp improved. echo unremarkable. pending CT a/p and cytology from fluid.

## 2022-12-11 NOTE — PROGRESS NOTE ADULT - SUBJECTIVE AND OBJECTIVE BOX
Todd Abreu MD  PGY 1 Department of Internal Medicine        Patient is a 82y old  Male who presents with a chief complaint of shortness of breath (10 Dec 2022 11:35)      SUBJECTIVE / OVERNIGHT EVENTS: Pt seen and examined. No acute overnight events. Denies fevers, chills, CP, SOB, Abdominal pain, N/V, Constipation, Diarrhea        MEDICATIONS  (STANDING):  amLODIPine   Tablet 5 milliGRAM(s) Oral daily  atorvastatin 20 milliGRAM(s) Oral at bedtime  dextrose 5%. 1000 milliLiter(s) (50 mL/Hr) IV Continuous <Continuous>  dextrose 5%. 1000 milliLiter(s) (100 mL/Hr) IV Continuous <Continuous>  dextrose 50% Injectable 25 Gram(s) IV Push once  dextrose 50% Injectable 12.5 Gram(s) IV Push once  dextrose 50% Injectable 25 Gram(s) IV Push once  enoxaparin Injectable 40 milliGRAM(s) SubCutaneous every 24 hours  glucagon  Injectable 1 milliGRAM(s) IntraMuscular once  influenza  Vaccine (HIGH DOSE) 0.7 milliLiter(s) IntraMuscular once  insulin lispro (ADMELOG) corrective regimen sliding scale   SubCutaneous three times a day before meals  insulin lispro (ADMELOG) corrective regimen sliding scale   SubCutaneous at bedtime    MEDICATIONS  (PRN):  acetaminophen     Tablet .. 650 milliGRAM(s) Oral every 6 hours PRN Mild Pain (1 - 3), Moderate Pain (4 - 6)  dextrose Oral Gel 15 Gram(s) Oral once PRN Blood Glucose LESS THAN 70 milliGRAM(s)/deciliter      I&O's Summary      Vital Signs Last 24 Hrs  T(C): 37.1 (11 Dec 2022 06:49), Max: 37.1 (11 Dec 2022 06:49)  T(F): 98.7 (11 Dec 2022 06:49), Max: 98.7 (11 Dec 2022 06:49)  HR: 79 (11 Dec 2022 06:49) (79 - 93)  BP: 146/63 (11 Dec 2022 06:49) (134/69 - 157/67)  BP(mean): --  RR: 17 (11 Dec 2022 06:49) (17 - 18)  SpO2: 95% (11 Dec 2022 06:49) (95% - 99%)    Parameters below as of 11 Dec 2022 06:49  Patient On (Oxygen Delivery Method): nasal cannula  O2 Flow (L/min): 3      CAPILLARY BLOOD GLUCOSE      POCT Blood Glucose.: 187 mg/dL (10 Dec 2022 21:50)  POCT Blood Glucose.: 284 mg/dL (10 Dec 2022 18:02)  POCT Blood Glucose.: 226 mg/dL (10 Dec 2022 12:22)  POCT Blood Glucose.: 120 mg/dL (10 Dec 2022 09:03)      PHYSICAL EXAM:  GENERAL: NAD,   HEAD:  Atraumatic, Normocephalic  EYES: EOMI, PERRL, conjunctiva and sclera clear  NECK: No JVD  CHEST/LUNG: Clear to auscultation bilaterally; No wheeze  HEART: Regular rate and rhythm; No murmurs, rubs, or gallops  ABDOMEN: Soft, Nontender, Nondistended; Bowel sounds present  EXTREMITIES:  2+ Peripheral Pulses, No clubbing, cyanosis, or edema  PSYCH: AAOx3  NEUROLOGY: non-focal  SKIN: No rashes or lesions       LABS:                        13.2   7.76  )-----------( 179      ( 11 Dec 2022 05:49 )             39.1     Auto Eosinophil # x     / Auto Eosinophil % x     / Auto Neutrophil # x     / Auto Neutrophil % x     / BANDS % x                            13.6   8.84  )-----------( 193      ( 10 Dec 2022 05:50 )             39.4     Auto Eosinophil # x     / Auto Eosinophil % x     / Auto Neutrophil # x     / Auto Neutrophil % x     / BANDS % x        12-11    134<L>  |  102  |  15  ----------------------------<  135<H>  4.1   |  20<L>  |  0.92  12-10    133<L>  |  102  |  15  ----------------------------<  113<H>  4.1   |  20<L>  |  1.04    Ca    8.6      11 Dec 2022 05:49  Mg     1.80     12-11  Phos  2.0     12-11  TPro  6.0  /  Alb  3.1<L>  /  TBili  0.6  /  DBili  x   /  AST  17  /  ALT  11  /  AlkPhos  70  12-11  TPro  6.1  /  Alb  3.2<L>  /  TBili  0.8  /  DBili  x   /  AST  14  /  ALT  5   /  AlkPhos  74  12-10                  RADIOLOGY & ADDITIONAL TESTS:    Imaging Personally Reviewed: CT chest    Consultant(s) Notes Reviewed:      Care Discussed with Consultants/Other Providers:

## 2022-12-11 NOTE — PROGRESS NOTE ADULT - PROBLEM SELECTOR PLAN 2
- large left sided pleural effusion seen on CT, likely traumatic in setting of recent fall  - pulm consulted, recs pending  - will send pleural fluid studies (including cytology) to better characterized pleural effusion  - continue supplemental O2 as needed, can up-titrate to bipap if needed  - continuous pulse ox  - empiric vanc/zosyn for concern of empyema-> D/C after thoracentesis if pleural fluid findings are not concerning for infection  - obtain TTE to r/o cardiac origin (less likely cardiac origin given unilateral, BNP WNL, patient does not appear hypervolemic on exam) - large left sided pleural effusion seen on CT, likely traumatic in setting of recent fall  - pulm consulted, recs pending  - will send pleural fluid studies (including cytology) to better characterized pleural effusion  - continue supplemental O2 as needed, can up-titrate to bipap if needed  - continuous pulse ox  - empiric vanc/zosyn for concern of empyema-> D/C after thoracentesis if pleural fluid findings are not concerning for infection  - TTE completed, largely unremarkable aside from mild pulmonary hypertension

## 2022-12-11 NOTE — PROGRESS NOTE ADULT - PROBLEM SELECTOR PLAN 3
- patient desating on room air, oxygenating well on 2LNC  - treat pleural effusion as above  - wean O2 as tolerated Pt oxygenating well on RA following treatment of pleural effusion  - pleural effusion treated as above, tachypnea and hypoxia resolved at this time

## 2022-12-11 NOTE — PROGRESS NOTE ADULT - PROBLEM SELECTOR PLAN 1
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

## 2022-12-12 NOTE — PROGRESS NOTE ADULT - PROBLEM SELECTOR PLAN 3
Pt oxygenating well on RA following treatment of pleural effusion  - pleural effusion treated as above, tachypnea and hypoxia resolved at this time

## 2022-12-12 NOTE — PROGRESS NOTE ADULT - SUBJECTIVE AND OBJECTIVE BOX
Todd Abreu MD  PGY 1 Department of Internal Medicine        Patient is a 82y old  Male who presents with a chief complaint of shortness of breath (11 Dec 2022 07:34)      SUBJECTIVE / OVERNIGHT EVENTS: Pt seen and examined. No acute overnight events. Denies fevers, chills, CP, SOB, Abdominal pain, N/V, Constipation, Diarrhea        MEDICATIONS  (STANDING):  amLODIPine   Tablet 5 milliGRAM(s) Oral daily  atorvastatin 20 milliGRAM(s) Oral at bedtime  dextrose 5%. 1000 milliLiter(s) (50 mL/Hr) IV Continuous <Continuous>  dextrose 5%. 1000 milliLiter(s) (100 mL/Hr) IV Continuous <Continuous>  dextrose 50% Injectable 25 Gram(s) IV Push once  dextrose 50% Injectable 12.5 Gram(s) IV Push once  dextrose 50% Injectable 25 Gram(s) IV Push once  enoxaparin Injectable 40 milliGRAM(s) SubCutaneous every 24 hours  glucagon  Injectable 1 milliGRAM(s) IntraMuscular once  influenza  Vaccine (HIGH DOSE) 0.7 milliLiter(s) IntraMuscular once  insulin glargine Injectable (LANTUS) 3 Unit(s) SubCutaneous at bedtime  insulin lispro (ADMELOG) corrective regimen sliding scale   SubCutaneous three times a day before meals  insulin lispro (ADMELOG) corrective regimen sliding scale   SubCutaneous at bedtime  melatonin 3 milliGRAM(s) Oral at bedtime  senna 2 Tablet(s) Oral at bedtime    MEDICATIONS  (PRN):  acetaminophen     Tablet .. 650 milliGRAM(s) Oral every 6 hours PRN Mild Pain (1 - 3), Moderate Pain (4 - 6)  dextrose Oral Gel 15 Gram(s) Oral once PRN Blood Glucose LESS THAN 70 milliGRAM(s)/deciliter  polyethylene glycol 3350 17 Gram(s) Oral daily PRN Constipation      I&O's Summary    11 Dec 2022 07:01  -  12 Dec 2022 07:00  --------------------------------------------------------  IN: 0 mL / OUT: 500 mL / NET: -500 mL        Vital Signs Last 24 Hrs  T(C): 37 (11 Dec 2022 21:23), Max: 37.1 (11 Dec 2022 14:31)  T(F): 98.6 (11 Dec 2022 21:23), Max: 98.8 (11 Dec 2022 14:31)  HR: 91 (11 Dec 2022 21:23) (87 - 91)  BP: 156/78 (11 Dec 2022 21:23) (156/70 - 156/78)  BP(mean): --  RR: 18 (11 Dec 2022 21:23) (18 - 18)  SpO2: 98% (11 Dec 2022 21:23) (93% - 98%)    Parameters below as of 11 Dec 2022 21:23  Patient On (Oxygen Delivery Method): nasal cannula  O2 Flow (L/min): 3      CAPILLARY BLOOD GLUCOSE      POCT Blood Glucose.: 230 mg/dL (11 Dec 2022 22:17)  POCT Blood Glucose.: 287 mg/dL (11 Dec 2022 17:45)  POCT Blood Glucose.: 270 mg/dL (11 Dec 2022 12:23)  POCT Blood Glucose.: 212 mg/dL (11 Dec 2022 10:04)  POCT Blood Glucose.: 155 mg/dL (11 Dec 2022 08:30)      PHYSICAL EXAM:  GENERAL: NAD,   HEAD:  Atraumatic, Normocephalic  EYES: EOMI, PERRL, conjunctiva and sclera clear  NECK: No JVD  CHEST/LUNG: Clear to auscultation bilaterally; No wheeze  HEART: Regular rate and rhythm; No murmurs, rubs, or gallops  ABDOMEN: Soft, Nontender, Nondistended; Bowel sounds present  EXTREMITIES:  2+ Peripheral Pulses, No clubbing, cyanosis, or edema  PSYCH: AAOx3  NEUROLOGY: non-focal  SKIN: No rashes or lesions       LABS:                        13.2   7.76  )-----------( 179      ( 11 Dec 2022 05:49 )             39.1     Auto Eosinophil # x     / Auto Eosinophil % x     / Auto Neutrophil # x     / Auto Neutrophil % x     / BANDS % x        12-11    134<L>  |  102  |  15  ----------------------------<  135<H>  4.1   |  20<L>  |  0.92    Ca    8.6      11 Dec 2022 05:49  Mg     1.80     12-11  Phos  2.0     12-11  TPro  6.0  /  Alb  3.1<L>  /  TBili  0.6  /  DBili  x   /  AST  17  /  ALT  11  /  AlkPhos  70  12-11                  RADIOLOGY & ADDITIONAL TESTS:    Imaging Personally Reviewed:    Consultant(s) Notes Reviewed:      Care Discussed with Consultants/Other Providers:   Todd Abreu MD  PGY 1 Department of Internal Medicine        Patient is a 82y old  Male who presents with a chief complaint of shortness of breath (11 Dec 2022 07:34)      SUBJECTIVE / OVERNIGHT EVENTS: Pt seen and examined. No acute overnight events. Denies fevers, chills, CP, SOB, Abdominal pain, N/V, Constipation, Diarrhea        MEDICATIONS  (STANDING):  amLODIPine   Tablet 5 milliGRAM(s) Oral daily  atorvastatin 20 milliGRAM(s) Oral at bedtime  dextrose 5%. 1000 milliLiter(s) (50 mL/Hr) IV Continuous <Continuous>  dextrose 5%. 1000 milliLiter(s) (100 mL/Hr) IV Continuous <Continuous>  dextrose 50% Injectable 25 Gram(s) IV Push once  dextrose 50% Injectable 12.5 Gram(s) IV Push once  dextrose 50% Injectable 25 Gram(s) IV Push once  enoxaparin Injectable 40 milliGRAM(s) SubCutaneous every 24 hours  glucagon  Injectable 1 milliGRAM(s) IntraMuscular once  influenza  Vaccine (HIGH DOSE) 0.7 milliLiter(s) IntraMuscular once  insulin glargine Injectable (LANTUS) 3 Unit(s) SubCutaneous at bedtime  insulin lispro (ADMELOG) corrective regimen sliding scale   SubCutaneous three times a day before meals  insulin lispro (ADMELOG) corrective regimen sliding scale   SubCutaneous at bedtime  melatonin 3 milliGRAM(s) Oral at bedtime  senna 2 Tablet(s) Oral at bedtime    MEDICATIONS  (PRN):  acetaminophen     Tablet .. 650 milliGRAM(s) Oral every 6 hours PRN Mild Pain (1 - 3), Moderate Pain (4 - 6)  dextrose Oral Gel 15 Gram(s) Oral once PRN Blood Glucose LESS THAN 70 milliGRAM(s)/deciliter  polyethylene glycol 3350 17 Gram(s) Oral daily PRN Constipation      I&O's Summary    11 Dec 2022 07:01  -  12 Dec 2022 07:00  --------------------------------------------------------  IN: 0 mL / OUT: 500 mL / NET: -500 mL        Vital Signs Last 24 Hrs  T(C): 37 (11 Dec 2022 21:23), Max: 37.1 (11 Dec 2022 14:31)  T(F): 98.6 (11 Dec 2022 21:23), Max: 98.8 (11 Dec 2022 14:31)  HR: 91 (11 Dec 2022 21:23) (87 - 91)  BP: 156/78 (11 Dec 2022 21:23) (156/70 - 156/78)  BP(mean): --  RR: 18 (11 Dec 2022 21:23) (18 - 18)  SpO2: 98% (11 Dec 2022 21:23) (93% - 98%)    Parameters below as of 11 Dec 2022 21:23  Patient On (Oxygen Delivery Method): nasal cannula  O2 Flow (L/min): 3      CAPILLARY BLOOD GLUCOSE      POCT Blood Glucose.: 230 mg/dL (11 Dec 2022 22:17)  POCT Blood Glucose.: 287 mg/dL (11 Dec 2022 17:45)  POCT Blood Glucose.: 270 mg/dL (11 Dec 2022 12:23)  POCT Blood Glucose.: 212 mg/dL (11 Dec 2022 10:04)  POCT Blood Glucose.: 155 mg/dL (11 Dec 2022 08:30)      PHYSICAL EXAM:  GENERAL: NAD,   HEAD:  Atraumatic, Normocephalic  EYES: EOMI, PERRL, conjunctiva and sclera clear  NECK: No JVD  CHEST/LUNG: Clear to auscultation bilaterally; No wheeze  HEART: Regular rate and rhythm; No murmurs, rubs, or gallops  ABDOMEN: Soft, Nontender, Nondistended; Bowel sounds present  EXTREMITIES:  2+ Peripheral Pulses, No clubbing, cyanosis, or edema  PSYCH: AAOx3  NEUROLOGY: non-focal  SKIN: No rashes or lesions       LABS:                        13.2   7.76  )-----------( 179      ( 11 Dec 2022 05:49 )             39.1     Auto Eosinophil # x     / Auto Eosinophil % x     / Auto Neutrophil # x     / Auto Neutrophil % x     / BANDS % x        12-11    134<L>  |  102  |  15  ----------------------------<  135<H>  4.1   |  20<L>  |  0.92    Ca    8.6      11 Dec 2022 05:49  Mg     1.80     12-11  Phos  2.0     12-11  TPro  6.0  /  Alb  3.1<L>  /  TBili  0.6  /  DBili  x   /  AST  17  /  ALT  11  /  AlkPhos  70  12-11                  RADIOLOGY & ADDITIONAL TESTS:    CT Abdomen and Pelvis w/ IV Cont (12.12.22 @ 10:38)  FINDINGS:  LOWER CHEST: Redemonstration of large left loculated effusion with   associated compressive atelectasis. Trace right basilar atelectasis.    LIVER: Subentimeter, right hepatic lobe hypodensity is redemonstrated,   likely hepatic cyst.  BILE DUCTS: Normal caliber.  GALLBLADDER: Contracted gallbladder with probable fundal adenomyomatosis.  SPLEEN: Within normal limits.  PANCREAS: Within normal limits.  ADRENALS: Within normal limits.  KIDNEYS/URETERS:  RIGHT: Indeterminate exophytic right midpole hypodense lesion, measuring   1.5 x 1.5 cm. Scattered right upper pole subcentimeter hypodensities too   small to characterize. Nonobstructive right upper pole renal calculi.  LEFT: Scattered left renal hypodensities too small to characterize.    BLADDER: Thickened wall consistent with chronic changes of bladder outlet   obstruction.  REPRODUCTIVE ORGANS: Prostate is enlarged.    BOWEL: No bowel obstruction. Appendix is not visualized. No evidence of   inflammation in the pericecal region. Colonic diverticulosis.  PERITONEUM: No ascites.  VESSELS: Atherosclerotic changes.  RETROPERITONEUM/LYMPH NODES: No lymphadenopathy.  ABDOMINAL WALL: Within normal limits.  BONES: Degenerative changes.    IMPRESSION:  Indeterminate left upper pole renal lesion measuring 1.5 cm possibly   hemorrhagic cyst. Consider CT scan or MRI with and without contrast for   definitive characterization.    No evidence of metastatic disease or suspicious adenopathy in the abdomen   and pelvis.    Redemonstration of large left loculated effusion with associated   compressive atelectasis.      Imaging Personally Reviewed: CT abdomen/pelvis reviewed by me.

## 2022-12-12 NOTE — PROGRESS NOTE ADULT - PROBLEM SELECTOR PLAN 2
- large left sided pleural effusion seen on CT, likely traumatic in setting of recent fall  - pulm consulted, recs pending  - will send pleural fluid studies (including cytology) to better characterized pleural effusion  - continue supplemental O2 as needed, can up-titrate to bipap if needed  - continuous pulse ox  - empiric vanc/zosyn for concern of empyema-> D/C after thoracentesis if pleural fluid findings are not concerning for infection  - TTE completed, largely unremarkable aside from mild pulmonary hypertension

## 2022-12-12 NOTE — PROGRESS NOTE ADULT - ATTENDING COMMENTS
Son Paul at bed side wants result of CT a/p Patient seen and examined with team  Agree with above a/p by Dr Abreu  Pt is a 83yo M with PMH of type 2 DM, HTN, HLD, prior dengue infection (2019) who presents to ED with shortness of breath. Found to have large left sided pleural effusion. Admitted for further management. Had L lung thoracentesis done on 12/8.Ct of chest done 12/9 to r/o mass  Patient seen at bedside with son Paul Mcdonnell [phone]   Pe nad  Vital Signs Last 24 HrsT(F): 98.7, HR: 79 , BP: 149/85 ,RR: 18 , SpO2: 96%  nasal cannulaO2 Flow (L/min): 3  Lungs dec BS to L lung, R lung clear, cor rrr, abd soft n/t, ext neg e/c/c              ra< from: CT Abdomen and Pelvis w/ IV Cont (12.12.22 @ 10:38) >  IMPRESSION:  Indeterminate left upper pole renal lesion measuring 1.5 cm possibly   hemorrhagic cyst. Consider CT scan or MRI with and without contrast for   definitive characterization.  No evidence of metastatic disease or suspicious adenopathy in the abdomen   and pelvis.  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 Pulmonary appreciated- pulmonary completed thoracentesis 12/8  f/u pleural effusion for culture and cytology. Lung mass- Bxp of Lung mass by Pulmonary- will ask for f/u  #Id c/w Iv zosyn and vanco. monitor vanco trough .  blood culture is negative 12/10, antibiotics stopped  # HTN /75 today-c/w  norvasc 5 mg qd- hold sbp <100  # dvt proph, Lov sq  Await pulm input now ct a/p is back

## 2022-12-12 NOTE — PROGRESS NOTE ADULT - PROBLEM SELECTOR PLAN 8
DVT ppx: hold chemical dvt ppx until after thoracentesis, SCDs for now  Diet: DASH halal  Dispo: admit to medicine for pleural effusion DVT ppx: hold chemical dvt ppx until after thoracentesis, SCDs for now  Diet: DASH halal  Dispo: pending clinical course

## 2022-12-13 NOTE — PROGRESS NOTE ADULT - SUBJECTIVE AND OBJECTIVE BOX
Todd Abreu MD  PGY 1 Department of Internal Medicine        Patient is a 82y old  Male who presents with a chief complaint of shortness of breath (12 Dec 2022 07:11)      SUBJECTIVE / OVERNIGHT EVENTS: Pt seen and examined. No acute overnight events. Denies fevers, chills, CP, SOB, Abdominal pain, N/V, Constipation, Diarrhea        MEDICATIONS  (STANDING):  amLODIPine   Tablet 5 milliGRAM(s) Oral daily  atorvastatin 20 milliGRAM(s) Oral at bedtime  dextrose 5%. 1000 milliLiter(s) (50 mL/Hr) IV Continuous <Continuous>  dextrose 5%. 1000 milliLiter(s) (100 mL/Hr) IV Continuous <Continuous>  dextrose 50% Injectable 25 Gram(s) IV Push once  dextrose 50% Injectable 12.5 Gram(s) IV Push once  dextrose 50% Injectable 25 Gram(s) IV Push once  enoxaparin Injectable 40 milliGRAM(s) SubCutaneous every 24 hours  glucagon  Injectable 1 milliGRAM(s) IntraMuscular once  influenza  Vaccine (HIGH DOSE) 0.7 milliLiter(s) IntraMuscular once  insulin glargine Injectable (LANTUS) 3 Unit(s) SubCutaneous at bedtime  insulin lispro (ADMELOG) corrective regimen sliding scale   SubCutaneous three times a day before meals  insulin lispro (ADMELOG) corrective regimen sliding scale   SubCutaneous at bedtime  melatonin 3 milliGRAM(s) Oral at bedtime  senna 2 Tablet(s) Oral at bedtime    MEDICATIONS  (PRN):  acetaminophen     Tablet .. 650 milliGRAM(s) Oral every 6 hours PRN Mild Pain (1 - 3), Moderate Pain (4 - 6)  dextrose Oral Gel 15 Gram(s) Oral once PRN Blood Glucose LESS THAN 70 milliGRAM(s)/deciliter  polyethylene glycol 3350 17 Gram(s) Oral daily PRN Constipation      I&O's Summary      Vital Signs Last 24 Hrs  T(C): 36.7 (13 Dec 2022 06:20), Max: 36.9 (12 Dec 2022 22:40)  T(F): 98 (13 Dec 2022 06:20), Max: 98.5 (12 Dec 2022 22:40)  HR: 71 (13 Dec 2022 06:20) (71 - 100)  BP: 153/78 (13 Dec 2022 06:20) (153/78 - 162/80)  BP(mean): --  RR: 18 (13 Dec 2022 06:20) (17 - 18)  SpO2: 96% (13 Dec 2022 06:20) (95% - 96%)    Parameters below as of 13 Dec 2022 06:20  Patient On (Oxygen Delivery Method): nasal cannula  O2 Flow (L/min): 3      CAPILLARY BLOOD GLUCOSE      POCT Blood Glucose.: 290 mg/dL (12 Dec 2022 22:17)  POCT Blood Glucose.: 314 mg/dL (12 Dec 2022 18:04)  POCT Blood Glucose.: 294 mg/dL (12 Dec 2022 12:11)  POCT Blood Glucose.: 182 mg/dL (12 Dec 2022 09:01)      PHYSICAL EXAM:  GENERAL: NAD,   HEAD:  Atraumatic, Normocephalic  EYES: EOMI, PERRL, conjunctiva and sclera clear  NECK: No JVD  CHEST/LUNG: Clear to auscultation bilaterally; No wheeze  HEART: Regular rate and rhythm; No murmurs, rubs, or gallops  ABDOMEN: Soft, Nontender, Nondistended; Bowel sounds present  EXTREMITIES:  2+ Peripheral Pulses, No clubbing, cyanosis, or edema  PSYCH: AAOx3  NEUROLOGY: non-focal  SKIN: No rashes or lesions      LABS:                        13.6   7.47  )-----------( 199      ( 12 Dec 2022 07:15 )             39.5     Auto Eosinophil # x     / Auto Eosinophil % x     / Auto Neutrophil # x     / Auto Neutrophil % x     / BANDS % x        12-12    136  |  102  |  17  ----------------------------<  162<H>  3.8   |  20<L>  |  0.93    Ca    8.8      12 Dec 2022 07:15  Mg     1.90     12-12  Phos  2.2     12-12                  RADIOLOGY & ADDITIONAL TESTS:    CT Abdomen and Pelvis w/ IV Cont (12.12.22 @ 10:38)    FINDINGS:  LOWER CHEST: Redemonstration of large left loculated effusion with   associated compressive atelectasis. Trace right basilar atelectasis.    LIVER: Subentimeter, right hepatic lobe hypodensity is redemonstrated,   likely hepatic cyst.  BILE DUCTS: Normal caliber.  GALLBLADDER: Contracted gallbladder with probable fundal adenomyomatosis.  SPLEEN: Within normal limits.  PANCREAS: Within normal limits.  ADRENALS: Within normal limits.  KIDNEYS/URETERS:  RIGHT: Indeterminate exophytic right midpole hypodense lesion, measuring   1.5 x 1.5 cm. Scattered right upper pole subcentimeter hypodensities too   small to characterize. Nonobstructive right upper pole renal calculi.  LEFT: Scattered left renal hypodensities too small to characterize.    BLADDER: Thickened wall consistent with chronic changes of bladder outlet   obstruction.  REPRODUCTIVE ORGANS: Prostate is enlarged.    BOWEL: No bowel obstruction. Appendix is not visualized. No evidence of   inflammation in the pericecal region. Colonic diverticulosis.  PERITONEUM: No ascites.  VESSELS: Atherosclerotic changes.  RETROPERITONEUM/LYMPH NODES: No lymphadenopathy.  ABDOMINAL WALL: Within normal limits.  BONES: Degenerative changes.    IMPRESSION:  Indeterminate left upper pole renal lesion measuring 1.5 cm possibly   hemorrhagic cyst. Consider CT scan or MRI with and without contrast for   definitive characterization.    No evidence of metastatic disease or suspicious adenopathy in the abdomen   and pelvis.    Redemonstration of large left loculated effusion with associated   compressive atelectasis.    Imaging Personally Reviewed: CT A/P    Consultant(s) Notes Reviewed:     Care Discussed with Consultants/Other Providers:

## 2022-12-13 NOTE — PROGRESS NOTE ADULT - ATTENDING COMMENTS
Patient seen and examined with team  Agree with above a/p by Dr Abreu  Pt is a 83yo M with PMH of type 2 DM, HTN, HLD, prior dengue infection (2019) who presents to ED with shortness of breath. Found to have large left sided pleural effusion. Admitted for further management. Had L lung thoracentesis done on 12/8.Ct of chest done 12/9 to r/o mass  Patient seen at bedside with son Paul Mcdonnell [phone]   Pe nad  Vital Signs Last 24 HrsT(F): 98.7, HR: 79 , BP: 149/85 ,RR: 18 , SpO2: 96%  nasal cannulaO2 Flow (L/min): 3  Lungs dec BS to L lung, R lung clear, cor rrr, abd soft n/t, ext neg e/c/c            ra< from: CT Abdomen and Pelvis w/ IV Cont (12.12.22 @ 10:38) >  IMPRESSION:  Indeterminate left upper pole renal lesion measuring 1.5 cm possibly   hemorrhagic cyst. Consider CT scan or MRI with and without contrast for   definitive characterization.  No evidence of metastatic disease or suspicious adenopathy in the abdomen   and pelvis.  Redemonstration of large left loculated effusion with associated       < from: US Kidney and Bladder (12.13.22 @ 08:47) >  Right kidney: 9.5 cm. No hydronephrosis. Increased parenchymal   echogenicity. 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 Pulmonary appreciated- pulmonary completed thoracentesis 12/8  f/u pleural effusion for culture and cytology. Lung mass- may need Bxp  #Id c/w Iv zosyn and vanco. monitor vanco trough .  blood culture is negative 12/10, antibiotics stopped  # HTN /75 today-c/w  norvasc 5 mg qd- hold sbp <100  # dvt proph, Lov sq  Await result of cytology on pleural fluid  reached out to Pathology [phone], they will expedite- daughter called and updated   plan d/w patient/daughter/team

## 2022-12-13 NOTE — PROGRESS NOTE ADULT - PROBLEM SELECTOR PLAN 3
Pt oxygenating well on RA following treatment of pleural effusion  - pleural effusion treated as above, tachypnea and hypoxia resolved at this time WDL

## 2022-12-13 NOTE — PROGRESS NOTE ADULT - PROBLEM SELECTOR PLAN 1
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

## 2022-12-13 NOTE — PROGRESS NOTE ADULT - PROBLEM SELECTOR PLAN 8
DVT ppx: hold chemical dvt ppx until after thoracentesis, SCDs for now  Diet: DASH halal  Dispo: home

## 2022-12-14 NOTE — PROGRESS NOTE ADULT - ATTENDING COMMENTS
Patient seen and examined with team  Agree with above a/p by Dr Abreu  Pt is a 81yo M with PMH of type 2 DM, HTN, HLD, prior dengue infection (2019) who presents to ED with shortness of breath. Found to have large left sided pleural effusion. Admitted for further management. Had L lung thoracentesis done on 12/8.Ct of chest done 12/9 to r/o mass  Patient seen at bedside with son Paul Mcdonnell [phone]   Pe nad  Vital Signs Last 24 Hrs T(F): 98.4 , HR: 89 , BP: 146/77 , RR: 17 , SpO2: 98%  nasal cannula O2 Flow (L/min): 2  Lungs dec BS to L lung, R lung clear, cor rrr, abd soft n/t, ext neg e/c/c            ra< from: CT Abdomen and Pelvis w/ IV Cont (12.12.22 @ 10:38) >  IMPRESSION:  Indeterminate left upper pole renal lesion measuring 1.5 cm possibly   hemorrhagic cyst. Consider CT scan or MRI with and without contrast for   definitive characterization.  No evidence of metastatic disease or suspicious adenopathy in the abdomen   and pelvis.  Redemonstration of large left loculated effusion with associated       < from: US Kidney and Bladder (12.13.22 @ 08:47) >  Right kidney: 9.5 cm. No hydronephrosis. Increased parenchymal   echogenicity. 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 Pulmonary appreciated- pulmonary completed thoracentesis 12/8  f/u pleural effusion for culture and cytology. Lung mass- may need Bxp  #Id c/w Iv zosyn and vanco. monitor vanco trough .  blood culture is negative 12/10, antibiotics stopped  # HTN /75 today-c/w  norvasc 5 mg qd- hold sbp <100  # dvt proph, Lov sq  Await result of cytology on pleural fluid  reached out to Pathology on 12/13 at [phone], they will expedite- daughter called and updated   plan d/w patient/daughter/team

## 2022-12-14 NOTE — DISCHARGE NOTE PROVIDER - NSDCCPTREATMENT_GEN_ALL_CORE_FT
PRINCIPAL PROCEDURE  Procedure: CT chest wo con  Findings and Treatment: IMPRESSION:  5 x 4.3 cmleft upper lobe mass as described above likely neoplastic in   etiology and representing a primary lung neoplasm with mildly enlarged   mediastinal lymph node as described above.  Small multiloculated left pleural effusion with interval decrease insize   with interval improved aeration of the left lung as compared with   December 7, 2022.  Nonspecific minimal erosion of the left fourth rib        SECONDARY PROCEDURE  Procedure: CT abdomen  Findings and Treatment: IMPRESSION:  Indeterminate left upper pole renal lesion measuring 1.5 cm possibly   hemorrhagic cyst. Consider CT scan or MRI with and without contrast for   definitive characterization.  No evidence of metastatic disease or suspicious adenopathy in the abdomen   and pelvis.  Redemonstration of large left loculated effusion with associated   compressive atelectasis      Procedure: Ultrasound of kidney and bladder  Findings and Treatment: IMPRESSION:  Increased renal parenchymal echogenicity compatible with medical renal   disease. No hydronephrosis  Diffuse bladder wall thickening likely represents chronic bladder outlet   obstruction given enlarged prostate< from: US Kidney and Bladder       Procedure: Transthoracic echocardiogram  Findings and Treatment: CONCLUSIONS:  1. Aortic valve not well visualized; probably normal.  2. Normal left ventricular systolic function. No segmental  wall motion abnormalities.  3. Normal right ventricular size and function.  4. Normal tricuspid valve. Mild-moderate tricuspid  regurgitation.  5. Estimated pulmonary artery systolic pressure equals 49  mm Hg, assuming right atrial pressure equals 10  mm Hg,  consistent with mild pulmonary hypertension.

## 2022-12-14 NOTE — CHART NOTE - NSCHARTNOTEFT_GEN_A_CORE
Pt is a 81yo M with PMH of type 2 DM, HTN, HLD, prior dengue infection (2019) who presents to ED with shortness of breath. Found to have large left sided pleural effusion. Now s/p thoracentesis with pathology showing adenocarcinoma of lung origin; further molecular studies pending. Patient will need brain MRI to complete staging (can be done inpatient or outpatient). From an oncology perspective, no contraindications to discharge. Will refer patient to [facility]. Discussed with oncology attending

## 2022-12-14 NOTE — DISCHARGE NOTE PROVIDER - NSDCMRMEDTOKEN_GEN_ALL_CORE_FT
amLODIPine 10 mg oral tablet: 1 tab(s) orally once a day  aspirin 81 mg oral tablet: 1 tab(s) orally once a day  atorvastatin 20 mg oral tablet: 1 tab(s) orally once a day  glimepiride 4 mg oral tablet: 1 tab(s) orally once a day  lisinopril 20 mg oral tablet: 1 tab(s) orally once a day  metFORMIN 750 mg oral tablet, extended release: 1 tab(s) orally once a day   amLODIPine 10 mg oral tablet: 1 tab(s) orally once a day  aspirin 81 mg oral tablet: 1 tab(s) orally once a day  atorvastatin 20 mg oral tablet: 1 tab(s) orally once a day  lisinopril 20 mg oral tablet: 1 tab(s) orally once a day  metFORMIN 1000 mg oral tablet: 1 tab(s) orally 2 times a day

## 2022-12-14 NOTE — PROGRESS NOTE ADULT - ASSESSMENT
Pt is a 81yo M with PMH of type 2 DM, HTN, HLD, prior dengue infection (2019) who presents to ED with shortness of breath. Found to have large left sided pleural effusion with subsequent finding of TAVON pulmonary mass on repeat imaging. Admitted for further management.

## 2022-12-14 NOTE — PROGRESS NOTE ADULT - PROBLEM SELECTOR PLAN 2
- large left sided pleural effusion seen on CT, likely traumatic in setting of recent fall  - pulm consulted, recs pending  - will send pleural fluid studies (including cytology) to better characterized pleural effusion  - continue supplemental O2 as needed, can up-titrate to bipap if needed  - continuous pulse ox  - empiric vanc/zosyn for concern of empyema-> D/C after thoracentesis if pleural fluid findings are not concerning for infection  - TTE completed, largely unremarkable aside from mild pulmonary hypertension Addressed with thoracentesis per pulm.   - large left sided pleural effusion seen on CT, likely traumatic in setting of recent fall  - pulm consulted, recs pending  - will send pleural fluid studies (including cytology) to better characterized pleural effusion  - continue supplemental O2 as needed, can up-titrate to bipap if needed  - continuous pulse ox  - empiric vanc/zosyn for concern of empyema-> D/C after thoracentesis if pleural fluid findings are not concerning for infection  - TTE completed, largely unremarkable aside from mild pulmonary hypertension

## 2022-12-14 NOTE — DISCHARGE NOTE PROVIDER - NSDCCPCAREPLAN_GEN_ALL_CORE_FT
PRINCIPAL DISCHARGE DIAGNOSIS  Diagnosis: Mass of upper lobe of left lung  Assessment and Plan of Treatment: We found a mass on your lung. This was the cause of the fluid buildup. From the results of the fluid that we sent off for analysis, we found that the cells in the fluid are cancerous. Please follow up with the pulmonologist for continued care regarding this mass and to move the treatment plan forward.       PRINCIPAL DISCHARGE DIAGNOSIS  Diagnosis: Mass of upper lobe of left lung  Assessment and Plan of Treatment: We found a mass on your lung. This was the cause of the fluid buildup. From the results of the fluid that we sent off for analysis, we found that the cells in the fluid are cancerous. Please follow up with the pulmonologist for continued care regarding this mass and to move the treatment plan forward.      SECONDARY DISCHARGE DIAGNOSES  Diagnosis: Type 2 diabetes mellitus  Assessment and Plan of Treatment: Please stop taking glimepiride. We have increased your metformin dose from 750mg to 1000mg twice a day. We have sent the prescription for the new dose to [facility] downstairs in the hospital.

## 2022-12-14 NOTE — CHART NOTE - NSCHARTNOTEFT_GEN_A_CORE
Assessed pt oxygen need at bedside on room air and oxygen - resting and walking/exercise.  Resting oxygen saturation on 1L/min NC: 94%  Resting oxygen saturation on RA: 91-92%   Exercise ambulatory sat on RA: 84%  Exercise ambulatory sat on 1L NC: 92-93% Assessed pt oxygen need at bedside on room air and oxygen - resting and walking/exercise.  Resting oxygen saturation on 1L/min NC: 94%  Resting oxygen saturation on RA: 91-92%   Exercise ambulatory sat on RA: 84%  Exercise ambulatory sat on 1L/min NC: 92-93% Assessed pt oxygen need at bedside on room air and oxygen - resting and walking/exercise.  Oxygen continuous rate: 1L/min nasal cannula  Resting oxygen saturation on 1L/min NC: 94%  Resting oxygen saturation on RA: 91-92%   Exercise ambulatory sat on RA: 84%  Exercise ambulatory sat on 1L/min NC: 92-93% Pt with newly diagnosed lung adenocarcinoma following thoracentesis. Assessed pt oxygen need at bedside on room air and oxygen - resting and walking/exercise. Assessment as follows:  Oxygen continuous rate: 1L/min nasal cannula  Resting oxygen saturation on 1L/min NC: 94%  Resting oxygen saturation on RA: 88%  Exercise ambulatory sat on RA: 84% with recovery to 93% on 1L/min nasal cannula    Pt will require home O2 secondary to desaturation on ambulation. Pt saturations tested in a chronic, stable state.

## 2022-12-14 NOTE — DISCHARGE NOTE PROVIDER - CARE PROVIDER_API CALL
Oncology at [facility],   Phone: [phone]  Fax: (   )    -  Follow Up Time: 1 week    Pulmonary, at [facility]  Phone: [phone]  Fax: (   )    -  Follow Up Time: 1 week

## 2022-12-14 NOTE — DISCHARGE NOTE PROVIDER - NSDCFUADDAPPT_GEN_ALL_CORE_FT
Please report to your scheduled appointment with pulmonology at [address] [place] [address], [address] on 12/21 at 3:40PM. Also, please arrange a follow up with [facility] for continued care regarding your recent diagnosis of lung cancer.

## 2022-12-14 NOTE — PROGRESS NOTE ADULT - PROBLEM SELECTOR PLAN 3
Pt oxygenating well on RA following treatment of pleural effusion  - pleural effusion treated as above, tachypnea and hypoxia resolved at this time Pt oxygenating well on 1.5LNC, following treatment of pleural effusion  - pleural effusion treated as above, tachypnea and hypoxia largely resolved at this time  - continuing weaning

## 2022-12-14 NOTE — DISCHARGE NOTE PROVIDER - NSDCCAREPROVSEEN_GEN_ALL_CORE_FT
[facility] Medicine Team 6 [facility] Medicine Team 6  Jennifer Starkey

## 2022-12-14 NOTE — PROGRESS NOTE ADULT - SUBJECTIVE AND OBJECTIVE BOX
Todd Abreu MD  PGY 1 Department of Internal Medicine        Patient is a 82y old  Male who presents with a chief complaint of shortness of breath (13 Dec 2022 07:23)      SUBJECTIVE / OVERNIGHT EVENTS: Pt seen and examined. No acute overnight events. Denies fevers, chills, CP, SOB, Abdominal pain, N/V, Constipation, Diarrhea        MEDICATIONS  (STANDING):  amLODIPine   Tablet 5 milliGRAM(s) Oral daily  atorvastatin 20 milliGRAM(s) Oral at bedtime  dextrose 5%. 1000 milliLiter(s) (50 mL/Hr) IV Continuous <Continuous>  dextrose 5%. 1000 milliLiter(s) (100 mL/Hr) IV Continuous <Continuous>  dextrose 50% Injectable 25 Gram(s) IV Push once  dextrose 50% Injectable 12.5 Gram(s) IV Push once  dextrose 50% Injectable 25 Gram(s) IV Push once  enoxaparin Injectable 40 milliGRAM(s) SubCutaneous every 24 hours  glucagon  Injectable 1 milliGRAM(s) IntraMuscular once  influenza  Vaccine (HIGH DOSE) 0.7 milliLiter(s) IntraMuscular once  insulin glargine Injectable (LANTUS) 3 Unit(s) SubCutaneous at bedtime  insulin lispro (ADMELOG) corrective regimen sliding scale   SubCutaneous three times a day before meals  insulin lispro (ADMELOG) corrective regimen sliding scale   SubCutaneous at bedtime  melatonin 3 milliGRAM(s) Oral at bedtime  senna 2 Tablet(s) Oral at bedtime    MEDICATIONS  (PRN):  acetaminophen     Tablet .. 650 milliGRAM(s) Oral every 6 hours PRN Mild Pain (1 - 3), Moderate Pain (4 - 6)  dextrose Oral Gel 15 Gram(s) Oral once PRN Blood Glucose LESS THAN 70 milliGRAM(s)/deciliter  polyethylene glycol 3350 17 Gram(s) Oral daily PRN Constipation      I&O's Summary      Vital Signs Last 24 Hrs  T(C): 36.9 (14 Dec 2022 06:51), Max: 36.9 (13 Dec 2022 21:46)  T(F): 98.4 (14 Dec 2022 06:51), Max: 98.5 (13 Dec 2022 21:46)  HR: 89 (14 Dec 2022 06:51) (75 - 100)  BP: 146/77 (14 Dec 2022 06:51) (146/77 - 160/86)  BP(mean): --  RR: 17 (14 Dec 2022 06:51) (17 - 18)  SpO2: 98% (14 Dec 2022 06:51) (97% - 98%)    Parameters below as of 14 Dec 2022 06:51  Patient On (Oxygen Delivery Method): nasal cannula  O2 Flow (L/min): 2      CAPILLARY BLOOD GLUCOSE      POCT Blood Glucose.: 314 mg/dL (13 Dec 2022 22:35)  POCT Blood Glucose.: 324 mg/dL (13 Dec 2022 18:12)  POCT Blood Glucose.: 331 mg/dL (13 Dec 2022 12:38)  POCT Blood Glucose.: 156 mg/dL (13 Dec 2022 09:13)      PHYSICAL EXAM:  GENERAL: NAD,   HEAD:  Atraumatic, Normocephalic  EYES: EOMI, PERRL, conjunctiva and sclera clear  NECK: No JVD  CHEST/LUNG: Clear to auscultation bilaterally; No wheeze  HEART: Regular rate and rhythm; No murmurs, rubs, or gallops  ABDOMEN: Soft, Nontender, Nondistended; Bowel sounds present  EXTREMITIES:  2+ Peripheral Pulses, No clubbing, cyanosis, or edema  PSYCH: AAOx3  NEUROLOGY: non-focal  SKIN: No rashes or lesions       LABS:                        12.6   7.25  )-----------( 191      ( 13 Dec 2022 06:10 )             37.8     Auto Eosinophil # x     / Auto Eosinophil % x     / Auto Neutrophil # x     / Auto Neutrophil % x     / BANDS % x                            13.6   7.47  )-----------( 199      ( 12 Dec 2022 07:15 )             39.5     Auto Eosinophil # x     / Auto Eosinophil % x     / Auto Neutrophil # x     / Auto Neutrophil % x     / BANDS % x        12-13    138  |  104  |  17  ----------------------------<  162<H>  4.2   |  23  |  0.89  12-12    136  |  102  |  17  ----------------------------<  162<H>  3.8   |  20<L>  |  0.93    Ca    8.7      13 Dec 2022 06:10  Mg     1.90     12-13  Phos  2.3     12-13                  RADIOLOGY & ADDITIONAL TESTS:    Imaging Personally Reviewed:    Consultant(s) Notes Reviewed:      Care Discussed with Consultants/Other Providers:   Todd Abreu MD  PGY 1 Department of Internal Medicine        Patient is a 82y old  Male who presents with a chief complaint of shortness of breath (13 Dec 2022 07:23)      SUBJECTIVE / OVERNIGHT EVENTS: Pt seen and examined. No acute overnight events. Denies fevers, chills, CP, SOB, Abdominal pain, N/V, Constipation, Diarrhea        MEDICATIONS  (STANDING):  amLODIPine   Tablet 5 milliGRAM(s) Oral daily  atorvastatin 20 milliGRAM(s) Oral at bedtime  dextrose 5%. 1000 milliLiter(s) (50 mL/Hr) IV Continuous <Continuous>  dextrose 5%. 1000 milliLiter(s) (100 mL/Hr) IV Continuous <Continuous>  dextrose 50% Injectable 25 Gram(s) IV Push once  dextrose 50% Injectable 12.5 Gram(s) IV Push once  dextrose 50% Injectable 25 Gram(s) IV Push once  enoxaparin Injectable 40 milliGRAM(s) SubCutaneous every 24 hours  glucagon  Injectable 1 milliGRAM(s) IntraMuscular once  influenza  Vaccine (HIGH DOSE) 0.7 milliLiter(s) IntraMuscular once  insulin glargine Injectable (LANTUS) 3 Unit(s) SubCutaneous at bedtime  insulin lispro (ADMELOG) corrective regimen sliding scale   SubCutaneous three times a day before meals  insulin lispro (ADMELOG) corrective regimen sliding scale   SubCutaneous at bedtime  melatonin 3 milliGRAM(s) Oral at bedtime  senna 2 Tablet(s) Oral at bedtime    MEDICATIONS  (PRN):  acetaminophen     Tablet .. 650 milliGRAM(s) Oral every 6 hours PRN Mild Pain (1 - 3), Moderate Pain (4 - 6)  dextrose Oral Gel 15 Gram(s) Oral once PRN Blood Glucose LESS THAN 70 milliGRAM(s)/deciliter  polyethylene glycol 3350 17 Gram(s) Oral daily PRN Constipation      I&O's Summary      Vital Signs Last 24 Hrs  T(C): 36.9 (14 Dec 2022 06:51), Max: 36.9 (13 Dec 2022 21:46)  T(F): 98.4 (14 Dec 2022 06:51), Max: 98.5 (13 Dec 2022 21:46)  HR: 89 (14 Dec 2022 06:51) (75 - 100)  BP: 146/77 (14 Dec 2022 06:51) (146/77 - 160/86)  BP(mean): --  RR: 17 (14 Dec 2022 06:51) (17 - 18)  SpO2: 98% (14 Dec 2022 06:51) (97% - 98%)    Parameters below as of 14 Dec 2022 06:51  Patient On (Oxygen Delivery Method): nasal cannula  O2 Flow (L/min): 2      CAPILLARY BLOOD GLUCOSE      POCT Blood Glucose.: 314 mg/dL (13 Dec 2022 22:35)  POCT Blood Glucose.: 324 mg/dL (13 Dec 2022 18:12)  POCT Blood Glucose.: 331 mg/dL (13 Dec 2022 12:38)  POCT Blood Glucose.: 156 mg/dL (13 Dec 2022 09:13)      PHYSICAL EXAM:  GENERAL: NAD,   HEAD:  Atraumatic, Normocephalic  EYES: EOMI, PERRL, conjunctiva and sclera clear  NECK: No JVD  CHEST/LUNG: Decreased breath sounds @ L lung base; No wheeze  HEART: Regular rate and rhythm; No murmurs, rubs, or gallops  ABDOMEN: Soft, Nontender, Nondistended; Bowel sounds present  EXTREMITIES:  2+ Peripheral Pulses, No clubbing, cyanosis, or edema  PSYCH: AAOx3  NEUROLOGY: non-focal  SKIN: No rashes or lesions       LABS:                        12.6   7.25  )-----------( 191      ( 13 Dec 2022 06:10 )             37.8     Auto Eosinophil # x     / Auto Eosinophil % x     / Auto Neutrophil # x     / Auto Neutrophil % x     / BANDS % x                            13.6   7.47  )-----------( 199      ( 12 Dec 2022 07:15 )             39.5     Auto Eosinophil # x     / Auto Eosinophil % x     / Auto Neutrophil # x     / Auto Neutrophil % x     / BANDS % x        12-13    138  |  104  |  17  ----------------------------<  162<H>  4.2   |  23  |  0.89  12-12    136  |  102  |  17  ----------------------------<  162<H>  3.8   |  20<L>  |  0.93    Ca    8.7      13 Dec 2022 06:10  Mg     1.90     12-13  Phos  2.3     12-13                  RADIOLOGY & ADDITIONAL TESTS:    US Kidney and Bladder (12.13.22 @ 08:47)  IMPRESSION:  Increased renal parenchymal echogenicity compatible with medical renal   disease. No hydronephrosis    Diffuse bladder wall thickening likely represents chronic bladder outlet   obstruction given enlarged prostate    Imaging Personally Reviewed: reviewed by me.

## 2022-12-14 NOTE — DISCHARGE NOTE PROVIDER - HOSPITAL COURSE
Patient is an 82y M with hx of HTN, T2DM that presented with progressive shortness of breath over the course of a week. He was found to be hypoxic requiring 5LNC and tachypnic with CXR positive for large left-sided pleural effusion. Thoracentesis was performed by pulmonology and pleural fluid sent off for analysis. 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 Pt was able to be reduced to 1LNC. Pt to be followed up with pulmonology outpatient.     Medications initiated: amlodipine 5mg -> 10mg  Medications stopped: none  Pertinent findings: ~5x4cm pulmonary mass affecting left upper lobe w/ malignant effusion (MOC31+, Per-EP4+) Patient is an 82y M with hx of HTN, T2DM that presented with progressive shortness of breath over the course of a week. He was found to be hypoxic requiring 5LNC and tachypnic with CXR positive for large left-sided pleural effusion. Thoracentesis was performed by pulmonology and pleural fluid sent off for analysis. 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 Pt was able to be reduced to 1LNC. Pt to be followed up with pulmonology outpatient.   Patient seen with son Paul at bedside 12/14/22  Result of cytology shared with Patient and son and CM present  Pleural fluid shows positive Adneno Carcinoma- Metastatic cancer  patient sat is 94 % on NC oxygen. He drops to 88 % on Ra with ambulation  Patient with have Oxygen for home  Oncology at [facility] notes out patient f/u at [facility] [phone] for chemotherapy  GOC- has HCP and wants treatment. FULL CODE  Plan d/w patient son Paul at bedside/ cm and team      Medications initiated: amlodipine 5mg -> 10mg  Medications stopped: none  Pertinent findings: ~5x4cm pulmonary mass affecting left upper lobe w/ malignant effusion (MOC31+, Per-EP4+) Patient is an 82y M with hx of HTN, T2DM that presented with progressive shortness of breath over the course of a week. He was found to be hypoxic requiring 5LNC and tachypnic with CXR positive for large left-sided pleural effusion. Thoracentesis was performed by pulmonology and pleural fluid sent off for analysis. 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 Pt was able to be reduced to 1LNC, however could not be weaned down further (see below). Pt to be followed up with pulmonology outpatient.   Patient seen with son Paul at bedside 12/14/22  Result of cytology shared with Patient and son and CM present  Pleural fluid shows positive Adneno Carcinoma- Metastatic cancer    Assessed pt oxygen need at bedside on room air and oxygen - resting and walking/exercise.  Oxygen continuous rate: 1L/min nasal cannula  Resting oxygen saturation on 1L/min NC: 94%  Resting oxygen saturation on RA: 91-92%   Exercise ambulatory sat on RA: 84%  Exercise ambulatory sat on 1L/min NC: 92-93%.  Patient will have Oxygen for home    Oncology at [facility] notes out patient f/u at [facility] [phone] for chemotherapy  GOC- has HCP and wants treatment. FULL CODE  Plan d/w patient son Paul at bedside/ cm and team      Medications initiated: amlodipine 5mg -> 10mg  Medications stopped: none  Pertinent findings: ~5x4cm pulmonary mass affecting left upper lobe w/ malignant effusion (MOC31+, Per-EP4+) Patient is an 82y M with hx of HTN, T2DM that presented with progressive shortness of breath over the course of a week. He was found to be hypoxic requiring 5LNC and tachypnic with CXR positive for large left-sided pleural effusion. Thoracentesis was performed by pulmonology and pleural fluid sent off for analysis. 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 Pt was able to be reduced to 1LNC, however could not be weaned down further (see below). Pt to be followed up with pulmonology outpatient.   Patient seen with son Paul at bedside 12/14/22  Result of cytology shared with Patient and son and CM present  Pleural fluid shows positive Adneno Carcinoma- Metastatic cancer    Assessed pt oxygen need at bedside on room air and oxygen - resting and walking/exercise.  Oxygen continuous rate: 1L/min nasal cannula  Resting oxygen saturation on 1L/min NC: 94%  Resting oxygen saturation on RA: 91-92%   Exercise ambulatory sat on RA: 84%  Exercise ambulatory sat on 1L/min NC: 92-93%.  Patient will have Oxygen for home    Oncology at [facility] notes out patient f/u at [facility] [phone] for chemotherapy  GOC- has HCP and wants treatment. FULL CODE  Plan d/w patient son Paul at bedside/ cm and team      Medications initiated: amlodipine 5mg -> 10mg  Medications stopped: glimepiride  Pertinent findings: ~5x4cm pulmonary mass affecting left upper lobe w/ malignant effusion (MOC31+, Per-EP4+) Patient is an 82y M with hx of HTN, T2DM that presented with progressive shortness of breath over the course of a week. He was found to be hypoxic requiring 5LNC and tachypnic with CXR positive for large left-sided pleural effusion. Thoracentesis was performed by pulmonology and pleural fluid sent off for analysis. 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 Pt was able to be reduced to 1LNC, however could not be weaned down further (see below). Pt to be followed up with pulmonology outpatient.   Patient seen with son Paul at bedside 12/14/22  Result of cytology shared with Patient and son and CM present  Pleural fluid shows positive Adneno Carcinoma- Metastatic cancer.  New diagnosis of AdenoCa of Lung with hypoxia secondary to pleural effusion requiring home oxygen.    Assessed pt oxygen need at bedside on room air and oxygen - resting and walking/exercise.  Oxygen continuous rate: 1L/min nasal cannula  Resting oxygen saturation on 1L/min NC: 94%  Resting oxygen saturation on RA: 91-92%   Exercise ambulatory sat on RA: 84%  Exercise ambulatory sat on 1L/min NC: 92-93%.  Patient will have Oxygen for home    Oncology at [facility] notes out patient f/u at [facility] [phone] for chemotherapy  GOC- has HCP and wants treatment. FULL CODE  Plan d/w patient son Paul at bedside/ cm and team      Medications initiated: amlodipine 5mg -> 10mg  Medications stopped: glimepiride  Pertinent findings: ~5x4cm pulmonary mass affecting left upper lobe w/ malignant effusion (MOC31+, Per-EP4+)

## 2022-12-14 NOTE — PROGRESS NOTE ADULT - PROBLEM SELECTOR PLAN 4
- patient follows with outpatient neurologist for gait issues  - b12 wnl  - PT consulted - patient follows with outpatient neurologist for gait issues  - b12 wnl  - PT evaluated pt, no skilled needs

## 2022-12-14 NOTE — CHART NOTE - NSCHARTNOTEFT_GEN_A_CORE
Patient seen with son Paul at bedside  Result of cytology shared with Patient and son and CM present  Pleural fluid shows positive Adneno Carcinoma- Metastatic cancer  patient sat is 94 % on NC oxygen. He drops to 88 % on Ra with ambulation  Patient with have Oxygen for home  Oncology at [facility] notes out patient f/u at [facility] [phone] for chemotherapy  GOC- has HCP and wants treatment. FULL CODE  Plan d/w patient son Paul at bedside/ cm and team    Internal Medicine  Jennifer Starkey  # [phone]

## 2022-12-14 NOTE — DISCHARGE NOTE PROVIDER - NSFOLLOWUPCLINICS_GEN_ALL_ED_FT
[facility] Pulmonolgy and Sleep Medicine  Pulmonology  [address] [place] [address], [place] [address]  Phone: [phone]  Fax:

## 2022-12-14 NOTE — DISCHARGE NOTE PROVIDER - NSDCFUSCHEDAPPT_GEN_ALL_CORE_FT
[facility]  Dhiraj oRque  Scheduled Appointment: 12/22/2022     [facility]  GABBY [address] [place] [address]  Scheduled Appointment: 12/21/2022    [facility] retana  [facility]  Dhiraj [facility]  Scheduled Appointment: 12/22/2022

## 2022-12-14 NOTE — PROGRESS NOTE ADULT - PROBLEM SELECTOR PLAN 5
- patient on metformin and glimeperide at home  - low dose ISS while in-patient Patient on metformin and glimeperide at home, sugars in 300s multiple istances during admission  >initiating 2u premeal admelog  - low dose ISS while in-patient

## 2022-12-14 NOTE — PROGRESS NOTE ADULT - PROBLEM SELECTOR PLAN 1
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

## 2022-12-14 NOTE — DISCHARGE NOTE PROVIDER - PROVIDER TOKENS
FREE:[LAST:[Oncology at [facility]],PHONE:[phone]],FAX:[(   )    -],FOLLOWUP:[1 week]],FREE:[LAST:[Pulmonary],FIRST:[at [facility]],PHONE:[phone]],FAX:[(   )    -],FOLLOWUP:[1 week]]

## 2022-12-15 PROBLEM — E11.9 TYPE 2 DIABETES MELLITUS WITHOUT COMPLICATIONS: Chronic | Status: ACTIVE | Noted: 2022-01-01

## 2022-12-15 PROBLEM — A90 DENGUE FEVER [CLASSICAL DENGUE]: Chronic | Status: ACTIVE | Noted: 2022-01-01

## 2022-12-15 PROBLEM — Z00.00 ENCOUNTER FOR PREVENTIVE HEALTH EXAMINATION: Status: ACTIVE | Noted: 2022-01-01

## 2022-12-15 PROBLEM — I10 ESSENTIAL (PRIMARY) HYPERTENSION: Chronic | Status: ACTIVE | Noted: 2022-01-01

## 2022-12-15 NOTE — DIETITIAN INITIAL EVALUATION ADULT - HEIGHT FOR BMI (FEET)
Health Maintenance Due   Topic Date Due   â¢ Influenza Vaccine (1) 08/01/2018        Patient is due for topics as listed above but declines at this time. 5

## 2022-12-15 NOTE — DISCHARGE NOTE NURSING/CASE MANAGEMENT/SOCIAL WORK - NSDCDMENAME_GEN_ALL_CORE_FT
POC-Portable oxygen concentrator delivered to bedside. Patient to be discharged with POC. Home concentrator to be delivered to patient's home address 12/16/2022.

## 2022-12-15 NOTE — DISCHARGE NOTE NURSING/CASE MANAGEMENT/SOCIAL WORK - NSDCPEFALRISK_GEN_ALL_CORE
For information on Fall & Injury Prevention, visit: [email or web address] OR  [email or web address] OR  https://www.cdc.gov/steadi/patient.html

## 2022-12-15 NOTE — PROGRESS NOTE ADULT - ASSESSMENT
81YO Male with PMH of DMII, type 2 DM, HTN, HLD, prior dengue infection (2019), and remote smoking history, presents to the ER with 3 weeks of progressive shortness of breath found to have large left sided pleural effusion. Pulmonary consulted for further management.    #Large left sided unilateral effusion:  - complete left lung atelectasis on CT chest with large left sided pleural effusion  - s/p thoracentesis with 2L of cloudy serous fluid removed; fluid is exudative by lights criteria and glucose/pH WNL; cx negative  - s/p repeat CT chest showing large left upper lobe mass with areas of loculated pleural effusion    Recommendations   - given lung mass finding and pleural effusion, concern for metastatic lung cancer  - would recommend CT A/P inpatient to evaluate for any other evidence of metastatic lesions  - can follow up cytopath from pleural fluid; however if nondiagnostic will need tissue diagnosis   - if no acute intervention required based on CT A/P findings, patient can be discharged from a pulmonary perspective and follow up for cytopath results 83YO Male with PMH of DMII, type 2 DM, HTN, HLD, prior dengue infection (2019), and remote smoking history, presents to the ER with 3 weeks of progressive shortness of breath found to have large left sided pleural effusion. Pulmonary consulted for further management.    #Large left sided unilateral effusion:  - complete left lung atelectasis on CT chest with large left sided pleural effusion  - s/p thoracentesis with 2L of cloudy serous fluid removed; fluid is exudative by lights criteria and glucose/pH WNL; cx negative  - s/p repeat CT chest showing large left upper lobe mass with areas of loculated pleural effusion    Recommendations   - given lung mass finding and pleural effusion, concern for metastatic lung cancer  - would recommend CT A/P inpatient to evaluate for any other evidence of metastatic lesions  - Cytopathology showing metastatic adenocarcinoma  Metastatic adenocarcinoma.    Cytology slides and cell block consists of groups and single lying    malignant cells with enlarged nuclei containing prominent nucleoli and  vacuolated cytoplasm, morphologically in favor of adenocarcinoma.  Immunohistochemical studies   show positive MOC31 and Anant-EP4, while  negative TTF1, p40, , calretinin and WT1. Although TTF1 immunostain  is negative, in light of "a 5 x 4.3 cm left upper lobe mass" (chest CT;  12/09/2022), findings are in favor of adenocarcinoma of lung origin.

## 2022-12-15 NOTE — DISCHARGE NOTE NURSING/CASE MANAGEMENT/SOCIAL WORK - PATIENT PORTAL LINK FT
You can access the FollowMyHealth Patient Portal offered by [facility] by registering at the following website: [email or web address]. By joining [employer]’s FollowMyHealth portal, you will also be able to view your health information using other applications (apps) compatible with our system.

## 2022-12-15 NOTE — PROGRESS NOTE ADULT - SUBJECTIVE AND OBJECTIVE BOX
Todd Abreu MD  PGY 1 Department of Internal Medicine        Patient is a 82y old  Male who presents with a chief complaint of shortness of breath (14 Dec 2022 14:14)      SUBJECTIVE / OVERNIGHT EVENTS: Pt seen and examined. No acute overnight events. Denies fevers, chills, CP, SOB, Abdominal pain, N/V, Constipation, Diarrhea        MEDICATIONS  (STANDING):  amLODIPine   Tablet 10 milliGRAM(s) Oral daily  atorvastatin 20 milliGRAM(s) Oral at bedtime  dextrose 5%. 1000 milliLiter(s) (50 mL/Hr) IV Continuous <Continuous>  dextrose 5%. 1000 milliLiter(s) (100 mL/Hr) IV Continuous <Continuous>  dextrose 50% Injectable 25 Gram(s) IV Push once  dextrose 50% Injectable 12.5 Gram(s) IV Push once  dextrose 50% Injectable 25 Gram(s) IV Push once  enoxaparin Injectable 40 milliGRAM(s) SubCutaneous every 24 hours  glucagon  Injectable 1 milliGRAM(s) IntraMuscular once  influenza  Vaccine (HIGH DOSE) 0.7 milliLiter(s) IntraMuscular once  insulin glargine Injectable (LANTUS) 3 Unit(s) SubCutaneous at bedtime  insulin lispro (ADMELOG) corrective regimen sliding scale   SubCutaneous three times a day before meals  insulin lispro (ADMELOG) corrective regimen sliding scale   SubCutaneous at bedtime  insulin lispro Injectable (ADMELOG) 2 Unit(s) SubCutaneous three times a day before meals  lisinopril 20 milliGRAM(s) Oral daily  melatonin 3 milliGRAM(s) Oral at bedtime  senna 2 Tablet(s) Oral at bedtime    MEDICATIONS  (PRN):  acetaminophen     Tablet .. 650 milliGRAM(s) Oral every 6 hours PRN Mild Pain (1 - 3), Moderate Pain (4 - 6)  dextrose Oral Gel 15 Gram(s) Oral once PRN Blood Glucose LESS THAN 70 milliGRAM(s)/deciliter  polyethylene glycol 3350 17 Gram(s) Oral daily PRN Constipation      I&O's Summary      Vital Signs Last 24 Hrs  T(C): 36.8 (15 Dec 2022 05:40), Max: 37.2 (14 Dec 2022 21:19)  T(F): 98.2 (15 Dec 2022 05:40), Max: 99 (14 Dec 2022 21:19)  HR: 82 (15 Dec 2022 05:40) (82 - 98)  BP: 160/79 (15 Dec 2022 05:40) (138/72 - 160/79)  BP(mean): --  RR: 18 (15 Dec 2022 05:40) (16 - 22)  SpO2: 95% (15 Dec 2022 05:40) (84% - 97%)    Parameters below as of 15 Dec 2022 05:40  Patient On (Oxygen Delivery Method): nasal cannula  O2 Flow (L/min): 1      CAPILLARY BLOOD GLUCOSE      POCT Blood Glucose.: 296 mg/dL (14 Dec 2022 22:15)  POCT Blood Glucose.: 263 mg/dL (14 Dec 2022 18:03)  POCT Blood Glucose.: 354 mg/dL (14 Dec 2022 12:26)  POCT Blood Glucose.: 176 mg/dL (14 Dec 2022 09:27)      PHYSICAL EXAM:  GENERAL: NAD,   HEAD:  Atraumatic, Normocephalic  EYES: EOMI, PERRL, conjunctiva and sclera clear  NECK: No JVD  CHEST/LUNG: Clear to auscultation bilaterally; No wheeze  HEART: Regular rate and rhythm; No murmurs, rubs, or gallops  ABDOMEN: Soft, Nontender, Nondistended; Bowel sounds present  EXTREMITIES:  2+ Peripheral Pulses, No clubbing, cyanosis, or edema  PSYCH: AAOx3  NEUROLOGY: non-focal  SKIN: No rashes or lesions       LABS:                        13.2   7.86  )-----------( 180      ( 15 Dec 2022 06:00 )             39.0     Auto Eosinophil # x     / Auto Eosinophil % x     / Auto Neutrophil # x     / Auto Neutrophil % x     / BANDS % x                            12.5   7.14  )-----------( 181      ( 14 Dec 2022 06:15 )             36.8     Auto Eosinophil # x     / Auto Eosinophil % x     / Auto Neutrophil # x     / Auto Neutrophil % x     / BANDS % x        12-14    139  |  105  |  17  ----------------------------<  183<H>  4.5   |  23  |  0.82    Ca    8.8      14 Dec 2022 06:15  Mg     1.90     12-14  Phos  2.5     12-14                  RADIOLOGY & ADDITIONAL TESTS:    Imaging Personally Reviewed:    Consultant(s) Notes Reviewed:      Care Discussed with Consultants/Other Providers:

## 2022-12-15 NOTE — PROGRESS NOTE ADULT - ATTENDING COMMENTS
Patient seen and examined with team  Agree with above a/p by Dr Abreu  Pt is a 81yo M with PMH of type 2 DM, HTN, HLD, prior dengue infection (2019) who presents to ED with shortness of breath. Found to have large left sided pleural effusion. Admitted for further management. Had L lung thoracentesis done on 12/8.Ct of chest done 12/9 to r/o mass  Patient seen at bedside with son Paul Mcdonnell [phone]   Pe nad  Vital Signs Last 24 Hrs T(F): 98.4 , HR: 89 , BP: 146/77 , RR: 17 , SpO2: 98%  nasal cannula O2 Flow (L/min): 2  Lungs dec BS to L lung, R lung clear, cor rrr, abd soft n/t, ext neg e/c/c            ra< from: CT Abdomen and Pelvis w/ IV Cont (12.12.22 @ 10:38) >  IMPRESSION:  Indeterminate left upper pole renal lesion measuring 1.5 cm possibly   hemorrhagic cyst. Consider CT scan or MRI with and without contrast for   definitive characterization.  No evidence of metastatic disease or suspicious adenopathy in the abdomen   and pelvis.  Redemonstration of large left loculated effusion with associated     12/8/22 cytology- positive for Adeno Ca to Pleural Fluid    A/P Acute Respiratory failure with hypoxia sec to large L pleural effusion, and TAVON mass on Ct 12/9  # Pulm c/w NC oxygen. Pulmonary appreciated- pulmonary completed thoracentesis 12/8  f/u pleural effusion for culture and cytology. Lung mass- may need Bxp  #Id c/w Iv zosyn and vanco. monitor vanco trough .  blood culture is negative 12/10, antibiotics stopped  #Hypoxia due to Lung cancer- Home with Oxygen  # Oncology- out patient f/u at [facility] for chemotherapy for met adenocarcinoma of the lungs to Pleural fluid. Son Paul explained plan in great detail-will call [phone] [facility] for appointment- ct and cytology report given to patient- [facility] will also reach out to them.  # HTN /75 today-c/w  norvasc 5 mg qd- hold sbp <100  # dvt proph, Lov sq  Home with Oxygen.out patient f/u Pulm and Oncology  plan d/w patient/son/team/   50 min to coord care

## 2022-12-15 NOTE — DIETITIAN INITIAL EVALUATION ADULT - OTHER CALCULATIONS
Dosing weight (12/8) 145 lbs.   No weight history available via chart.  Ideal Body Weight: 154 lbs / 70 kg +/-10%

## 2022-12-15 NOTE — DIETITIAN INITIAL EVALUATION ADULT - ADD RECOMMEND
1) Recommend change diet to Halal Consistent Carbohydrate without evening snack.   2) Recommend addition of Glucerna 1 PO 2x daily (provides 220 kcal, 10 gm protein per 8oz serving).  3) Encouraged PO intake as tolerated, with prioritization on protein dense tray items. Obtained food preferences and will honor as able.  4) Adjustment of insulin regimen to optimize glycemic management.  5) Monitor electrolytes (K+, Mg, P) and replete to within desired limits as clinically indicated.   6) Obtain weekly weights.

## 2022-12-15 NOTE — DIETITIAN INITIAL EVALUATION ADULT - PERTINENT LABORATORY DATA
(12/15) Na 137, BUN 21, Cr 0.81, <H>, K+ 4.2, Phos 2.2<L>, Mg 1.80  (12/8) HbA1c 5.7%<H>  POCT: (12/15) 361, (12/14) 176-343

## 2022-12-15 NOTE — PROGRESS NOTE ADULT - PROBLEM SELECTOR PLAN 5
Patient on metformin and glimeperide at home, sugars in 300s multiple istances during admission  >initiating 2u premeal admelog  - low dose ISS while in-patient

## 2022-12-15 NOTE — PROGRESS NOTE ADULT - PROBLEM SELECTOR PLAN 2
Addressed with thoracentesis per pulm.   - large left sided pleural effusion seen on CT, likely traumatic in setting of recent fall  - pulm consulted, recs pending  - will send pleural fluid studies (including cytology) to better characterized pleural effusion  - continue supplemental O2 as needed, can up-titrate to bipap if needed  - continuous pulse ox  - empiric vanc/zosyn for concern of empyema-> D/C after thoracentesis if pleural fluid findings are not concerning for infection  - TTE completed, largely unremarkable aside from mild pulmonary hypertension

## 2022-12-15 NOTE — PROGRESS NOTE ADULT - PROBLEM SELECTOR PLAN 4
- patient follows with outpatient neurologist for gait issues  - b12 wnl  - PT evaluated pt, no skilled needs

## 2022-12-15 NOTE — PROGRESS NOTE ADULT - PROBLEM SELECTOR PLAN 3
Pt oxygenating well on 1.5LNC, following treatment of pleural effusion  - pleural effusion treated as above, tachypnea and hypoxia largely resolved at this time  - continuing weaning

## 2022-12-15 NOTE — DISCHARGE NOTE NURSING/CASE MANAGEMENT/SOCIAL WORK - NSDCPNINST_GEN_ALL_CORE
Patient is alert and clinically stable, no complaint of pain, portable O2 delivered for discharge, IV access discontinued per hospital discharge policy, family at bedside for discharge pick-up.

## 2022-12-15 NOTE — CHART NOTE - NSCHARTNOTEFT_GEN_A_CORE
83YO Male with PMH of DMII, type 2 DM, HTN, HLD, prior dengue infection (2019), and remote smoking history, presents to the ER with 3 weeks of progressive shortness of breath found to have large left sided pleural effusion. Pulmonary consulted for further management.    #Large left sided unilateral effusion:  - complete left lung atelectasis on CT chest with large left sided pleural effusion  - s/p thoracentesis with 2L of cloudy serous fluid removed; fluid is exudative by lights criteria and glucose/pH WNL; cx negative  - s/p repeat CT chest showing large left upper lobe mass with areas of loculated pleural effusion  - CT abd/pelvis without evidence of metastatic disease  - Cytopathology showing metastatic adenocarcinoma- Negative for TTF1, p40, , calretinin and WT1   - Despite TTF1 negative immunostain pathology believes due to size of TAVON lung origin is still favored  - Please have pt follow up in pulmonary clinic  Please email: [email or web address] to setup an appointment prior to discharge with any available pulmonologist. The appointment should be within 1-2 weeks of discharge from the hospital. Include the patient's name, , MRN and contact information in the email.      Pulmonary/Sleep Clinic  [address] [place] [address]  [phone]    Please discuss the appointment details with the patient and include the appointment details in the patients discharge summary.    Pulmonary to sign off. Please call with questions

## 2022-12-15 NOTE — DISCHARGE NOTE NURSING/CASE MANAGEMENT/SOCIAL WORK - NSDCFUADDAPPT_GEN_ALL_CORE_FT
Please report to your scheduled appointment with pulmonology at [address] [place] [address], [address] on 12/21 at 3:40PM. Also, please arrange a follow up with [facility] for continued care regarding your recent diagnosis of lung cancer.

## 2022-12-15 NOTE — DIETITIAN INITIAL EVALUATION ADULT - OTHER INFO
Per chart, pt is 82 year old Setswana speaking male PMH type 2 DM, HTN, HLD, prior dengue infection (2019) presenting with SOB found to have large left sided pleural effusion, TAVON pulmonary mass, cytopathology showing metastatic adenocarcinoma. Pulmonology following. Full code per [facility].     Offered to provide translation services, pt prefer to call daughter Nahomi. Pt lives at home with wife and daughter, no issues with access to/preparation of food PTA. Confirms NKFA. Pt consumes Halal diet, minimal intake of fish but enjoys eggs/chicken/beef/vegetables. History of type 2 DM, HbA1c (12/8) 5.7%; medications PTA inclusive of Metformin 750 mg qD and Glimepiride 4 mg qD. Endorses decreased appetite/PO intake x2-3 weeks PTA.     Pt with improved PO intake since admission, tolerating diet. Amenable to provision of nutrition supplement, food preferences vocalized for yogurt and fruit. No noted GI distress, unknown last BM; ordered for senna 2 tablets qHS PRN and Miralax 17 gm qD PRN. Fingersticks above desired limit; ordered for Admelog 2U TIDac/Lantus 3U qHS/correctional sliding scale. Current diet does not include consistent carbohydrate restriction. Labs notable for hypophosphatemia, repleted.

## 2022-12-15 NOTE — DIETITIAN INITIAL EVALUATION ADULT - PERTINENT MEDS FT
atorvastatin, LANTUS 3U qHS, ADMELOG corrective regimen sliding scale, ADMELOG 2U TIDac, lisinopril, potassium phosphate / sodium phosphate Powder, senna, polyethylene glycol PRN

## 2022-12-15 NOTE — DIETITIAN INITIAL EVALUATION ADULT - ETIOLOGY
decreased appetite with acute illness, inability to meet increased protein-energy needs with catabolic disease endocrine, renal related organ dysfunction

## 2022-12-15 NOTE — PROGRESS NOTE ADULT - SUBJECTIVE AND OBJECTIVE BOX
CHIEF COMPLAINT:    Interval Events:    REVIEW OF SYSTEMS:  Constitutional: [ ] negative [ ] fevers [ ] chills [ ] weight loss [ ] weight gain  HEENT: [ ] negative [ ] dry eyes [ ] eye irritation [ ] postnasal drip [ ] nasal congestion  CV: [ ] negative  [ ] chest pain [ ] orthopnea [ ] palpitations [ ] murmur  Resp: [ ] negative [ ] cough [ ] shortness of breath [ ] dyspnea [ ] wheezing [ ] sputum [ ] hemoptysis  GI: [ ] negative [ ] nausea [ ] vomiting [ ] diarrhea [ ] constipation [ ] abd pain [ ] dysphagia   : [ ] negative [ ] dysuria [ ] nocturia [ ] hematuria [ ] increased urinary frequency  Musculoskeletal: [ ] negative [ ] back pain [ ] myalgias [ ] arthralgias [ ] fracture  Skin: [ ] negative [ ] rash [ ] itch  Neurological: [ ] negative [ ] headache [ ] dizziness [ ] syncope [ ] weakness [ ] numbness  Psychiatric: [ ] negative [ ] anxiety [ ] depression  Endocrine: [ ] negative [ ] diabetes [ ] thyroid problem  Hematologic/Lymphatic: [ ] negative [ ] anemia [ ] bleeding problem  Allergic/Immunologic: [ ] negative [ ] itchy eyes [ ] nasal discharge [ ] hives [ ] angioedema  [ ] All other systems negative  [ ] Unable to assess ROS because ________    OBJECTIVE:  ICU Vital Signs Last 24 Hrs  T(C): 36.8 (15 Dec 2022 05:40), Max: 37.2 (14 Dec 2022 21:19)  T(F): 98.2 (15 Dec 2022 05:40), Max: 99 (14 Dec 2022 21:19)  HR: 82 (15 Dec 2022 05:40) (82 - 98)  BP: 160/79 (15 Dec 2022 05:40) (138/72 - 160/79)  BP(mean): --  ABP: --  ABP(mean): --  RR: 18 (15 Dec 2022 05:40) (16 - 22)  SpO2: 95% (15 Dec 2022 05:40) (84% - 97%)    O2 Parameters below as of 15 Dec 2022 05:40  Patient On (Oxygen Delivery Method): nasal cannula  O2 Flow (L/min): 1            CAPILLARY BLOOD GLUCOSE      POCT Blood Glucose.: 296 mg/dL (14 Dec 2022 22:15)      PHYSICAL EXAM:  General:   HEENT:   Lymph Nodes:  Neck:   Respiratory:   Cardiovascular:   Abdomen:   Extremities:   Skin:   Neurological:  Psychiatry:    HOSPITAL MEDICATIONS:  MEDICATIONS  (STANDING):  amLODIPine   Tablet 10 milliGRAM(s) Oral daily  atorvastatin 20 milliGRAM(s) Oral at bedtime  dextrose 5%. 1000 milliLiter(s) (50 mL/Hr) IV Continuous <Continuous>  dextrose 5%. 1000 milliLiter(s) (100 mL/Hr) IV Continuous <Continuous>  dextrose 50% Injectable 25 Gram(s) IV Push once  dextrose 50% Injectable 12.5 Gram(s) IV Push once  dextrose 50% Injectable 25 Gram(s) IV Push once  enoxaparin Injectable 40 milliGRAM(s) SubCutaneous every 24 hours  glucagon  Injectable 1 milliGRAM(s) IntraMuscular once  influenza  Vaccine (HIGH DOSE) 0.7 milliLiter(s) IntraMuscular once  insulin glargine Injectable (LANTUS) 3 Unit(s) SubCutaneous at bedtime  insulin lispro (ADMELOG) corrective regimen sliding scale   SubCutaneous three times a day before meals  insulin lispro (ADMELOG) corrective regimen sliding scale   SubCutaneous at bedtime  insulin lispro Injectable (ADMELOG) 2 Unit(s) SubCutaneous three times a day before meals  lisinopril 20 milliGRAM(s) Oral daily  melatonin 3 milliGRAM(s) Oral at bedtime  potassium phosphate / sodium phosphate Powder (PHOS-NaK) 1 Packet(s) Oral three times a day before meals  senna 2 Tablet(s) Oral at bedtime    MEDICATIONS  (PRN):  acetaminophen     Tablet .. 650 milliGRAM(s) Oral every 6 hours PRN Mild Pain (1 - 3), Moderate Pain (4 - 6)  dextrose Oral Gel 15 Gram(s) Oral once PRN Blood Glucose LESS THAN 70 milliGRAM(s)/deciliter  polyethylene glycol 3350 17 Gram(s) Oral daily PRN Constipation      LABS:                        13.2   7.86  )-----------( 180      ( 15 Dec 2022 06:00 )             39.0     Hgb Trend: 13.2<--, 12.5<--, 12.6<--, 13.6<--, 13.2<--  12-15    137  |  102  |  21  ----------------------------<  203<H>  4.2   |  23  |  0.81    Ca    9.2      15 Dec 2022 06:00  Phos  2.2     12-15  Mg     1.80     12-15      Creatinine Trend: 0.81<--, 0.82<--, 0.89<--, 0.93<--, 0.92<--, 1.04<--            MICROBIOLOGY:       RADIOLOGY:  [ ] Reviewed and interpreted by me    PULMONARY FUNCTION TESTS:    EKG:

## 2022-12-21 PROBLEM — E11.9 DIABETES MELLITUS, TYPE 2: Status: ACTIVE | Noted: 2022-01-01

## 2022-12-21 PROBLEM — Z87.891 FORMER SMOKER: Status: ACTIVE | Noted: 2022-01-01

## 2022-12-21 NOTE — HISTORY OF PRESENT ILLNESS
[TextBox_4] : 83YO Male with PMH of DMII, type 2 DM, HTN, HLD, prior dengue infection (2019),\par and remote smoking history, presents to the ER with 3 weeks of progressive\par shortness of breath found to have large left sided pleural effusion. Discharged on 12/16. \par \par he initially presented with complete left lung atelectasis on CT chest with large left sided pleural\par effusion. I snow s/p thoracentesis with 2L of cloudy serous fluid removed; fluid is exudative\par by lights criteria and glucose/pH WNL; cx negative s/p repeat CT chest showing large left upper lobe mass with areas of\par loculated pleural effusion CT abd/pelvis without evidence of metastatic disease. Cytopathology showing metastatic adenocarcinoma- Negative for TTF1, p40, , calretinin and WT1 Despite TTF1 negative immunostain pathology believes due to size of TAVON lung\par origin is still favored\par \par Now seen in pulmonary clinic with son. He is symptomatically doing fine though gets slightly more short of breath every day. Is able to lay flat. has been using a stable amount of oxygen at home. has hematology appointment tomorrow

## 2022-12-21 NOTE — PHYSICAL EXAM
[No Acute Distress] : no acute distress [Normal Oropharynx] : normal oropharynx [Normal Appearance] : normal appearance [No Neck Mass] : no neck mass [Normal Rate/Rhythm] : normal rate/rhythm [Normal S1, S2] : normal s1, s2 [No Murmurs] : no murmurs [No Abnormalities] : no abnormalities [Normal Gait] : normal gait [No Clubbing] : no clubbing [No Cyanosis] : no cyanosis [No Edema] : no edema [FROM] : FROM [TextBox_68] : decrease breath sounds on left

## 2022-12-21 NOTE — ASSESSMENT
[FreeTextEntry1] : 83YO Male with PMH of DMII, type 2 DM, HTN, HLD, prior dengue infection (2019),\par and remote smoking history, presents after follow up with malignant pleural effusion and large pulmonary mass likely lung adenocarcinoma. \par \par Presents as follow up for malignant pleural effusion \par  \par - POCUS today showing fluid with some striations and loculations. \par - symptomatically stable\par - Plan for hematology eval tomorrow. Emailed to see if more tissue is needed for treatment plan \par - may need repeat thoracentesis vs Pleurx in the future. counselled to call if symptoms are getting worse and will try to plan for outpatient procedure. Pending duration of time between thoracentesis, may benefit from PleurX in the future.

## 2022-12-21 NOTE — PROCEDURE
[Thoracic Ultrasound] : Thoracic Ultrasound [A line] : A line: Yes [Complex] : complex [Normal] : Normal [Consolidation] : Consolidation: No

## 2022-12-22 NOTE — HISTORY OF PRESENT ILLNESS
[de-identified] : 82m with newly diagnosed adenocarcinoma of the lung with malignant pleural effusion, presenting for oncology evaluation. \par \par Pt has a h/o DMII, type 2 DM, HTN, HLD, prior dengue infection (2019), and remote smoking history (20 pack years, quit 35 years ago), he presented to the ER with 3 weeks of progressive shortness of breath found to have large left sided pleural effusion. He was discharged on 12/16/22. \par S/p thoracentesis with 2L of cloudy serous fluid removed. \par Repeat CT chest showing large left upper lobe mass with areas of loculated pleural effusion \par CT abd/pelvis without evidence of metastatic disease. \par Cytopathology showing metastatic adenocarcinoma- Negative for TTF1, p40, , calretinin and WT1 \par \par He denies pain, fever, cough. He has JERNIGAN. \par \par Today, he presents with his son, Prabhjot. He is primarily Danish speaking. 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 \par His appetite is OK and weight is stable. \par He spends his days at home, mostly in bed. \par He has a good social support system and children and other family members live close by.\par \par

## 2022-12-22 NOTE — ASSESSMENT
[FreeTextEntry1] : 82m with recently diagnosed adenocarcinoma of lung, presenting for an oncology eval. \par Work up to date and disease course was discussed with patient and his son Prabhjot in detail. \par Staging work up and NGS results are needed to determine the appropriate treatment. \par Different treatment options including chemotherapy, immunotherapy and targeted treatment options was discussed.\par Prognosis was discussed in detail. \par All questions answered.\par \par -PET/CT\par -MRI brain with and without contrast\par -Follow foundation, estimate report date 12/27/22\par -Guardant liquid biopsy today\par -RTC after the above\par \par Vivi Segovia MD\par Medical Oncology and Hematology\par \par Total time of this visit, including time spent face to face with patient and/or via video/audio, and also in preparing for today's visit for MDM and documentation (Medical Decision Making, including consideration of possible diagnoses, management options, complex medical record review, review of diagnostic tests and information, consideration and discussion of significant complications based on comorbidities, and discussion with providers involved with the care of the patient) 70 minutes.\par \par

## 2022-12-22 NOTE — PHYSICAL EXAM
[Ambulatory and capable of all self care but unable to carry out any work activities] : Status 2- Ambulatory and capable of all self care but unable to carry out any work activities. Up and about more than 50% of waking hours [Normal] : affect appropriate [de-identified] : Decrease breath sounds on the left side up to the mid lung field

## 2023-01-01 ENCOUNTER — INPATIENT (INPATIENT)
Facility: HOSPITAL | Age: 83
LOS: 12 days | Discharge: HOME | End: 2023-01-18
Attending: STUDENT IN AN ORGANIZED HEALTH CARE EDUCATION/TRAINING PROGRAM | Admitting: STUDENT IN AN ORGANIZED HEALTH CARE EDUCATION/TRAINING PROGRAM
Payer: MEDICAID

## 2023-01-01 ENCOUNTER — RESULT REVIEW (OUTPATIENT)
Age: 83
End: 2023-01-01

## 2023-01-01 ENCOUNTER — NON-APPOINTMENT (OUTPATIENT)
Age: 83
End: 2023-01-01

## 2023-01-01 ENCOUNTER — LABORATORY RESULT (OUTPATIENT)
Age: 83
End: 2023-01-01

## 2023-01-01 ENCOUNTER — APPOINTMENT (OUTPATIENT)
Dept: INFUSION THERAPY | Facility: HOSPITAL | Age: 83
End: 2023-01-01

## 2023-01-01 ENCOUNTER — TRANSCRIPTION ENCOUNTER (OUTPATIENT)
Age: 83
End: 2023-01-01

## 2023-01-01 ENCOUNTER — APPOINTMENT (OUTPATIENT)
Dept: NUCLEAR MEDICINE | Facility: CLINIC | Age: 83
End: 2023-01-01
Payer: MEDICAID

## 2023-01-01 ENCOUNTER — APPOINTMENT (OUTPATIENT)
Dept: PULMONOLOGY | Facility: CLINIC | Age: 83
End: 2023-01-01
Payer: MEDICAID

## 2023-01-01 ENCOUNTER — APPOINTMENT (OUTPATIENT)
Dept: HEMATOLOGY ONCOLOGY | Facility: CLINIC | Age: 83
End: 2023-01-01
Payer: MEDICAID

## 2023-01-01 ENCOUNTER — OUTPATIENT (OUTPATIENT)
Dept: OUTPATIENT SERVICES | Facility: HOSPITAL | Age: 83
LOS: 1 days | End: 2023-01-01
Payer: MEDICAID

## 2023-01-01 ENCOUNTER — APPOINTMENT (OUTPATIENT)
Dept: MRI IMAGING | Facility: CLINIC | Age: 83
End: 2023-01-01
Payer: MEDICAID

## 2023-01-01 VITALS
WEIGHT: 120 LBS | DIASTOLIC BLOOD PRESSURE: 65 MMHG | TEMPERATURE: 97.7 F | OXYGEN SATURATION: 91 % | HEART RATE: 82 BPM | BODY MASS INDEX: 18.83 KG/M2 | SYSTOLIC BLOOD PRESSURE: 120 MMHG | HEIGHT: 67 IN

## 2023-01-01 VITALS
HEART RATE: 127 BPM | DIASTOLIC BLOOD PRESSURE: 66 MMHG | WEIGHT: 117.95 LBS | RESPIRATION RATE: 16 BRPM | BODY MASS INDEX: 18.47 KG/M2 | TEMPERATURE: 98.1 F | OXYGEN SATURATION: 94 % | SYSTOLIC BLOOD PRESSURE: 136 MMHG

## 2023-01-01 VITALS
HEART RATE: 69 BPM | DIASTOLIC BLOOD PRESSURE: 70 MMHG | SYSTOLIC BLOOD PRESSURE: 150 MMHG | OXYGEN SATURATION: 93 % | RESPIRATION RATE: 18 BRPM | TEMPERATURE: 98 F

## 2023-01-01 VITALS
HEIGHT: 68 IN | SYSTOLIC BLOOD PRESSURE: 161 MMHG | DIASTOLIC BLOOD PRESSURE: 82 MMHG | OXYGEN SATURATION: 94 % | HEART RATE: 91 BPM | RESPIRATION RATE: 30 BRPM | TEMPERATURE: 98 F

## 2023-01-01 DIAGNOSIS — J90 PLEURAL EFFUSION, NOT ELSEWHERE CLASSIFIED: ICD-10-CM

## 2023-01-01 DIAGNOSIS — C34.92 MALIGNANT NEOPLASM OF UNSPECIFIED PART OF LEFT BRONCHUS OR LUNG: ICD-10-CM

## 2023-01-01 DIAGNOSIS — E11.9 TYPE 2 DIABETES MELLITUS WITHOUT COMPLICATIONS: ICD-10-CM

## 2023-01-01 DIAGNOSIS — R06.00 DYSPNEA, UNSPECIFIED: ICD-10-CM

## 2023-01-01 DIAGNOSIS — J91.0 MALIGNANT PLEURAL EFFUSION: ICD-10-CM

## 2023-01-01 DIAGNOSIS — Z51.11 ENCOUNTER FOR ANTINEOPLASTIC CHEMOTHERAPY: ICD-10-CM

## 2023-01-01 DIAGNOSIS — C34.90 MALIGNANT NEOPLASM OF UNSPECIFIED PART OF UNSPECIFIED BRONCHUS OR LUNG: ICD-10-CM

## 2023-01-01 DIAGNOSIS — Z98.890 OTHER SPECIFIED POSTPROCEDURAL STATES: Chronic | ICD-10-CM

## 2023-01-01 DIAGNOSIS — Z29.9 ENCOUNTER FOR PROPHYLACTIC MEASURES, UNSPECIFIED: ICD-10-CM

## 2023-01-01 DIAGNOSIS — I10 ESSENTIAL (PRIMARY) HYPERTENSION: ICD-10-CM

## 2023-01-01 DIAGNOSIS — J96.01 ACUTE RESPIRATORY FAILURE WITH HYPOXIA: ICD-10-CM

## 2023-01-01 DIAGNOSIS — R06.02 SHORTNESS OF BREATH: ICD-10-CM

## 2023-01-01 DIAGNOSIS — E86.0 DEHYDRATION: ICD-10-CM

## 2023-01-01 DIAGNOSIS — D72.829 ELEVATED WHITE BLOOD CELL COUNT, UNSPECIFIED: ICD-10-CM

## 2023-01-01 DIAGNOSIS — Z00.8 ENCOUNTER FOR OTHER GENERAL EXAMINATION: ICD-10-CM

## 2023-01-01 LAB
A1C WITH ESTIMATED AVERAGE GLUCOSE RESULT: 6.6 % — HIGH (ref 4–5.6)
ALBUMIN FLD-MCNC: 2.3 G/DL — SIGNIFICANT CHANGE UP
ALBUMIN SERPL ELPH-MCNC: 3.7 G/DL — SIGNIFICANT CHANGE UP (ref 3.3–5)
ALBUMIN SERPL ELPH-MCNC: 3.9 G/DL — SIGNIFICANT CHANGE UP (ref 3.3–5)
ALBUMIN SERPL ELPH-MCNC: 3.9 G/DL — SIGNIFICANT CHANGE UP (ref 3.3–5)
ALBUMIN SERPL ELPH-MCNC: 4 G/DL — SIGNIFICANT CHANGE UP (ref 3.3–5)
ALBUMIN SERPL ELPH-MCNC: 4.1 G/DL — SIGNIFICANT CHANGE UP (ref 3.3–5)
ALP SERPL-CCNC: 79 U/L — SIGNIFICANT CHANGE UP (ref 40–120)
ALP SERPL-CCNC: 83 U/L — SIGNIFICANT CHANGE UP (ref 40–120)
ALP SERPL-CCNC: 89 U/L — SIGNIFICANT CHANGE UP (ref 40–120)
ALP SERPL-CCNC: 92 U/L — SIGNIFICANT CHANGE UP (ref 40–120)
ALP SERPL-CCNC: 93 U/L — SIGNIFICANT CHANGE UP (ref 40–120)
ALT FLD-CCNC: 20 U/L — SIGNIFICANT CHANGE UP (ref 4–41)
ALT FLD-CCNC: 22 U/L — SIGNIFICANT CHANGE UP (ref 4–41)
ALT FLD-CCNC: 5 U/L — SIGNIFICANT CHANGE UP (ref 4–41)
ALT FLD-CCNC: 9 U/L — LOW (ref 10–45)
ALT FLD-CCNC: 9 U/L — LOW (ref 10–45)
ANION GAP SERPL CALC-SCNC: 10 MMOL/L — SIGNIFICANT CHANGE UP (ref 7–14)
ANION GAP SERPL CALC-SCNC: 10 MMOL/L — SIGNIFICANT CHANGE UP (ref 7–14)
ANION GAP SERPL CALC-SCNC: 12 MMOL/L — SIGNIFICANT CHANGE UP (ref 7–14)
ANION GAP SERPL CALC-SCNC: 13 MMOL/L — SIGNIFICANT CHANGE UP (ref 7–14)
ANION GAP SERPL CALC-SCNC: 14 MMOL/L — SIGNIFICANT CHANGE UP (ref 7–14)
ANION GAP SERPL CALC-SCNC: 15 MMOL/L — SIGNIFICANT CHANGE UP (ref 5–17)
ANION GAP SERPL CALC-SCNC: 23 MMOL/L — HIGH (ref 5–17)
APTT BLD: 47.6 SEC — HIGH (ref 27–36.3)
APTT BLD: 48.9 SEC — HIGH (ref 27–36.3)
APTT BLD: 55 SEC — HIGH (ref 27–36.3)
AST SERPL-CCNC: 13 U/L — SIGNIFICANT CHANGE UP (ref 4–40)
AST SERPL-CCNC: 15 U/L — SIGNIFICANT CHANGE UP (ref 10–40)
AST SERPL-CCNC: 16 U/L — SIGNIFICANT CHANGE UP (ref 10–40)
AST SERPL-CCNC: 24 U/L — SIGNIFICANT CHANGE UP (ref 4–40)
AST SERPL-CCNC: 32 U/L — SIGNIFICANT CHANGE UP (ref 4–40)
B PERT IGG+IGM PNL SER: ABNORMAL
BASE EXCESS BLDV CALC-SCNC: -5.6 MMOL/L — LOW (ref -2–3)
BASOPHILS # BLD AUTO: 0.03 K/UL — SIGNIFICANT CHANGE UP (ref 0–0.2)
BASOPHILS NFR BLD AUTO: 0.2 % — SIGNIFICANT CHANGE UP (ref 0–2)
BASOPHILS NFR BLD AUTO: 0.3 % — SIGNIFICANT CHANGE UP (ref 0–2)
BILIRUB SERPL-MCNC: 0.3 MG/DL — SIGNIFICANT CHANGE UP (ref 0.2–1.2)
BILIRUB SERPL-MCNC: 0.3 MG/DL — SIGNIFICANT CHANGE UP (ref 0.2–1.2)
BILIRUB SERPL-MCNC: 0.4 MG/DL — SIGNIFICANT CHANGE UP (ref 0.2–1.2)
BILIRUB SERPL-MCNC: 0.5 MG/DL — SIGNIFICANT CHANGE UP (ref 0.2–1.2)
BILIRUB SERPL-MCNC: 0.5 MG/DL — SIGNIFICANT CHANGE UP (ref 0.2–1.2)
BLD GP AB SCN SERPL QL: NEGATIVE — SIGNIFICANT CHANGE UP
BLOOD GAS VENOUS COMPREHENSIVE RESULT: SIGNIFICANT CHANGE UP
BUN SERPL-MCNC: 18 MG/DL — SIGNIFICANT CHANGE UP (ref 7–23)
BUN SERPL-MCNC: 22 MG/DL — SIGNIFICANT CHANGE UP (ref 7–23)
BUN SERPL-MCNC: 23 MG/DL — SIGNIFICANT CHANGE UP (ref 7–23)
BUN SERPL-MCNC: 24 MG/DL — HIGH (ref 7–23)
BUN SERPL-MCNC: 25 MG/DL — HIGH (ref 7–23)
BUN SERPL-MCNC: 28 MG/DL — HIGH (ref 7–23)
BUN SERPL-MCNC: 29 MG/DL — HIGH (ref 7–23)
BUN SERPL-MCNC: 31 MG/DL — HIGH (ref 7–23)
BUN SERPL-MCNC: 33 MG/DL — HIGH (ref 7–23)
BUN SERPL-MCNC: 40 MG/DL — HIGH (ref 7–23)
BUN SERPL-MCNC: 62 MG/DL — HIGH (ref 7–23)
CALCIUM SERPL-MCNC: 10.1 MG/DL — SIGNIFICANT CHANGE UP (ref 8.4–10.5)
CALCIUM SERPL-MCNC: 10.5 MG/DL — SIGNIFICANT CHANGE UP (ref 8.4–10.5)
CALCIUM SERPL-MCNC: 10.6 MG/DL — HIGH (ref 8.4–10.5)
CALCIUM SERPL-MCNC: 9.4 MG/DL — SIGNIFICANT CHANGE UP (ref 8.4–10.5)
CALCIUM SERPL-MCNC: 9.5 MG/DL — SIGNIFICANT CHANGE UP (ref 8.4–10.5)
CALCIUM SERPL-MCNC: 9.5 MG/DL — SIGNIFICANT CHANGE UP (ref 8.4–10.5)
CALCIUM SERPL-MCNC: 9.6 MG/DL — SIGNIFICANT CHANGE UP (ref 8.4–10.5)
CALCIUM SERPL-MCNC: 9.7 MG/DL — SIGNIFICANT CHANGE UP (ref 8.4–10.5)
CALCIUM SERPL-MCNC: 9.8 MG/DL — SIGNIFICANT CHANGE UP (ref 8.4–10.5)
CALCIUM SERPL-MCNC: 9.9 MG/DL — SIGNIFICANT CHANGE UP (ref 8.4–10.5)
CHLORIDE BLDV-SCNC: 106 MMOL/L — SIGNIFICANT CHANGE UP (ref 96–108)
CHLORIDE SERPL-SCNC: 100 MMOL/L — SIGNIFICANT CHANGE UP (ref 98–107)
CHLORIDE SERPL-SCNC: 101 MMOL/L — SIGNIFICANT CHANGE UP (ref 96–108)
CHLORIDE SERPL-SCNC: 101 MMOL/L — SIGNIFICANT CHANGE UP (ref 98–107)
CHLORIDE SERPL-SCNC: 102 MMOL/L — SIGNIFICANT CHANGE UP (ref 96–108)
CHLORIDE SERPL-SCNC: 105 MMOL/L — SIGNIFICANT CHANGE UP (ref 98–107)
CHLORIDE SERPL-SCNC: 98 MMOL/L — SIGNIFICANT CHANGE UP (ref 98–107)
CHLORIDE SERPL-SCNC: 99 MMOL/L — SIGNIFICANT CHANGE UP (ref 98–107)
CHLORIDE SERPL-SCNC: 99 MMOL/L — SIGNIFICANT CHANGE UP (ref 98–107)
CO2 BLDV-SCNC: 21.3 MMOL/L — LOW (ref 22–26)
CO2 SERPL-SCNC: 11 MMOL/L — LOW (ref 22–31)
CO2 SERPL-SCNC: 19 MMOL/L — LOW (ref 22–31)
CO2 SERPL-SCNC: 20 MMOL/L — LOW (ref 22–31)
CO2 SERPL-SCNC: 21 MMOL/L — LOW (ref 22–31)
CO2 SERPL-SCNC: 21 MMOL/L — LOW (ref 22–31)
CO2 SERPL-SCNC: 22 MMOL/L — SIGNIFICANT CHANGE UP (ref 22–31)
CO2 SERPL-SCNC: 22 MMOL/L — SIGNIFICANT CHANGE UP (ref 22–31)
CO2 SERPL-SCNC: 23 MMOL/L — SIGNIFICANT CHANGE UP (ref 22–31)
CO2 SERPL-SCNC: 23 MMOL/L — SIGNIFICANT CHANGE UP (ref 22–31)
CO2 SERPL-SCNC: 24 MMOL/L — SIGNIFICANT CHANGE UP (ref 22–31)
COLOR FLD: ABNORMAL
CREAT SERPL-MCNC: 0.87 MG/DL — SIGNIFICANT CHANGE UP (ref 0.5–1.3)
CREAT SERPL-MCNC: 0.88 MG/DL — SIGNIFICANT CHANGE UP (ref 0.5–1.3)
CREAT SERPL-MCNC: 0.88 MG/DL — SIGNIFICANT CHANGE UP (ref 0.5–1.3)
CREAT SERPL-MCNC: 0.9 MG/DL — SIGNIFICANT CHANGE UP (ref 0.5–1.3)
CREAT SERPL-MCNC: 0.9 MG/DL — SIGNIFICANT CHANGE UP (ref 0.5–1.3)
CREAT SERPL-MCNC: 0.94 MG/DL — SIGNIFICANT CHANGE UP (ref 0.5–1.3)
CREAT SERPL-MCNC: 0.98 MG/DL — SIGNIFICANT CHANGE UP (ref 0.5–1.3)
CREAT SERPL-MCNC: 0.98 MG/DL — SIGNIFICANT CHANGE UP (ref 0.5–1.3)
CREAT SERPL-MCNC: 0.99 MG/DL — SIGNIFICANT CHANGE UP (ref 0.5–1.3)
CREAT SERPL-MCNC: 1.05 MG/DL — SIGNIFICANT CHANGE UP (ref 0.5–1.3)
CREAT SERPL-MCNC: 1.05 MG/DL — SIGNIFICANT CHANGE UP (ref 0.5–1.3)
CREAT SERPL-MCNC: 1.18 MG/DL — SIGNIFICANT CHANGE UP (ref 0.5–1.3)
CREAT SERPL-MCNC: 1.25 MG/DL — SIGNIFICANT CHANGE UP (ref 0.5–1.3)
CULTURE RESULTS: SIGNIFICANT CHANGE UP
EGFR: 57 ML/MIN/1.73M2 — LOW
EGFR: 61 ML/MIN/1.73M2 — SIGNIFICANT CHANGE UP
EGFR: 70 ML/MIN/1.73M2 — SIGNIFICANT CHANGE UP
EGFR: 70 ML/MIN/1.73M2 — SIGNIFICANT CHANGE UP
EGFR: 76 ML/MIN/1.73M2 — SIGNIFICANT CHANGE UP
EGFR: 77 ML/MIN/1.73M2 — SIGNIFICANT CHANGE UP
EGFR: 77 ML/MIN/1.73M2 — SIGNIFICANT CHANGE UP
EGFR: 80 ML/MIN/1.73M2 — SIGNIFICANT CHANGE UP
EGFR: 85 ML/MIN/1.73M2 — SIGNIFICANT CHANGE UP
EGFR: 86 ML/MIN/1.73M2 — SIGNIFICANT CHANGE UP
EOSINOPHIL # BLD AUTO: 0.1 K/UL — SIGNIFICANT CHANGE UP (ref 0–0.5)
EOSINOPHIL # BLD AUTO: 0.32 K/UL — SIGNIFICANT CHANGE UP (ref 0–0.5)
EOSINOPHIL # BLD AUTO: 0.42 K/UL — SIGNIFICANT CHANGE UP (ref 0–0.5)
EOSINOPHIL # BLD AUTO: 0.47 K/UL — SIGNIFICANT CHANGE UP (ref 0–0.5)
EOSINOPHIL # FLD: 1 % — SIGNIFICANT CHANGE UP
EOSINOPHIL NFR BLD AUTO: 0.7 % — SIGNIFICANT CHANGE UP (ref 0–6)
EOSINOPHIL NFR BLD AUTO: 3 % — SIGNIFICANT CHANGE UP (ref 0–6)
EOSINOPHIL NFR BLD AUTO: 4.3 % — SIGNIFICANT CHANGE UP (ref 0–6)
EOSINOPHIL NFR BLD AUTO: 5.3 % — SIGNIFICANT CHANGE UP (ref 0–6)
ESTIMATED AVERAGE GLUCOSE: 143 — SIGNIFICANT CHANGE UP
FLUAV AG NPH QL: SIGNIFICANT CHANGE UP
FLUBV AG NPH QL: SIGNIFICANT CHANGE UP
FLUID INTAKE SUBSTANCE CLASS: SIGNIFICANT CHANGE UP
GAS PNL BLDV: 137 MMOL/L — SIGNIFICANT CHANGE UP (ref 136–145)
GLUCOSE BLDC GLUCOMTR-MCNC: 115 MG/DL — HIGH (ref 70–99)
GLUCOSE BLDC GLUCOMTR-MCNC: 133 MG/DL — HIGH (ref 70–99)
GLUCOSE BLDC GLUCOMTR-MCNC: 136 MG/DL — HIGH (ref 70–99)
GLUCOSE BLDC GLUCOMTR-MCNC: 136 MG/DL — HIGH (ref 70–99)
GLUCOSE BLDC GLUCOMTR-MCNC: 146 MG/DL — HIGH (ref 70–99)
GLUCOSE BLDC GLUCOMTR-MCNC: 153 MG/DL — HIGH (ref 70–99)
GLUCOSE BLDC GLUCOMTR-MCNC: 155 MG/DL — HIGH (ref 70–99)
GLUCOSE BLDC GLUCOMTR-MCNC: 159 MG/DL — HIGH (ref 70–99)
GLUCOSE BLDC GLUCOMTR-MCNC: 161 MG/DL — HIGH (ref 70–99)
GLUCOSE BLDC GLUCOMTR-MCNC: 167 MG/DL — HIGH (ref 70–99)
GLUCOSE BLDC GLUCOMTR-MCNC: 167 MG/DL — HIGH (ref 70–99)
GLUCOSE BLDC GLUCOMTR-MCNC: 168 MG/DL — HIGH (ref 70–99)
GLUCOSE BLDC GLUCOMTR-MCNC: 182 MG/DL — HIGH (ref 70–99)
GLUCOSE BLDC GLUCOMTR-MCNC: 186 MG/DL — HIGH (ref 70–99)
GLUCOSE BLDC GLUCOMTR-MCNC: 192 MG/DL — HIGH (ref 70–99)
GLUCOSE BLDC GLUCOMTR-MCNC: 192 MG/DL — HIGH (ref 70–99)
GLUCOSE BLDC GLUCOMTR-MCNC: 199 MG/DL — HIGH (ref 70–99)
GLUCOSE BLDC GLUCOMTR-MCNC: 203 MG/DL — HIGH (ref 70–99)
GLUCOSE BLDC GLUCOMTR-MCNC: 203 MG/DL — HIGH (ref 70–99)
GLUCOSE BLDC GLUCOMTR-MCNC: 206 MG/DL — HIGH (ref 70–99)
GLUCOSE BLDC GLUCOMTR-MCNC: 207 MG/DL — HIGH (ref 70–99)
GLUCOSE BLDC GLUCOMTR-MCNC: 210 MG/DL — HIGH (ref 70–99)
GLUCOSE BLDC GLUCOMTR-MCNC: 212 MG/DL — HIGH (ref 70–99)
GLUCOSE BLDC GLUCOMTR-MCNC: 226 MG/DL — HIGH (ref 70–99)
GLUCOSE BLDC GLUCOMTR-MCNC: 230 MG/DL — HIGH (ref 70–99)
GLUCOSE BLDC GLUCOMTR-MCNC: 232 MG/DL — HIGH (ref 70–99)
GLUCOSE BLDC GLUCOMTR-MCNC: 232 MG/DL — HIGH (ref 70–99)
GLUCOSE BLDC GLUCOMTR-MCNC: 235 MG/DL — HIGH (ref 70–99)
GLUCOSE BLDC GLUCOMTR-MCNC: 236 MG/DL — HIGH (ref 70–99)
GLUCOSE BLDC GLUCOMTR-MCNC: 238 MG/DL — HIGH (ref 70–99)
GLUCOSE BLDC GLUCOMTR-MCNC: 246 MG/DL — HIGH (ref 70–99)
GLUCOSE BLDC GLUCOMTR-MCNC: 261 MG/DL — HIGH (ref 70–99)
GLUCOSE BLDC GLUCOMTR-MCNC: 261 MG/DL — HIGH (ref 70–99)
GLUCOSE BLDC GLUCOMTR-MCNC: 275 MG/DL — HIGH (ref 70–99)
GLUCOSE BLDC GLUCOMTR-MCNC: 282 MG/DL — HIGH (ref 70–99)
GLUCOSE BLDC GLUCOMTR-MCNC: 283 MG/DL — HIGH (ref 70–99)
GLUCOSE BLDC GLUCOMTR-MCNC: 289 MG/DL — HIGH (ref 70–99)
GLUCOSE BLDC GLUCOMTR-MCNC: 302 MG/DL — HIGH (ref 70–99)
GLUCOSE BLDC GLUCOMTR-MCNC: 305 MG/DL — HIGH (ref 70–99)
GLUCOSE BLDC GLUCOMTR-MCNC: 307 MG/DL — HIGH (ref 70–99)
GLUCOSE BLDC GLUCOMTR-MCNC: 308 MG/DL — HIGH (ref 70–99)
GLUCOSE BLDC GLUCOMTR-MCNC: 332 MG/DL — HIGH (ref 70–99)
GLUCOSE BLDC GLUCOMTR-MCNC: 337 MG/DL — HIGH (ref 70–99)
GLUCOSE BLDC GLUCOMTR-MCNC: 337 MG/DL — HIGH (ref 70–99)
GLUCOSE BLDC GLUCOMTR-MCNC: 342 MG/DL — HIGH (ref 70–99)
GLUCOSE BLDC GLUCOMTR-MCNC: 353 MG/DL — HIGH (ref 70–99)
GLUCOSE BLDC GLUCOMTR-MCNC: 355 MG/DL — HIGH (ref 70–99)
GLUCOSE BLDC GLUCOMTR-MCNC: 356 MG/DL — HIGH (ref 70–99)
GLUCOSE BLDC GLUCOMTR-MCNC: 373 MG/DL — HIGH (ref 70–99)
GLUCOSE BLDC GLUCOMTR-MCNC: 376 MG/DL — HIGH (ref 70–99)
GLUCOSE BLDC GLUCOMTR-MCNC: 392 MG/DL — HIGH (ref 70–99)
GLUCOSE BLDC GLUCOMTR-MCNC: 428 MG/DL — HIGH (ref 70–99)
GLUCOSE BLDC GLUCOMTR-MCNC: 72 MG/DL — SIGNIFICANT CHANGE UP (ref 70–99)
GLUCOSE BLDC GLUCOMTR-MCNC: 77 MG/DL — SIGNIFICANT CHANGE UP (ref 70–99)
GLUCOSE BLDC GLUCOMTR-MCNC: 91 MG/DL — SIGNIFICANT CHANGE UP (ref 70–99)
GLUCOSE BLDV-MCNC: 67 MG/DL — LOW (ref 70–99)
GLUCOSE FLD-MCNC: 10 MG/DL — SIGNIFICANT CHANGE UP
GLUCOSE SERPL-MCNC: 119 MG/DL — HIGH (ref 70–99)
GLUCOSE SERPL-MCNC: 134 MG/DL — HIGH (ref 70–99)
GLUCOSE SERPL-MCNC: 153 MG/DL — HIGH (ref 70–99)
GLUCOSE SERPL-MCNC: 154 MG/DL — HIGH (ref 70–99)
GLUCOSE SERPL-MCNC: 178 MG/DL — HIGH (ref 70–99)
GLUCOSE SERPL-MCNC: 185 MG/DL — HIGH (ref 70–99)
GLUCOSE SERPL-MCNC: 194 MG/DL — HIGH (ref 70–99)
GLUCOSE SERPL-MCNC: 212 MG/DL — HIGH (ref 70–99)
GLUCOSE SERPL-MCNC: 245 MG/DL — HIGH (ref 70–99)
GLUCOSE SERPL-MCNC: 254 MG/DL — HIGH (ref 70–99)
GLUCOSE SERPL-MCNC: 255 MG/DL — HIGH (ref 70–99)
GLUCOSE SERPL-MCNC: 77 MG/DL — SIGNIFICANT CHANGE UP (ref 70–99)
GLUCOSE SERPL-MCNC: 86 MG/DL — SIGNIFICANT CHANGE UP (ref 70–99)
GRAM STN FLD: SIGNIFICANT CHANGE UP
HBV CORE AB SER-ACNC: SIGNIFICANT CHANGE UP
HBV SURFACE AB SER-ACNC: SIGNIFICANT CHANGE UP
HBV SURFACE AG SER-ACNC: SIGNIFICANT CHANGE UP
HCO3 BLDV-SCNC: 20 MMOL/L — LOW (ref 22–29)
HCT VFR BLD CALC: 33 % — LOW (ref 39–50)
HCT VFR BLD CALC: 33.2 % — LOW (ref 39–50)
HCT VFR BLD CALC: 35.1 % — LOW (ref 39–50)
HCT VFR BLD CALC: 36.2 % — LOW (ref 39–50)
HCT VFR BLD CALC: 36.8 % — LOW (ref 39–50)
HCT VFR BLD CALC: 37 % — LOW (ref 39–50)
HCT VFR BLD CALC: 37.1 % — LOW (ref 39–50)
HCT VFR BLD CALC: 37.5 % — LOW (ref 39–50)
HCT VFR BLD CALC: 37.7 % — LOW (ref 39–50)
HCT VFR BLD CALC: 38.1 % — LOW (ref 39–50)
HCT VFR BLD CALC: 39 % — SIGNIFICANT CHANGE UP (ref 39–50)
HCT VFR BLD CALC: 39.3 % — SIGNIFICANT CHANGE UP (ref 39–50)
HCT VFR BLD CALC: 45 % — SIGNIFICANT CHANGE UP (ref 39–50)
HCT VFR BLDA CALC: 37 % — LOW (ref 39–51)
HCV AB S/CO SERPL IA: 0.16 S/CO — SIGNIFICANT CHANGE UP (ref 0–0.99)
HCV AB SERPL-IMP: SIGNIFICANT CHANGE UP
HGB BLD CALC-MCNC: 12.4 G/DL — LOW (ref 13–17)
HGB BLD-MCNC: 10.9 G/DL — LOW (ref 13–17)
HGB BLD-MCNC: 11.4 G/DL — LOW (ref 13–17)
HGB BLD-MCNC: 12 G/DL — LOW (ref 13–17)
HGB BLD-MCNC: 12.2 G/DL — LOW (ref 13–17)
HGB BLD-MCNC: 12.2 G/DL — LOW (ref 13–17)
HGB BLD-MCNC: 12.3 G/DL — LOW (ref 13–17)
HGB BLD-MCNC: 12.3 G/DL — LOW (ref 13–17)
HGB BLD-MCNC: 12.4 G/DL — LOW (ref 13–17)
HGB BLD-MCNC: 12.7 G/DL — LOW (ref 13–17)
HGB BLD-MCNC: 12.9 G/DL — LOW (ref 13–17)
HGB BLD-MCNC: 12.9 G/DL — LOW (ref 13–17)
HGB BLD-MCNC: 13.3 G/DL — SIGNIFICANT CHANGE UP (ref 13–17)
HGB BLD-MCNC: 14.2 G/DL — SIGNIFICANT CHANGE UP (ref 13–17)
IANC: 6.16 K/UL — SIGNIFICANT CHANGE UP (ref 1.8–7.4)
IANC: 7.35 K/UL — SIGNIFICANT CHANGE UP (ref 1.8–7.4)
IANC: 8.24 K/UL — HIGH (ref 1.8–7.4)
IMM GRANULOCYTES NFR BLD AUTO: 0.3 % — SIGNIFICANT CHANGE UP (ref 0–0.9)
IMM GRANULOCYTES NFR BLD AUTO: 0.4 % — SIGNIFICANT CHANGE UP (ref 0–0.9)
IMM GRANULOCYTES NFR BLD AUTO: 0.5 % — SIGNIFICANT CHANGE UP (ref 0–0.9)
IMM GRANULOCYTES NFR BLD AUTO: 0.7 % — SIGNIFICANT CHANGE UP (ref 0–0.9)
INR BLD: 1 RATIO — SIGNIFICANT CHANGE UP (ref 0.88–1.16)
INR BLD: 1.05 RATIO — SIGNIFICANT CHANGE UP (ref 0.88–1.16)
INR BLD: 1.08 RATIO — SIGNIFICANT CHANGE UP (ref 0.88–1.16)
LACTATE BLDV-MCNC: 1.4 MMOL/L — SIGNIFICANT CHANGE UP (ref 0.5–2)
LACTATE BLDV-MCNC: 2.9 MMOL/L — HIGH (ref 0.5–2)
LDH SERPL L TO P-CCNC: 1389 U/L — SIGNIFICANT CHANGE UP
LYMPHOCYTES # BLD AUTO: 0.96 K/UL — LOW (ref 1–3.3)
LYMPHOCYTES # BLD AUTO: 1.05 K/UL — SIGNIFICANT CHANGE UP (ref 1–3.3)
LYMPHOCYTES # BLD AUTO: 1.15 K/UL — SIGNIFICANT CHANGE UP (ref 1–3.3)
LYMPHOCYTES # BLD AUTO: 1.15 K/UL — SIGNIFICANT CHANGE UP (ref 1–3.3)
LYMPHOCYTES # BLD AUTO: 10.8 % — LOW (ref 13–44)
LYMPHOCYTES # BLD AUTO: 10.8 % — LOW (ref 13–44)
LYMPHOCYTES # BLD AUTO: 13 % — SIGNIFICANT CHANGE UP (ref 13–44)
LYMPHOCYTES # BLD AUTO: 6.3 % — LOW (ref 13–44)
LYMPHOCYTES # FLD: 4 % — SIGNIFICANT CHANGE UP
MAGNESIUM SERPL-MCNC: 1.5 MG/DL — LOW (ref 1.6–2.6)
MAGNESIUM SERPL-MCNC: 1.5 MG/DL — LOW (ref 1.6–2.6)
MAGNESIUM SERPL-MCNC: 1.6 MG/DL — SIGNIFICANT CHANGE UP (ref 1.6–2.6)
MAGNESIUM SERPL-MCNC: 1.7 MG/DL — SIGNIFICANT CHANGE UP (ref 1.6–2.6)
MAGNESIUM SERPL-MCNC: 1.8 MG/DL — SIGNIFICANT CHANGE UP (ref 1.6–2.6)
MAGNESIUM SERPL-MCNC: 1.9 MG/DL — SIGNIFICANT CHANGE UP (ref 1.6–2.6)
MAGNESIUM SERPL-MCNC: 1.9 MG/DL — SIGNIFICANT CHANGE UP (ref 1.6–2.6)
MCHC RBC-ENTMCNC: 27.8 PG — SIGNIFICANT CHANGE UP (ref 27–34)
MCHC RBC-ENTMCNC: 27.9 PG — SIGNIFICANT CHANGE UP (ref 27–34)
MCHC RBC-ENTMCNC: 27.9 PG — SIGNIFICANT CHANGE UP (ref 27–34)
MCHC RBC-ENTMCNC: 28 PG — SIGNIFICANT CHANGE UP (ref 27–34)
MCHC RBC-ENTMCNC: 28.2 PG — SIGNIFICANT CHANGE UP (ref 27–34)
MCHC RBC-ENTMCNC: 28.3 PG — SIGNIFICANT CHANGE UP (ref 27–34)
MCHC RBC-ENTMCNC: 28.5 PG — SIGNIFICANT CHANGE UP (ref 27–34)
MCHC RBC-ENTMCNC: 28.6 PG — SIGNIFICANT CHANGE UP (ref 27–34)
MCHC RBC-ENTMCNC: 28.7 PG — SIGNIFICANT CHANGE UP (ref 27–34)
MCHC RBC-ENTMCNC: 31.6 GM/DL — LOW (ref 32–36)
MCHC RBC-ENTMCNC: 32.5 GM/DL — SIGNIFICANT CHANGE UP (ref 32–36)
MCHC RBC-ENTMCNC: 32.8 GM/DL — SIGNIFICANT CHANGE UP (ref 32–36)
MCHC RBC-ENTMCNC: 32.9 G/DL — SIGNIFICANT CHANGE UP (ref 32–36)
MCHC RBC-ENTMCNC: 33.1 GM/DL — SIGNIFICANT CHANGE UP (ref 32–36)
MCHC RBC-ENTMCNC: 33.2 GM/DL — SIGNIFICANT CHANGE UP (ref 32–36)
MCHC RBC-ENTMCNC: 33.2 GM/DL — SIGNIFICANT CHANGE UP (ref 32–36)
MCHC RBC-ENTMCNC: 33.7 GM/DL — SIGNIFICANT CHANGE UP (ref 32–36)
MCHC RBC-ENTMCNC: 33.8 GM/DL — SIGNIFICANT CHANGE UP (ref 32–36)
MCHC RBC-ENTMCNC: 33.9 GM/DL — SIGNIFICANT CHANGE UP (ref 32–36)
MCHC RBC-ENTMCNC: 34.2 GM/DL — SIGNIFICANT CHANGE UP (ref 32–36)
MCHC RBC-ENTMCNC: 34.5 GM/DL — SIGNIFICANT CHANGE UP (ref 32–36)
MCHC RBC-ENTMCNC: 34.5 GM/DL — SIGNIFICANT CHANGE UP (ref 32–36)
MCV RBC AUTO: 82.5 FL — SIGNIFICANT CHANGE UP (ref 80–100)
MCV RBC AUTO: 82.6 FL — SIGNIFICANT CHANGE UP (ref 80–100)
MCV RBC AUTO: 82.6 FL — SIGNIFICANT CHANGE UP (ref 80–100)
MCV RBC AUTO: 82.7 FL — SIGNIFICANT CHANGE UP (ref 80–100)
MCV RBC AUTO: 82.8 FL — SIGNIFICANT CHANGE UP (ref 80–100)
MCV RBC AUTO: 83.9 FL — SIGNIFICANT CHANGE UP (ref 80–100)
MCV RBC AUTO: 84.2 FL — SIGNIFICANT CHANGE UP (ref 80–100)
MCV RBC AUTO: 85.1 FL — SIGNIFICANT CHANGE UP (ref 80–100)
MCV RBC AUTO: 85.5 FL — SIGNIFICANT CHANGE UP (ref 80–100)
MCV RBC AUTO: 85.9 FL — SIGNIFICANT CHANGE UP (ref 80–100)
MCV RBC AUTO: 86 FL — SIGNIFICANT CHANGE UP (ref 80–100)
MCV RBC AUTO: 87 FL — SIGNIFICANT CHANGE UP (ref 80–100)
MCV RBC AUTO: 90.9 FL — SIGNIFICANT CHANGE UP (ref 80–100)
MESOTHL CELL # FLD: 1 % — SIGNIFICANT CHANGE UP
MONOCYTES # BLD AUTO: 0.48 K/UL — SIGNIFICANT CHANGE UP (ref 0–0.9)
MONOCYTES # BLD AUTO: 0.85 K/UL — SIGNIFICANT CHANGE UP (ref 0–0.9)
MONOCYTES # BLD AUTO: 0.92 K/UL — HIGH (ref 0–0.9)
MONOCYTES # BLD AUTO: 0.98 K/UL — HIGH (ref 0–0.9)
MONOCYTES NFR BLD AUTO: 11.1 % — SIGNIFICANT CHANGE UP (ref 2–14)
MONOCYTES NFR BLD AUTO: 3.2 % — SIGNIFICANT CHANGE UP (ref 2–14)
MONOCYTES NFR BLD AUTO: 8.6 % — SIGNIFICANT CHANGE UP (ref 2–14)
MONOCYTES NFR BLD AUTO: 8.7 % — SIGNIFICANT CHANGE UP (ref 2–14)
MONOS+MACROS # FLD: 12 % — SIGNIFICANT CHANGE UP
NEUTROPHILS # BLD AUTO: 13.54 K/UL — HIGH (ref 1.8–7.4)
NEUTROPHILS # BLD AUTO: 6.16 K/UL — SIGNIFICANT CHANGE UP (ref 1.8–7.4)
NEUTROPHILS # BLD AUTO: 7.35 K/UL — SIGNIFICANT CHANGE UP (ref 1.8–7.4)
NEUTROPHILS # BLD AUTO: 8.24 K/UL — HIGH (ref 1.8–7.4)
NEUTROPHILS NFR BLD AUTO: 69.8 % — SIGNIFICANT CHANGE UP (ref 43–77)
NEUTROPHILS NFR BLD AUTO: 75.5 % — SIGNIFICANT CHANGE UP (ref 43–77)
NEUTROPHILS NFR BLD AUTO: 77 % — SIGNIFICANT CHANGE UP (ref 43–77)
NEUTROPHILS NFR BLD AUTO: 88.9 % — HIGH (ref 43–77)
NEUTROPHILS-BODY FLUID: 82 % — SIGNIFICANT CHANGE UP
NRBC # BLD: 0 /100 WBCS — SIGNIFICANT CHANGE UP (ref 0–0)
NRBC # FLD: 0 K/UL — SIGNIFICANT CHANGE UP (ref 0–0)
NT-PROBNP SERPL-SCNC: 546 PG/ML — HIGH
PCO2 BLDV: 39 MMHG — LOW (ref 42–55)
PH BLDV: 7.32 — SIGNIFICANT CHANGE UP (ref 7.32–7.43)
PHOSPHATE SERPL-MCNC: 2.6 MG/DL — SIGNIFICANT CHANGE UP (ref 2.5–4.5)
PHOSPHATE SERPL-MCNC: 2.7 MG/DL — SIGNIFICANT CHANGE UP (ref 2.5–4.5)
PHOSPHATE SERPL-MCNC: 3 MG/DL — SIGNIFICANT CHANGE UP (ref 2.5–4.5)
PHOSPHATE SERPL-MCNC: 3 MG/DL — SIGNIFICANT CHANGE UP (ref 2.5–4.5)
PHOSPHATE SERPL-MCNC: 3.1 MG/DL — SIGNIFICANT CHANGE UP (ref 2.5–4.5)
PHOSPHATE SERPL-MCNC: 3.2 MG/DL — SIGNIFICANT CHANGE UP (ref 2.5–4.5)
PHOSPHATE SERPL-MCNC: 3.2 MG/DL — SIGNIFICANT CHANGE UP (ref 2.5–4.5)
PHOSPHATE SERPL-MCNC: 3.3 MG/DL — SIGNIFICANT CHANGE UP (ref 2.5–4.5)
PHOSPHATE SERPL-MCNC: 3.4 MG/DL — SIGNIFICANT CHANGE UP (ref 2.5–4.5)
PHOSPHATE SERPL-MCNC: 3.5 MG/DL — SIGNIFICANT CHANGE UP (ref 2.5–4.5)
PHOSPHATE SERPL-MCNC: 3.6 MG/DL — SIGNIFICANT CHANGE UP (ref 2.5–4.5)
PHOSPHATE SERPL-MCNC: 3.6 MG/DL — SIGNIFICANT CHANGE UP (ref 2.5–4.5)
PLATELET # BLD AUTO: 144 K/UL — LOW (ref 150–400)
PLATELET # BLD AUTO: 160 K/UL — SIGNIFICANT CHANGE UP (ref 150–400)
PLATELET # BLD AUTO: 168 K/UL — SIGNIFICANT CHANGE UP (ref 150–400)
PLATELET # BLD AUTO: 171 K/UL — SIGNIFICANT CHANGE UP (ref 150–400)
PLATELET # BLD AUTO: 181 K/UL — SIGNIFICANT CHANGE UP (ref 150–400)
PLATELET # BLD AUTO: 199 K/UL — SIGNIFICANT CHANGE UP (ref 150–400)
PLATELET # BLD AUTO: 208 K/UL — SIGNIFICANT CHANGE UP (ref 150–400)
PLATELET # BLD AUTO: 209 K/UL — SIGNIFICANT CHANGE UP (ref 150–400)
PLATELET # BLD AUTO: 209 K/UL — SIGNIFICANT CHANGE UP (ref 150–400)
PLATELET # BLD AUTO: 218 K/UL — SIGNIFICANT CHANGE UP (ref 150–400)
PLATELET # BLD AUTO: 219 K/UL — SIGNIFICANT CHANGE UP (ref 150–400)
PLATELET # BLD AUTO: 267 K/UL — SIGNIFICANT CHANGE UP (ref 150–400)
PLATELET # BLD AUTO: 309 K/UL — SIGNIFICANT CHANGE UP (ref 150–400)
PO2 BLDV: 40 MMHG — SIGNIFICANT CHANGE UP
POTASSIUM BLDV-SCNC: 4.3 MMOL/L — SIGNIFICANT CHANGE UP (ref 3.5–5.1)
POTASSIUM SERPL-MCNC: 4.3 MMOL/L — SIGNIFICANT CHANGE UP (ref 3.5–5.3)
POTASSIUM SERPL-MCNC: 4.4 MMOL/L — SIGNIFICANT CHANGE UP (ref 3.5–5.3)
POTASSIUM SERPL-MCNC: 4.4 MMOL/L — SIGNIFICANT CHANGE UP (ref 3.5–5.3)
POTASSIUM SERPL-MCNC: 4.5 MMOL/L — SIGNIFICANT CHANGE UP (ref 3.5–5.3)
POTASSIUM SERPL-MCNC: 4.6 MMOL/L — SIGNIFICANT CHANGE UP (ref 3.5–5.3)
POTASSIUM SERPL-MCNC: 4.6 MMOL/L — SIGNIFICANT CHANGE UP (ref 3.5–5.3)
POTASSIUM SERPL-MCNC: 4.7 MMOL/L — SIGNIFICANT CHANGE UP (ref 3.5–5.3)
POTASSIUM SERPL-MCNC: 4.9 MMOL/L — SIGNIFICANT CHANGE UP (ref 3.5–5.3)
POTASSIUM SERPL-MCNC: 5 MMOL/L — SIGNIFICANT CHANGE UP (ref 3.5–5.3)
POTASSIUM SERPL-MCNC: 5 MMOL/L — SIGNIFICANT CHANGE UP (ref 3.5–5.3)
POTASSIUM SERPL-MCNC: 7.5 MMOL/L — CRITICAL HIGH (ref 3.5–5.3)
POTASSIUM SERPL-SCNC: 4.3 MMOL/L — SIGNIFICANT CHANGE UP (ref 3.5–5.3)
POTASSIUM SERPL-SCNC: 4.4 MMOL/L — SIGNIFICANT CHANGE UP (ref 3.5–5.3)
POTASSIUM SERPL-SCNC: 4.4 MMOL/L — SIGNIFICANT CHANGE UP (ref 3.5–5.3)
POTASSIUM SERPL-SCNC: 4.5 MMOL/L — SIGNIFICANT CHANGE UP (ref 3.5–5.3)
POTASSIUM SERPL-SCNC: 4.6 MMOL/L — SIGNIFICANT CHANGE UP (ref 3.5–5.3)
POTASSIUM SERPL-SCNC: 4.6 MMOL/L — SIGNIFICANT CHANGE UP (ref 3.5–5.3)
POTASSIUM SERPL-SCNC: 4.7 MMOL/L — SIGNIFICANT CHANGE UP (ref 3.5–5.3)
POTASSIUM SERPL-SCNC: 4.9 MMOL/L — SIGNIFICANT CHANGE UP (ref 3.5–5.3)
POTASSIUM SERPL-SCNC: 5 MMOL/L — SIGNIFICANT CHANGE UP (ref 3.5–5.3)
POTASSIUM SERPL-SCNC: 5 MMOL/L — SIGNIFICANT CHANGE UP (ref 3.5–5.3)
POTASSIUM SERPL-SCNC: 7.5 MMOL/L — CRITICAL HIGH (ref 3.5–5.3)
PROCALCITONIN SERPL-MCNC: 0.04 NG/ML — SIGNIFICANT CHANGE UP (ref 0.02–0.1)
PROT FLD-MCNC: 3.4 G/DL — SIGNIFICANT CHANGE UP
PROT SERPL-MCNC: 6.7 G/DL — SIGNIFICANT CHANGE UP (ref 6–8.3)
PROT SERPL-MCNC: 6.7 G/DL — SIGNIFICANT CHANGE UP (ref 6–8.3)
PROT SERPL-MCNC: 6.9 G/DL — SIGNIFICANT CHANGE UP (ref 6–8.3)
PROT SERPL-MCNC: 6.9 G/DL — SIGNIFICANT CHANGE UP (ref 6–8.3)
PROT SERPL-MCNC: 7.1 G/DL — SIGNIFICANT CHANGE UP (ref 6–8.3)
PROTHROM AB SERPL-ACNC: 11.6 SEC — SIGNIFICANT CHANGE UP (ref 10.5–13.4)
PROTHROM AB SERPL-ACNC: 12.2 SEC — SIGNIFICANT CHANGE UP (ref 10.5–13.4)
PROTHROM AB SERPL-ACNC: 12.5 SEC — SIGNIFICANT CHANGE UP (ref 10.5–13.4)
RBC # BLD: 3.9 M/UL — LOW (ref 4.2–5.8)
RBC # BLD: 4 M/UL — LOW (ref 4.2–5.8)
RBC # BLD: 4.24 M/UL — SIGNIFICANT CHANGE UP (ref 4.2–5.8)
RBC # BLD: 4.3 M/UL — SIGNIFICANT CHANGE UP (ref 4.2–5.8)
RBC # BLD: 4.31 M/UL — SIGNIFICANT CHANGE UP (ref 4.2–5.8)
RBC # BLD: 4.38 M/UL — SIGNIFICANT CHANGE UP (ref 4.2–5.8)
RBC # BLD: 4.39 M/UL — SIGNIFICANT CHANGE UP (ref 4.2–5.8)
RBC # BLD: 4.42 M/UL — SIGNIFICANT CHANGE UP (ref 4.2–5.8)
RBC # BLD: 4.45 M/UL — SIGNIFICANT CHANGE UP (ref 4.2–5.8)
RBC # BLD: 4.56 M/UL — SIGNIFICANT CHANGE UP (ref 4.2–5.8)
RBC # BLD: 4.61 M/UL — SIGNIFICANT CHANGE UP (ref 4.2–5.8)
RBC # BLD: 4.67 M/UL — SIGNIFICANT CHANGE UP (ref 4.2–5.8)
RBC # BLD: 4.95 M/UL — SIGNIFICANT CHANGE UP (ref 4.2–5.8)
RBC # FLD: 13.3 % — SIGNIFICANT CHANGE UP (ref 10.3–14.5)
RBC # FLD: 13.5 % — SIGNIFICANT CHANGE UP (ref 10.3–14.5)
RBC # FLD: 13.7 % — SIGNIFICANT CHANGE UP (ref 10.3–14.5)
RBC # FLD: 13.9 % — SIGNIFICANT CHANGE UP (ref 10.3–14.5)
RBC # FLD: 13.9 % — SIGNIFICANT CHANGE UP (ref 10.3–14.5)
RBC # FLD: 14.1 % — SIGNIFICANT CHANGE UP (ref 10.3–14.5)
RBC # FLD: 14.1 % — SIGNIFICANT CHANGE UP (ref 10.3–14.5)
RBC # FLD: 14.6 % — HIGH (ref 10.3–14.5)
RCV VOL RI: HIGH CELLS/UL (ref 0–5)
RH IG SCN BLD-IMP: POSITIVE — SIGNIFICANT CHANGE UP
RSV RNA NPH QL NAA+NON-PROBE: SIGNIFICANT CHANGE UP
SAO2 % BLDV: 60.4 % — SIGNIFICANT CHANGE UP
SARS-COV-2 RNA SPEC QL NAA+PROBE: SIGNIFICANT CHANGE UP
SARS-COV-2 RNA SPEC QL NAA+PROBE: SIGNIFICANT CHANGE UP
SODIUM SERPL-SCNC: 133 MMOL/L — LOW (ref 135–145)
SODIUM SERPL-SCNC: 134 MMOL/L — LOW (ref 135–145)
SODIUM SERPL-SCNC: 135 MMOL/L — SIGNIFICANT CHANGE UP (ref 135–145)
SODIUM SERPL-SCNC: 135 MMOL/L — SIGNIFICANT CHANGE UP (ref 135–145)
SODIUM SERPL-SCNC: 137 MMOL/L — SIGNIFICANT CHANGE UP (ref 135–145)
SODIUM SERPL-SCNC: 138 MMOL/L — SIGNIFICANT CHANGE UP (ref 135–145)
SPECIMEN SOURCE: SIGNIFICANT CHANGE UP
T4 FREE+ TSH PNL SERPL: 1.35 UIU/ML — SIGNIFICANT CHANGE UP (ref 0.27–4.2)
T4 FREE+ TSH PNL SERPL: 2.34 UIU/ML — SIGNIFICANT CHANGE UP (ref 0.27–4.2)
TOTAL NUCLEATED CELL COUNT, BODY FLUID: 6692 CELLS/UL — HIGH (ref 0–5)
TROPONIN T, HIGH SENSITIVITY RESULT: 29 NG/L — SIGNIFICANT CHANGE UP
TROPONIN T, HIGH SENSITIVITY RESULT: 36 NG/L — SIGNIFICANT CHANGE UP
TUBE TYPE: SIGNIFICANT CHANGE UP
WBC # BLD: 10.66 K/UL — HIGH (ref 3.8–10.5)
WBC # BLD: 10.69 K/UL — HIGH (ref 3.8–10.5)
WBC # BLD: 11.44 K/UL — HIGH (ref 3.8–10.5)
WBC # BLD: 12.45 K/UL — HIGH (ref 3.8–10.5)
WBC # BLD: 15.21 K/UL — HIGH (ref 3.8–10.5)
WBC # BLD: 8.42 K/UL — SIGNIFICANT CHANGE UP (ref 3.8–10.5)
WBC # BLD: 8.45 K/UL — SIGNIFICANT CHANGE UP (ref 3.8–10.5)
WBC # BLD: 8.64 K/UL — SIGNIFICANT CHANGE UP (ref 3.8–10.5)
WBC # BLD: 8.69 K/UL — SIGNIFICANT CHANGE UP (ref 3.8–10.5)
WBC # BLD: 8.83 K/UL — SIGNIFICANT CHANGE UP (ref 3.8–10.5)
WBC # BLD: 8.93 K/UL — SIGNIFICANT CHANGE UP (ref 3.8–10.5)
WBC # BLD: 9.22 K/UL — SIGNIFICANT CHANGE UP (ref 3.8–10.5)
WBC # BLD: 9.74 K/UL — SIGNIFICANT CHANGE UP (ref 3.8–10.5)
WBC # FLD AUTO: 10.66 K/UL — HIGH (ref 3.8–10.5)
WBC # FLD AUTO: 10.69 K/UL — HIGH (ref 3.8–10.5)
WBC # FLD AUTO: 11.44 K/UL — HIGH (ref 3.8–10.5)
WBC # FLD AUTO: 12.45 K/UL — HIGH (ref 3.8–10.5)
WBC # FLD AUTO: 15.21 K/UL — HIGH (ref 3.8–10.5)
WBC # FLD AUTO: 8.42 K/UL — SIGNIFICANT CHANGE UP (ref 3.8–10.5)
WBC # FLD AUTO: 8.45 K/UL — SIGNIFICANT CHANGE UP (ref 3.8–10.5)
WBC # FLD AUTO: 8.64 K/UL — SIGNIFICANT CHANGE UP (ref 3.8–10.5)
WBC # FLD AUTO: 8.69 K/UL — SIGNIFICANT CHANGE UP (ref 3.8–10.5)
WBC # FLD AUTO: 8.83 K/UL — SIGNIFICANT CHANGE UP (ref 3.8–10.5)
WBC # FLD AUTO: 8.93 K/UL — SIGNIFICANT CHANGE UP (ref 3.8–10.5)
WBC # FLD AUTO: 9.22 K/UL — SIGNIFICANT CHANGE UP (ref 3.8–10.5)
WBC # FLD AUTO: 9.74 K/UL — SIGNIFICANT CHANGE UP (ref 3.8–10.5)

## 2023-01-01 PROCEDURE — 99223 1ST HOSP IP/OBS HIGH 75: CPT

## 2023-01-01 PROCEDURE — 99232 SBSQ HOSP IP/OBS MODERATE 35: CPT

## 2023-01-01 PROCEDURE — 70553 MRI BRAIN STEM W/O & W/DYE: CPT | Mod: 26

## 2023-01-01 PROCEDURE — 99233 SBSQ HOSP IP/OBS HIGH 50: CPT | Mod: GC

## 2023-01-01 PROCEDURE — 75989 ABSCESS DRAINAGE UNDER X-RAY: CPT | Mod: 26,GC

## 2023-01-01 PROCEDURE — 78815 PET IMAGE W/CT SKULL-THIGH: CPT

## 2023-01-01 PROCEDURE — 78815 PET IMAGE W/CT SKULL-THIGH: CPT | Mod: 26,PI

## 2023-01-01 PROCEDURE — 99233 SBSQ HOSP IP/OBS HIGH 50: CPT | Mod: GC,25

## 2023-01-01 PROCEDURE — 76604 US EXAM CHEST: CPT | Mod: 26

## 2023-01-01 PROCEDURE — 99214 OFFICE O/P EST MOD 30 MIN: CPT | Mod: 25

## 2023-01-01 PROCEDURE — 71046 X-RAY EXAM CHEST 2 VIEWS: CPT | Mod: 26

## 2023-01-01 PROCEDURE — 99285 EMERGENCY DEPT VISIT HI MDM: CPT

## 2023-01-01 PROCEDURE — 71045 X-RAY EXAM CHEST 1 VIEW: CPT | Mod: 26

## 2023-01-01 PROCEDURE — A9552: CPT

## 2023-01-01 PROCEDURE — 76604 US EXAM CHEST: CPT | Mod: 26,GC

## 2023-01-01 PROCEDURE — 99223 1ST HOSP IP/OBS HIGH 75: CPT | Mod: GC,25

## 2023-01-01 PROCEDURE — 99239 HOSP IP/OBS DSCHRG MGMT >30: CPT

## 2023-01-01 PROCEDURE — 76604 US EXAM CHEST: CPT

## 2023-01-01 PROCEDURE — A9585: CPT

## 2023-01-01 PROCEDURE — 32555 ASPIRATE PLEURA W/ IMAGING: CPT

## 2023-01-01 PROCEDURE — 70553 MRI BRAIN STEM W/O & W/DYE: CPT

## 2023-01-01 PROCEDURE — 99215 OFFICE O/P EST HI 40 MIN: CPT

## 2023-01-01 PROCEDURE — 99232 SBSQ HOSP IP/OBS MODERATE 35: CPT | Mod: GC

## 2023-01-01 PROCEDURE — 32550 INSERT PLEURAL CATH: CPT | Mod: GC

## 2023-01-01 DEVICE — PLEURX CATHETER KIT: Type: IMPLANTABLE DEVICE | Status: FUNCTIONAL

## 2023-01-01 RX ORDER — LANOLIN ALCOHOL/MO/W.PET/CERES
3 CREAM (GRAM) TOPICAL AT BEDTIME
Refills: 0 | Status: DISCONTINUED | OUTPATIENT
Start: 2023-01-01 | End: 2023-01-01

## 2023-01-01 RX ORDER — ONDANSETRON 8 MG/1
4 TABLET, FILM COATED ORAL ONCE
Refills: 0 | Status: DISCONTINUED | OUTPATIENT
Start: 2023-01-01 | End: 2023-01-01

## 2023-01-01 RX ORDER — INSULIN LISPRO 100/ML
VIAL (ML) SUBCUTANEOUS AT BEDTIME
Refills: 0 | Status: DISCONTINUED | OUTPATIENT
Start: 2023-01-01 | End: 2023-01-01

## 2023-01-01 RX ORDER — MAGNESIUM SULFATE 500 MG/ML
1 VIAL (ML) INJECTION ONCE
Refills: 0 | Status: COMPLETED | OUTPATIENT
Start: 2023-01-01 | End: 2023-01-01

## 2023-01-01 RX ORDER — HEPARIN SODIUM 5000 [USP'U]/ML
5000 INJECTION INTRAVENOUS; SUBCUTANEOUS EVERY 12 HOURS
Refills: 0 | Status: DISCONTINUED | OUTPATIENT
Start: 2023-01-01 | End: 2023-01-01

## 2023-01-01 RX ORDER — LISINOPRIL 2.5 MG/1
1 TABLET ORAL
Qty: 0 | Refills: 0 | DISCHARGE

## 2023-01-01 RX ORDER — ASPIRIN/CALCIUM CARB/MAGNESIUM 324 MG
81 TABLET ORAL DAILY
Refills: 0 | Status: DISCONTINUED | OUTPATIENT
Start: 2023-01-01 | End: 2023-01-01

## 2023-01-01 RX ORDER — SODIUM CHLORIDE 9 MG/ML
500 INJECTION INTRAMUSCULAR; INTRAVENOUS; SUBCUTANEOUS
Refills: 0 | Status: COMPLETED | OUTPATIENT
Start: 2023-01-01 | End: 2023-01-01

## 2023-01-01 RX ORDER — INSULIN GLARGINE 100 [IU]/ML
4 INJECTION, SOLUTION SUBCUTANEOUS AT BEDTIME
Refills: 0 | Status: DISCONTINUED | OUTPATIENT
Start: 2023-01-01 | End: 2023-01-01

## 2023-01-01 RX ORDER — INSULIN GLARGINE 100 [IU]/ML
10 INJECTION, SOLUTION SUBCUTANEOUS AT BEDTIME
Refills: 0 | Status: DISCONTINUED | OUTPATIENT
Start: 2023-01-01 | End: 2023-01-01

## 2023-01-01 RX ORDER — INSULIN LISPRO 100/ML
4 VIAL (ML) SUBCUTANEOUS
Refills: 0 | Status: DISCONTINUED | OUTPATIENT
Start: 2023-01-01 | End: 2023-01-01

## 2023-01-01 RX ORDER — DEXTROSE 50 % IN WATER 50 %
15 SYRINGE (ML) INTRAVENOUS ONCE
Refills: 0 | Status: DISCONTINUED | OUTPATIENT
Start: 2023-01-01 | End: 2023-01-01

## 2023-01-01 RX ORDER — LISINOPRIL 2.5 MG/1
30 TABLET ORAL DAILY
Refills: 0 | Status: DISCONTINUED | OUTPATIENT
Start: 2023-01-01 | End: 2023-01-01

## 2023-01-01 RX ORDER — ATORVASTATIN CALCIUM 80 MG/1
1 TABLET, FILM COATED ORAL
Qty: 0 | Refills: 0 | DISCHARGE

## 2023-01-01 RX ORDER — DEXTROSE 50 % IN WATER 50 %
25 SYRINGE (ML) INTRAVENOUS ONCE
Refills: 0 | Status: DISCONTINUED | OUTPATIENT
Start: 2023-01-01 | End: 2023-01-01

## 2023-01-01 RX ORDER — INSULIN LISPRO 100/ML
VIAL (ML) SUBCUTANEOUS
Refills: 0 | Status: DISCONTINUED | OUTPATIENT
Start: 2023-01-01 | End: 2023-01-01

## 2023-01-01 RX ORDER — INSULIN GLARGINE 100 [IU]/ML
14 INJECTION, SOLUTION SUBCUTANEOUS AT BEDTIME
Refills: 0 | Status: DISCONTINUED | OUTPATIENT
Start: 2023-01-01 | End: 2023-01-01

## 2023-01-01 RX ORDER — FENTANYL CITRATE 50 UG/ML
25 INJECTION INTRAVENOUS
Refills: 0 | Status: DISCONTINUED | OUTPATIENT
Start: 2023-01-01 | End: 2023-01-01

## 2023-01-01 RX ORDER — METFORMIN HYDROCHLORIDE 850 MG/1
1 TABLET ORAL
Qty: 60 | Refills: 0
Start: 2023-01-01 | End: 2023-01-01

## 2023-01-01 RX ORDER — INSULIN LISPRO 100/ML
VIAL (ML) SUBCUTANEOUS EVERY 6 HOURS
Refills: 0 | Status: DISCONTINUED | OUTPATIENT
Start: 2023-01-01 | End: 2023-01-01

## 2023-01-01 RX ORDER — PANTOPRAZOLE SODIUM 20 MG/1
40 TABLET, DELAYED RELEASE ORAL
Refills: 0 | Status: DISCONTINUED | OUTPATIENT
Start: 2023-01-01 | End: 2023-01-01

## 2023-01-01 RX ORDER — LISINOPRIL 2.5 MG/1
40 TABLET ORAL DAILY
Refills: 0 | Status: DISCONTINUED | OUTPATIENT
Start: 2023-01-01 | End: 2023-01-01

## 2023-01-01 RX ORDER — GLUCAGON INJECTION, SOLUTION 0.5 MG/.1ML
1 INJECTION, SOLUTION SUBCUTANEOUS ONCE
Refills: 0 | Status: DISCONTINUED | OUTPATIENT
Start: 2023-01-01 | End: 2023-01-01

## 2023-01-01 RX ORDER — SODIUM CHLORIDE 9 MG/ML
1000 INJECTION, SOLUTION INTRAVENOUS
Refills: 0 | Status: DISCONTINUED | OUTPATIENT
Start: 2023-01-01 | End: 2023-01-01

## 2023-01-01 RX ORDER — ASPIRIN/CALCIUM CARB/MAGNESIUM 324 MG
1 TABLET ORAL
Qty: 0 | Refills: 0 | DISCHARGE

## 2023-01-01 RX ORDER — ENOXAPARIN SODIUM 100 MG/ML
40 INJECTION SUBCUTANEOUS EVERY 24 HOURS
Refills: 0 | Status: DISCONTINUED | OUTPATIENT
Start: 2023-01-01 | End: 2023-01-01

## 2023-01-01 RX ORDER — LISINOPRIL 2.5 MG/1
20 TABLET ORAL DAILY
Refills: 0 | Status: DISCONTINUED | OUTPATIENT
Start: 2023-01-01 | End: 2023-01-01

## 2023-01-01 RX ORDER — ENOXAPARIN SODIUM 100 MG/ML
40 INJECTION SUBCUTANEOUS ONCE
Refills: 0 | Status: DISCONTINUED | OUTPATIENT
Start: 2023-01-01 | End: 2023-01-01

## 2023-01-01 RX ORDER — DEXTROSE 50 % IN WATER 50 %
12.5 SYRINGE (ML) INTRAVENOUS ONCE
Refills: 0 | Status: DISCONTINUED | OUTPATIENT
Start: 2023-01-01 | End: 2023-01-01

## 2023-01-01 RX ORDER — AMLODIPINE BESYLATE 2.5 MG/1
1 TABLET ORAL
Qty: 30 | Refills: 0
Start: 2023-01-01 | End: 2023-01-01

## 2023-01-01 RX ORDER — AMLODIPINE BESYLATE 2.5 MG/1
10 TABLET ORAL DAILY
Refills: 0 | Status: DISCONTINUED | OUTPATIENT
Start: 2023-01-01 | End: 2023-01-01

## 2023-01-01 RX ORDER — HEPARIN SODIUM 5000 [USP'U]/ML
5000 INJECTION INTRAVENOUS; SUBCUTANEOUS ONCE
Refills: 0 | Status: COMPLETED | OUTPATIENT
Start: 2023-01-01 | End: 2023-01-01

## 2023-01-01 RX ORDER — LISINOPRIL 2.5 MG/1
1 TABLET ORAL
Qty: 30 | Refills: 0
Start: 2023-01-01 | End: 2023-01-01

## 2023-01-01 RX ORDER — ATORVASTATIN CALCIUM 80 MG/1
20 TABLET, FILM COATED ORAL AT BEDTIME
Refills: 0 | Status: DISCONTINUED | OUTPATIENT
Start: 2023-01-01 | End: 2023-01-01

## 2023-01-01 RX ORDER — ENOXAPARIN SODIUM 100 MG/ML
40 INJECTION SUBCUTANEOUS ONCE
Refills: 0 | Status: COMPLETED | OUTPATIENT
Start: 2023-01-01 | End: 2023-01-01

## 2023-01-01 RX ADMIN — Medication 5: at 13:14

## 2023-01-01 RX ADMIN — AMLODIPINE BESYLATE 10 MILLIGRAM(S): 2.5 TABLET ORAL at 06:22

## 2023-01-01 RX ADMIN — Medication 3 MILLIGRAM(S): at 21:37

## 2023-01-01 RX ADMIN — Medication 6: at 12:36

## 2023-01-01 RX ADMIN — LISINOPRIL 20 MILLIGRAM(S): 2.5 TABLET ORAL at 06:21

## 2023-01-01 RX ADMIN — INSULIN GLARGINE 14 UNIT(S): 100 INJECTION, SOLUTION SUBCUTANEOUS at 22:36

## 2023-01-01 RX ADMIN — HEPARIN SODIUM 5000 UNIT(S): 5000 INJECTION INTRAVENOUS; SUBCUTANEOUS at 05:49

## 2023-01-01 RX ADMIN — PANTOPRAZOLE SODIUM 40 MILLIGRAM(S): 20 TABLET, DELAYED RELEASE ORAL at 06:16

## 2023-01-01 RX ADMIN — LISINOPRIL 30 MILLIGRAM(S): 2.5 TABLET ORAL at 05:43

## 2023-01-01 RX ADMIN — AMLODIPINE BESYLATE 10 MILLIGRAM(S): 2.5 TABLET ORAL at 05:42

## 2023-01-01 RX ADMIN — Medication 2: at 08:35

## 2023-01-01 RX ADMIN — LISINOPRIL 40 MILLIGRAM(S): 2.5 TABLET ORAL at 05:42

## 2023-01-01 RX ADMIN — AMLODIPINE BESYLATE 10 MILLIGRAM(S): 2.5 TABLET ORAL at 05:55

## 2023-01-01 RX ADMIN — ENOXAPARIN SODIUM 40 MILLIGRAM(S): 100 INJECTION SUBCUTANEOUS at 05:28

## 2023-01-01 RX ADMIN — ATORVASTATIN CALCIUM 20 MILLIGRAM(S): 80 TABLET, FILM COATED ORAL at 22:31

## 2023-01-01 RX ADMIN — Medication 81 MILLIGRAM(S): at 13:00

## 2023-01-01 RX ADMIN — Medication 2: at 22:53

## 2023-01-01 RX ADMIN — Medication 3: at 08:26

## 2023-01-01 RX ADMIN — Medication 2: at 21:37

## 2023-01-01 RX ADMIN — AMLODIPINE BESYLATE 10 MILLIGRAM(S): 2.5 TABLET ORAL at 05:12

## 2023-01-01 RX ADMIN — Medication 3 MILLIGRAM(S): at 22:31

## 2023-01-01 RX ADMIN — Medication 2: at 12:43

## 2023-01-01 RX ADMIN — Medication 6: at 12:29

## 2023-01-01 RX ADMIN — Medication 100 GRAM(S): at 12:59

## 2023-01-01 RX ADMIN — Medication 3 MILLIGRAM(S): at 22:37

## 2023-01-01 RX ADMIN — Medication 3: at 22:38

## 2023-01-01 RX ADMIN — Medication 2: at 06:24

## 2023-01-01 RX ADMIN — Medication 4: at 18:02

## 2023-01-01 RX ADMIN — Medication 3 MILLIGRAM(S): at 21:49

## 2023-01-01 RX ADMIN — Medication 81 MILLIGRAM(S): at 12:51

## 2023-01-01 RX ADMIN — ATORVASTATIN CALCIUM 20 MILLIGRAM(S): 80 TABLET, FILM COATED ORAL at 21:37

## 2023-01-01 RX ADMIN — PANTOPRAZOLE SODIUM 40 MILLIGRAM(S): 20 TABLET, DELAYED RELEASE ORAL at 05:27

## 2023-01-01 RX ADMIN — AMLODIPINE BESYLATE 10 MILLIGRAM(S): 2.5 TABLET ORAL at 05:28

## 2023-01-01 RX ADMIN — PANTOPRAZOLE SODIUM 40 MILLIGRAM(S): 20 TABLET, DELAYED RELEASE ORAL at 05:30

## 2023-01-01 RX ADMIN — ATORVASTATIN CALCIUM 20 MILLIGRAM(S): 80 TABLET, FILM COATED ORAL at 22:15

## 2023-01-01 RX ADMIN — Medication 3: at 21:48

## 2023-01-01 RX ADMIN — HEPARIN SODIUM 5000 UNIT(S): 5000 INJECTION INTRAVENOUS; SUBCUTANEOUS at 18:08

## 2023-01-01 RX ADMIN — Medication 1: at 08:57

## 2023-01-01 RX ADMIN — Medication 6: at 08:52

## 2023-01-01 RX ADMIN — ENOXAPARIN SODIUM 40 MILLIGRAM(S): 100 INJECTION SUBCUTANEOUS at 05:29

## 2023-01-01 RX ADMIN — ENOXAPARIN SODIUM 40 MILLIGRAM(S): 100 INJECTION SUBCUTANEOUS at 05:55

## 2023-01-01 RX ADMIN — LISINOPRIL 20 MILLIGRAM(S): 2.5 TABLET ORAL at 06:09

## 2023-01-01 RX ADMIN — Medication 2: at 08:53

## 2023-01-01 RX ADMIN — AMLODIPINE BESYLATE 10 MILLIGRAM(S): 2.5 TABLET ORAL at 05:13

## 2023-01-01 RX ADMIN — Medication 4: at 18:17

## 2023-01-01 RX ADMIN — LISINOPRIL 40 MILLIGRAM(S): 2.5 TABLET ORAL at 05:29

## 2023-01-01 RX ADMIN — ATORVASTATIN CALCIUM 20 MILLIGRAM(S): 80 TABLET, FILM COATED ORAL at 22:53

## 2023-01-01 RX ADMIN — ENOXAPARIN SODIUM 40 MILLIGRAM(S): 100 INJECTION SUBCUTANEOUS at 05:11

## 2023-01-01 RX ADMIN — HEPARIN SODIUM 5000 UNIT(S): 5000 INJECTION INTRAVENOUS; SUBCUTANEOUS at 05:12

## 2023-01-01 RX ADMIN — ENOXAPARIN SODIUM 40 MILLIGRAM(S): 100 INJECTION SUBCUTANEOUS at 05:42

## 2023-01-01 RX ADMIN — Medication 1: at 18:20

## 2023-01-01 RX ADMIN — PANTOPRAZOLE SODIUM 40 MILLIGRAM(S): 20 TABLET, DELAYED RELEASE ORAL at 05:42

## 2023-01-01 RX ADMIN — PANTOPRAZOLE SODIUM 40 MILLIGRAM(S): 20 TABLET, DELAYED RELEASE ORAL at 06:24

## 2023-01-01 RX ADMIN — Medication 2: at 09:13

## 2023-01-01 RX ADMIN — Medication 3 MILLIGRAM(S): at 21:34

## 2023-01-01 RX ADMIN — ATORVASTATIN CALCIUM 20 MILLIGRAM(S): 80 TABLET, FILM COATED ORAL at 22:37

## 2023-01-01 RX ADMIN — LISINOPRIL 20 MILLIGRAM(S): 2.5 TABLET ORAL at 06:37

## 2023-01-01 RX ADMIN — Medication 2: at 18:07

## 2023-01-01 RX ADMIN — Medication 5: at 18:31

## 2023-01-01 RX ADMIN — Medication 8: at 18:19

## 2023-01-01 RX ADMIN — HEPARIN SODIUM 5000 UNIT(S): 5000 INJECTION INTRAVENOUS; SUBCUTANEOUS at 05:29

## 2023-01-01 RX ADMIN — Medication 3 MILLIGRAM(S): at 22:53

## 2023-01-01 RX ADMIN — Medication 4: at 12:36

## 2023-01-01 RX ADMIN — ATORVASTATIN CALCIUM 20 MILLIGRAM(S): 80 TABLET, FILM COATED ORAL at 22:36

## 2023-01-01 RX ADMIN — Medication 81 MILLIGRAM(S): at 13:19

## 2023-01-01 RX ADMIN — AMLODIPINE BESYLATE 10 MILLIGRAM(S): 2.5 TABLET ORAL at 06:36

## 2023-01-01 RX ADMIN — Medication 2: at 13:09

## 2023-01-01 RX ADMIN — ENOXAPARIN SODIUM 40 MILLIGRAM(S): 100 INJECTION SUBCUTANEOUS at 17:55

## 2023-01-01 RX ADMIN — ENOXAPARIN SODIUM 40 MILLIGRAM(S): 100 INJECTION SUBCUTANEOUS at 05:12

## 2023-01-01 RX ADMIN — Medication 4 UNIT(S): at 12:53

## 2023-01-01 RX ADMIN — Medication 1: at 09:12

## 2023-01-01 RX ADMIN — Medication 3 MILLIGRAM(S): at 22:43

## 2023-01-01 RX ADMIN — ATORVASTATIN CALCIUM 20 MILLIGRAM(S): 80 TABLET, FILM COATED ORAL at 21:19

## 2023-01-01 RX ADMIN — Medication 10: at 12:52

## 2023-01-01 RX ADMIN — ATORVASTATIN CALCIUM 20 MILLIGRAM(S): 80 TABLET, FILM COATED ORAL at 21:13

## 2023-01-01 RX ADMIN — Medication 3 MILLIGRAM(S): at 22:16

## 2023-01-01 RX ADMIN — Medication 3: at 13:00

## 2023-01-01 RX ADMIN — Medication 8: at 18:26

## 2023-01-01 RX ADMIN — LISINOPRIL 40 MILLIGRAM(S): 2.5 TABLET ORAL at 05:55

## 2023-01-01 RX ADMIN — LISINOPRIL 40 MILLIGRAM(S): 2.5 TABLET ORAL at 05:12

## 2023-01-01 RX ADMIN — LISINOPRIL 20 MILLIGRAM(S): 2.5 TABLET ORAL at 05:12

## 2023-01-01 RX ADMIN — Medication 6: at 22:38

## 2023-01-01 RX ADMIN — LISINOPRIL 20 MILLIGRAM(S): 2.5 TABLET ORAL at 05:28

## 2023-01-01 RX ADMIN — ATORVASTATIN CALCIUM 20 MILLIGRAM(S): 80 TABLET, FILM COATED ORAL at 21:50

## 2023-01-01 RX ADMIN — AMLODIPINE BESYLATE 10 MILLIGRAM(S): 2.5 TABLET ORAL at 05:30

## 2023-01-01 RX ADMIN — PANTOPRAZOLE SODIUM 40 MILLIGRAM(S): 20 TABLET, DELAYED RELEASE ORAL at 05:28

## 2023-01-01 RX ADMIN — LISINOPRIL 40 MILLIGRAM(S): 2.5 TABLET ORAL at 05:13

## 2023-01-01 RX ADMIN — HEPARIN SODIUM 5000 UNIT(S): 5000 INJECTION INTRAVENOUS; SUBCUTANEOUS at 18:24

## 2023-01-01 RX ADMIN — Medication 2: at 22:49

## 2023-01-01 RX ADMIN — INSULIN GLARGINE 10 UNIT(S): 100 INJECTION, SOLUTION SUBCUTANEOUS at 22:48

## 2023-01-01 RX ADMIN — Medication 2: at 18:13

## 2023-01-01 RX ADMIN — PANTOPRAZOLE SODIUM 40 MILLIGRAM(S): 20 TABLET, DELAYED RELEASE ORAL at 05:55

## 2023-01-01 RX ADMIN — ATORVASTATIN CALCIUM 20 MILLIGRAM(S): 80 TABLET, FILM COATED ORAL at 22:49

## 2023-01-01 RX ADMIN — ATORVASTATIN CALCIUM 20 MILLIGRAM(S): 80 TABLET, FILM COATED ORAL at 21:34

## 2023-01-01 RX ADMIN — LISINOPRIL 40 MILLIGRAM(S): 2.5 TABLET ORAL at 05:28

## 2023-01-01 RX ADMIN — Medication 3 MILLIGRAM(S): at 22:48

## 2023-01-01 RX ADMIN — INSULIN GLARGINE 4 UNIT(S): 100 INJECTION, SOLUTION SUBCUTANEOUS at 22:37

## 2023-01-01 RX ADMIN — INSULIN GLARGINE 14 UNIT(S): 100 INJECTION, SOLUTION SUBCUTANEOUS at 22:31

## 2023-01-01 RX ADMIN — ATORVASTATIN CALCIUM 20 MILLIGRAM(S): 80 TABLET, FILM COATED ORAL at 21:49

## 2023-01-01 RX ADMIN — LISINOPRIL 40 MILLIGRAM(S): 2.5 TABLET ORAL at 05:27

## 2023-01-01 RX ADMIN — Medication 2: at 12:51

## 2023-01-01 RX ADMIN — Medication 1: at 13:18

## 2023-01-01 RX ADMIN — AMLODIPINE BESYLATE 10 MILLIGRAM(S): 2.5 TABLET ORAL at 06:09

## 2023-01-01 RX ADMIN — Medication 2: at 09:00

## 2023-01-01 RX ADMIN — Medication 2: at 12:19

## 2023-01-01 RX ADMIN — SODIUM CHLORIDE 75 MILLILITER(S): 9 INJECTION INTRAMUSCULAR; INTRAVENOUS; SUBCUTANEOUS at 02:18

## 2023-01-01 RX ADMIN — Medication 81 MILLIGRAM(S): at 12:56

## 2023-01-01 RX ADMIN — Medication 5: at 18:29

## 2023-01-01 RX ADMIN — Medication 5: at 12:59

## 2023-01-01 RX ADMIN — AMLODIPINE BESYLATE 10 MILLIGRAM(S): 2.5 TABLET ORAL at 05:43

## 2023-01-01 RX ADMIN — Medication 2: at 09:05

## 2023-01-01 RX ADMIN — Medication 8: at 13:08

## 2023-01-05 NOTE — H&P ADULT - ASSESSMENT
[ x ]  Lab studies personally reviewed  [ x ]  Radiology personally reviewed  [ x ]  Old records personally reviewed    82 year old male, with past history significant for Pleural effusion, Lung cancer Home oxygen use (2 liters), HTN, DM-2, HLD, and Dengue fever, presented to the ED secondary to worsening difficulty in breathing.  Diagnosed with Pleural Effusion in the ED.

## 2023-01-05 NOTE — ED PROVIDER NOTE - PHYSICAL EXAMINATION
Gen: Awake, Alert, WD, WN, NAD  Head:  NC/AT  Eyes:  PERRL, EOMI, Conjunctiva pink, lids normal, no scleral icterus  ENT: OP clear,, moist mucus membranes  Neck: supple, nontender, no meningismus, no JVD, trachea midline  Cardiac/CV:  S1 S2, RRR, no M/G/R  Respiratory/Pulm:  tachypnea, decreased BS in left lung base and apex   Gastrointestinal/Abdomen:  Soft, nontender, nondistended, +BS, no rebound/guarding  Back:  no CVAT, no MLT  Ext:  warm, well perfused, moving all extremities spontaneously, no peripheral edema, distal pulses intact  Skin: intact, no rash  Neuro:  AAOx3, sensation intact, motor 5/5 x 4 extremities, normal gait, speech clear

## 2023-01-05 NOTE — H&P ADULT - HISTORY OF PRESENT ILLNESS
82 year old male, with past history significant for Pleural effusion, Lung cancer Home oxygen use (2 liters), HTN, DM-2, HLD, and Dengue fever, presented to the ED secondary to worsening difficulty in breathing.  Seen and evaluated at bedside;    Vital signs upon ED presentation as follows: BP = 161/82, HR = 91, RR = 30, T = 36.7 C (98 F), O2 Sat = 94% on RA (to 100% on 3L NC).  Diagnosed with Pleural Effusion in the ED. 82 year old male, with past history significant for Pleural effusion, Lung cancer Home oxygen use (2 liters), HTN, DM-2, HLD, and Dengue fever, presented to the ED secondary to worsening difficulty in breathing.  Seen and evaluated at bedside; tachypneic at times to low 30's, but no signs of acute distress.  Via , patient endorses breathing difficulties, including worsening shortness of breath, decreased exercise endurance, and occasional palpitations for the past ~ one week.  Uses 2 pillows nightly.  States feeling as if fluid has accumulated in the lungs.  No chest pain.  No edema.  No coughing.  Per report from family, patient's home oxygen was increased to 3 liters because of increased work of breathing, pronounced over the recent hours.      Vital signs upon ED presentation as follows: BP = 161/82, HR = 91, RR = 30, T = 36.7 C (98 F), O2 Sat = 94% on RA (to 100% on 3L NC).  Diagnosed with Pleural Effusion in the ED.

## 2023-01-05 NOTE — ED ADULT NURSE REASSESSMENT NOTE - NS ED NURSE REASSESS COMMENT FT1
ACP called r/t pt's fingerstick. Pt repeat fingerstick 77 post providing juice and food. Per ACP to contact Dr. Patel as pt is asymptomatic and feels no need to provide any further inteventions at this time.  Dr. Patel aware rpt FS was 77 post intervention from 72. Per order pt to have rpt FS in an hour

## 2023-01-05 NOTE — H&P ADULT - NSHPLABSRESULTS_GEN_ALL_CORE
LAB-WORK/STUDIES:                          12.2   10.69 )-----------( 160      ( 05 Jan 2023 16:57 )             37.5     05 Jan 2023 16:57    138    |  105    |  29     ----------------------------<  77     4.4     |  19     |  0.98     Ca    9.7        05 Jan 2023 16:57    TPro  6.9    /  Alb  3.9    /  TBili  0.5    /  DBili  x      /  AST  13     /  ALT  5      /  AlkPhos  79     05 Jan 2023 16:57    LIVER FUNCTIONS - ( 05 Jan 2023 16:57 )  Alb: 3.9 g/dL / Pro: 6.9 g/dL / ALK PHOS: 79 U/L / ALT: 5 U/L / AST: 13 U/L / GGT: x           PT/INR - ( 05 Jan 2023 19:20 )   PT: 12.5 sec;   INR: 1.08 ratio    PTT - ( 05 Jan 2023 19:20 )  PTT:48.9 sec    CAPILLARY BLOOD GLUCOSE    ======================================================================        ======================================================================  . LAB-WORK/STUDIES:                          12.2   10.69 )-----------( 160      ( 05 Jan 2023 16:57 )             37.5     05 Jan 2023 16:57    138    |  105    |  29     ----------------------------<  77     4.4     |  19     |  0.98     Ca    9.7        05 Jan 2023 16:57    TPro  6.9    /  Alb  3.9    /  TBili  0.5    /  DBili  x      /  AST  13     /  ALT  5      /  AlkPhos  79     05 Jan 2023 16:57    LIVER FUNCTIONS - ( 05 Jan 2023 16:57 )  Alb: 3.9 g/dL / Pro: 6.9 g/dL / ALK PHOS: 79 U/L / ALT: 5 U/L / AST: 13 U/L / GGT: x           PT/INR - ( 05 Jan 2023 19:20 )   PT: 12.5 sec;   INR: 1.08 ratio    PTT - ( 05 Jan 2023 19:20 )  PTT:48.9 sec    CAPILLARY BLOOD GLUCOSE    ======================================================================    ECG = NSR at 91 bpj, QTc = 418, LAD, incomplete RBBB (poor quality ECG)    ======================================================================  .

## 2023-01-05 NOTE — H&P ADULT - PROBLEM SELECTOR PLAN 3
- BUN/Cr ratio approximately 30:1  - in the setting of decreased oral intake, but also due to insensible losses  - pro-BNP = 546  - will give short course of low dose IVF  - f./u for improvement  - f/u electrolytes

## 2023-01-05 NOTE — ED PROVIDER NOTE - CLINICAL SUMMARY MEDICAL DECISION MAKING FREE TEXT BOX
82-year-old male with past medical history of hypertension, hyperlipidemia, diabetes, left upper lung mass with left-sided pleural effusion lung cancer on 2 L nasal cannula presenting with increased work of breathing and hypoxia from home. Patient with left-sided lung mass. Concern for possibility of malignant pleural effusion with worsening hypoxia. Patient denies fever chills. Will obtain CBC, CMP, chest x-ray likely will require noncontrast CT scan of chest thoracentesis by pulmonary. Will evaluate for other possible etiology of hypoxia. Patient placed on increased oxygen with improved respiratory effort. Patient will require admission. We'll check flu/covid

## 2023-01-05 NOTE — H&P ADULT - NSHPSOCIALHISTORY_GEN_ALL_CORE
SOCIAL HISTORY:    Marital Status:  ( x )    (  ) Single        (  )        (  )        (  )   Lives with:         (  ) Alone       ( x ) Spouse     ( x ) Children/Daughter         (  ) Parents              (  ) Other    History of cigarette smoking; 1ppd x ~ 25 years.  Quit in approximately 1992  No history of alcohol abuse  No history of illegal drug use    Occupation: Retired prior to 1992 SOCIAL HISTORY:    Marital Status:  ( x )    (  ) Single        (  )        (  )        (  )   Lives with:         (  ) Alone       ( x ) Spouse     ( x ) Children/Daughter         (  ) Parents              (  ) Other    History of cigarette smoking; 1ppd x ~ 25 years.  Quit in approximately 1992  No history of alcohol abuse  No history of illegal drug use    Occupation: Retired prior to 1992    Ambulation: Uses cane at baseline

## 2023-01-05 NOTE — ED ADULT NURSE NOTE - NSIMPLEMENTINTERV_GEN_ALL_ED
Implemented All Fall with Harm Risk Interventions:  Drifting to call system. Call bell, personal items and telephone within reach. Instruct patient to call for assistance. Room bathroom lighting operational. Non-slip footwear when patient is off stretcher. Physically safe environment: no spills, clutter or unnecessary equipment. Stretcher in lowest position, wheels locked, appropriate side rails in place. Provide visual cue, wrist band, yellow gown, etc. Monitor gait and stability. Monitor for mental status changes and reorient to person, place, and time. Review medications for side effects contributing to fall risk. Reinforce activity limits and safety measures with patient and family. Provide visual clues: red socks.

## 2023-01-05 NOTE — ED PROVIDER NOTE - OBJECTIVE STATEMENT
82-year-old male with past medical history of hypertension, hyperlipidemia, diabetes, left upper lung mass with left-sided pleural effusion lung cancer on 2 L nasal cannula presenting with increased work of breathing and hypoxia from home. Patient's accompanied by son who is providing history. States that he usually just 2 L of nasal cannula has been requiring 3 L of nasal cannula to maintain saturation saturation 94 here in the ED patient with mild tachypnea respiratory rate 28-30 diminished breath sounds on left side. Patient denies fever, chills, nausea, vomiting, chest pain. Patient states that he has had increased work of breathing at home. Patient examined new oxygen canister placed.

## 2023-01-05 NOTE — H&P ADULT - PROBLEM SELECTOR PLAN 4
- with history of cigarette smoking; quit ~ 30 years ago  - pleural effusion likely due to same  - being [facility]  - Oncology consult in the AM (please call) - 10.69  - no signs of gross infection appreciated   - likely due to stress demargination  - f/u trend w/ repeat lab-work

## 2023-01-05 NOTE — ED PROVIDER NOTE - WR ORDER STATUS 2
Pt to discharge home this day. Home health referral updated to [facility]. No additional discharge needs voiced at this time. Milestones met. Care Management Interventions  PCP Verified by CM: Yes(Jose Stoner)  Mode of Transport at Discharge:  Other (see comment)(spouse)  Transition of Care Consult (CM Consult): Discharge Planning, [address]: No  Reason Outside [place]: Physician referred to specific agency  Discharge Durable Medical Equipment: No  Physical Therapy Consult: Yes  Occupational Therapy Consult: No  Speech Therapy Consult: No  Current Support Network: Lives with Spouse, Own Home  Confirm Follow Up Transport: Family  Plan discussed with Pt/Family/Caregiver: Yes  Freedom of Choice Offered: Yes  Discharge Location  Discharge Placement: Home with home health Canceled

## 2023-01-05 NOTE — ED PROVIDER NOTE - NS ED ROS FT
denies fever, chills, chest pain, +SOB, abdominal pain, diarrhea, dysuria, syncope, bleeding, new rash,weakness, numbness, blurred vision    ROS  otherwise negative as per HPI

## 2023-01-05 NOTE — ED ADULT NURSE NOTE - OBJECTIVE STATEMENT
received pt in room 3, 82 yr/o male A+OX4, ambulatory at baseline. PMH of HTN, DM, lung mass, and pleural effusion in the past. pt presented to the ED today, C/O SOB with increase respirations and hypoxia. received pt on 3L NC satting at 93  and respirations at 42, NC increase to 5L NC improved to 98 and 28 respirations. pt denies chest pain, states he is feeling better since increase of O2. right AC 20g in place, labs drawn and sent. pt is resting comfortably in stretcher. will continue to monitor.

## 2023-01-05 NOTE — H&P ADULT - NSICDXPASTMEDICALHX_GEN_ALL_CORE_FT
PAST MEDICAL HISTORY:  Dengue fever     Dyslipidemia     Gait difficulty     Hypertension     Lung cancer     On home oxygen therapy     Pleural effusion, left     Type II diabetes mellitus

## 2023-01-05 NOTE — H&P ADULT - PROBLEM SELECTOR PLAN 5
- glucose = 77  on CMP  - reportedly w/ FS 72 since admission and given juice, sandwich, but w/ repeat FS = 77  - FS Q2H x 2 (please fu)  - will not give Lantus 3 units  - ISS, per FS  - consistent carb diet - with history of cigarette smoking; quit ~ 30 years ago  - pleural effusion likely due to same  - being followed by [facility]  - Oncology consult in the AM (please call)

## 2023-01-05 NOTE — H&P ADULT - NSHPREVIEWOFSYSTEMS_GEN_ALL_CORE
REVIEW OF SYSTEMS:    CONSTITUTIONAL: Fatigue.  No fever, chills or sweating  EYES/ENT: No visual changes.  No dysphagia  NECK: No pain or stiffness  RESPIRATORY: No cough or hemoptysis.  (+) shortness of breath.  Sensation in chest of recurrent fluid in the lungs  CARDIOVASCULAR: (+) palpitations.  No chest pain.  No lower extremity edema  GASTROINTESTINAL: No epigastric or abdominal pain. No nausea, vomiting or hematemesis.  No diarrhea or constipation. No melena or hematochezia.  GENITOURINARY: No dysuria, frequency or hematuria  MUSCULOSKELETAL: No joint pain, swelling, decreased ROM, erythema, warmth  NEUROLOGICAL: No numbness or weakness  PSYCHIATRY: No anxiety, or depression.  SKIN: No itching, burning, rashes, or lesions   All other review of systems is negative unless indicated above.

## 2023-01-05 NOTE — H&P ADULT - NSHPPHYSICALEXAM_GEN_ALL_CORE
Vital Signs Last 24 Hrs  T(C): 36.8 (05 Jan 2023 20:16), Max: 36.8 (05 Jan 2023 15:55)  T(F): 98.2 (05 Jan 2023 20:16), Max: 98.2 (05 Jan 2023 15:55)  HR: 77 (05 Jan 2023 20:16) (77 - 91)  BP: 145/77 (05 Jan 2023 20:16) (132/59 - 161/82)  BP(mean): --  RR: 18 (05 Jan 2023 20:16) (18 - 30)  SpO2: 100% (05 Jan 2023 20:16) (94% - 100%)    Parameters below as of 05 Jan 2023 20:16  Patient On (Oxygen Delivery Method): room air    ========================================================================================== Vital Signs Last 24 Hrs  T(C): 36.8 (05 Jan 2023 20:16), Max: 36.8 (05 Jan 2023 15:55)  T(F): 98.2 (05 Jan 2023 20:16), Max: 98.2 (05 Jan 2023 15:55)  HR: 77 (05 Jan 2023 20:16) (77 - 91)  BP: 145/77 (05 Jan 2023 20:16) (132/59 - 161/82)  BP(mean): --  RR: 18 (05 Jan 2023 20:16) (18 - 30)  SpO2: 100% (05 Jan 2023 20:16) (94% - 100%)    Parameters below as of 05 Jan 2023 20:16  Patient On (Oxygen Delivery Method): room air    ==========================================================================================  PHYSICAL EXAMINATION:    APPEARANCE: Adequately groomed, adequately nourished male, lying propped up in stretcher.  Tachypneic to low 30's, but in NAD  HEENT: Mildly dry oral mucosa.  Arcus senilis B/L.  Pupils reactive.  EOMI  LYMPHATIC: No lymphadenopathy appreciated  CARDIOVASCULAR: (+) S1 S2.  No JVD.  No murmurs.  No edema  RESPIRATORY: Decreased/Absent breath sounds on the left, especially at the mid and lower left areas.  No wheezing, rhonchi, crackles appreciated  GASTROINTESTINAL: Soft.  Non-tender.  (+) BS  GENITOURINARY: No suprapubic tenderness.  No CVA tenderness B/L  EXTREMITIES: Normal range of motion.  No clubbing, cyanosis or edema  MUSCULOSKELETAL: No atrophy.  No asymmetry.  Good ROM  SKIN: No rashes. No ecchymoses.  No cyanosis  PSYCHIATRIC: A&O x 3.  Mood & affect appropriate to situation  NEUROLOGICAL: Non-focal, KELLEY x 4 against gravity  VASCULAR: Peripheral pulses palpable

## 2023-01-05 NOTE — ED PROVIDER NOTE - ATTENDING CONTRIBUTION TO CARE
attending wrote note  Dr. Tabor: I have personally seen and examined this patient at the bedside. I have fully participated in the care of this patient. I have reviewed all pertinent clinical information, including history, physical exam, plan and the Resident's note and agree except as noted. HPI above as by me. PE above as by me. DDX PLAN

## 2023-01-05 NOTE — H&P ADULT - PROBLEM SELECTOR PLAN 2
- causing JERNIGAN, shortness of breath  - with history of lung cancer; CXR w/ TAVON mass  - underwent thoracentesis on 12/12/2022; pleural fluid w/o growth  - CXR = "Moderate left pleural effusion with associated passive atelectasis."  - Pulmonology consult in the AM (please call)  - HOB elevation  - continues on supplemental oxygen via nasal cannula  - follow pulse oximetry

## 2023-01-05 NOTE — H&P ADULT - PROBLEM SELECTOR PLAN 1
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  - no suspicion for infection at present (and COVID-19 PCR and RVP negative)  - on supplemental oxygen at home and was increased from 2 liters to 3 liters  - tachypneic at rest to low 30's at bedside  - on supplemental oxygen via nasal cannula  - underwent thoracentesis in the past (12/12/2022); will likely need repeat therapy  - Pulmonology consult in the AM Shaun Bhagat DO/Racquel Gayle MD (please call)  - HOB elevation  - follow pulse oximetry 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

## 2023-01-06 NOTE — PROGRESS NOTE ADULT - PROBLEM SELECTOR PROBLEM 5
SEVERITY:- ABNORMAL ECG -

SINUS TACHYCARDIA

PROBABLE LEFT VENTRICULAR HYPERTROPHY

BORDERLINE T ABNORMALITIES, INFERIOR LEADS

:

Confirmed by: Eyal Ryan 24-Mar-2018 21:44:16 Lung cancer Type 2 diabetes mellitus treated with insulin

## 2023-01-06 NOTE — PROGRESS NOTE ADULT - PROBLEM SELECTOR PLAN 4
- 10.69  - no signs of gross infection appreciated   - likely due to stress demargination  - f/u trend w/ repeat lab-work - with 25 pack yr history of cigarette smoking; quit ~ 30 years ago  - pleural effusion likely due to same  - followed by [facility]    Plan  - Oncology consulted, no plan for chemotherapy while inpatient

## 2023-01-06 NOTE — PATIENT PROFILE ADULT - FALL HARM RISK - HARM RISK INTERVENTIONS
Assistance with ambulation/Assistance OOB with selected safe patient handling equipment/Communicate Risk of Fall with Harm to all staff/Discuss with provider need for PT consult/Monitor for mental status changes/Monitor gait and stability/Provide patient with walking aids - walker, cane, crutches/Reinforce activity limits and safety measures with patient and family/Reorient to person, place and time as needed/Review medications for side effects contributing to fall risk/Sit up slowly, dangle for a short time, stand at bedside before walking/Tailored Fall Risk Interventions/Visual Cue: Yellow wristband and red socks/Bed in lowest position, wheels locked, appropriate side rails in place/Call bell, personal items and telephone in reach/Instruct patient to call for assistance before getting out of bed or chair/Non-slip footwear when patient is out of bed/[place] to call system/Physically safe environment - no spills, clutter or unnecessary equipment/Purposeful Proactive Rounding/Room/bathroom lighting operational, light cord in reach

## 2023-01-06 NOTE — PROGRESS NOTE ADULT - ASSESSMENT
[ x ]  Lab studies personally reviewed  [ x ]  Radiology personally reviewed  [ x ]  Old records personally reviewed    82 year old male, with past history significant for Pleural effusion, Lung cancer Home oxygen use (2 liters), HTN, DM-2, HLD, and Dengue fever, presented to the ED secondary to worsening difficulty in breathing.  Diagnosed with Pleural Effusion in the ED. 82 year old M with PMH of lung adenocarcinoma with home oxygen (2L) and recurrent pleural effusions, recent admission from 12/7-15 for large L pleural effusion s/p thoracentesis, T2DM, HTN, HLD, and dengue fever who presented to the ED secondary to worsening difficulty in breathing. CXR showed known TAVON mass and moderate left sided pleural effusion. Patient with no signs of infection (afebrile, no cough, no consolidation) or heart failure (TTE in Dec with EF 62%). He is at increased risk for PE due to malignancy, but no tachycardia or tachypnea noted now, Wells score for PE 1 (low risk). Dyspnea likely due to exudative pleural effusion in setting of lung adenocarcinoma. Will consult Pulmonology for possible thoracentesis and Oncology for management of lung cancer.

## 2023-01-06 NOTE — PROGRESS NOTE ADULT - PROBLEM SELECTOR PLAN 1
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 - worsening dyspnea over one week, but with exacerbation over the hours prior to coming to the ED  - CXR showing left pleural effusion (moderate), known 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  - low suspicion for infection at present (afebrile, no cough, no consolidation on imaging, RVP negative)  - hypercoagulable due to malignancy, but low suspicion for PE at this time due to absence of tachycardia, tahcypnea  - on supplemental oxygen at home and was increased from 2 liters to 3 liters  - tachypneic in ED to 30's, now improved to normal  - currently on 4 L NC with O2 Sat > 95%    Plan  - c/w oxygen support via NC, wean as tolerated  - Pulmonology performed therapeutic thoracentesis today       - exudative by Light's criteria with fluid LDH 1389       - repeat CXR to evaluate effusion after drainage  - HOB elevation, monitor pulse oximetry - worsening dyspnea over one week, tachycardia to 30s but no hypoxia in ED  - CXR showing left pleural effusion (moderate), known 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  - likely due to recurrent plural effusions in setting of NSCLC  - low suspicion for infection (afebrile, no cough, no consolidation on imaging, RVP negative)  - hypercoagulable due to malignancy, but low suspicion for PE at this time due to absence of tachycardia, tachypnea, Wells score for PE 1 (low risk)  - currently on 4 L NC with O2 Sat > 95%    Plan  - c/w oxygen support via NC, wean as tolerated  - Pulmonology performed therapeutic thoracentesis today (900 ml removed)       - exudative by Light's criteria with fluid LDH 1389       - repeat CXR to evaluate effusion after drainage  - HOB elevation, monitor pulse oximetry

## 2023-01-06 NOTE — PATIENT PROFILE ADULT - LANGUAGE ASSISTANCE NEEDED
Patient can make demands in English./No-Patient/Caregiver offered and refused free interpretation services.

## 2023-01-06 NOTE — PROGRESS NOTE ADULT - ATTENDING COMMENTS
82M w/ HTN, HLD, T2DM, recent admission in December 2022 for SOB fth large L sided pleural effusion s/p thoracentesis w/ path showing lung adenoCA not yet started on treatment p/w worsening SOB, likely due to reaccumulation of pleural effusion  -appreciate Pulm eval, bedside thoracentesis done this am  -per Pulm, plan for pleurX cath placement early next week (likely Tuesday)  f/u Heme/Onc recs  PT eval   plan of care d/w son at bedside 82M w/ HTN, HLD, T2DM, recent admission in December 2022 for SOB fth large L sided pleural effusion s/p thoracentesis w/ path showing lung adenoCA not yet started on treatment p/w worsening SOB, likely due to reaccumulation of pleural effusion  -appreciate Pulm eval, bedside thoracentesis done this am, removal of 900cc pleural fluid  -per Pulm, plan for pleurX cath placement early next week (likely Tuesday)  f/u Heme/Onc recs  PT eval   plan of care d/w son at bedside

## 2023-01-06 NOTE — CONSULT NOTE ADULT - ASSESSMENT
81 yo M with PMH T2DM, HTN, HLD, dengue infection, and metastatic lung adeno (dx through thoracentesis) who presents with increasing sob. Found to have R pleural effusion on CXR. Pulmonary consulted for thoracentesis.     # recurrent malignant pleural effusion  - pleural effusion in setting of metastatic lung adenocarcinoma, has not yet started treatment  - last thoracentesis 12/8/22, now presenting with symptomatic increased pleff on 1/6/23  - thoracentesis done today 1/6/23, 800 cc serosanguinous fluid drained  - f/u pleural fluid studies  - pt agreeable to pleurx catheter placement for recurrent malignant effusion   - discussed with interventional pulmonology: scheduled for pleurx catheter 1/11/23    For Pleurx catheter:  - please make NPO on Tuesday 1/10 at midnight  - please hold dvt ppx on tuesday 1/10  - please obtain covid swab on tuesday 1/10  - please obtain AM preop labs on 1/11/23: CBC, CMP, coags, type and screen

## 2023-01-06 NOTE — PROGRESS NOTE ADULT - PROBLEM SELECTOR PLAN 2
- causing JERNIGAN, shortness of breath  - with history of lung cancer; CXR w/ TAVON mass  - underwent thoracentesis on 12/12/2022; pleural fluid w/o growth  - CXR = "Moderate left pleural effusion with associated passive atelectasis."  - Pulmonology consult in the AM (please call)  - HOB elevation  - continues on supplemental oxygen via nasal cannula  - follow pulse oximetry - causing JERNIGAN, shortness of breath  - with history of lung cancer; CXR w/ TAVON mass  - underwent thoracentesis on 12/12/2022; pleural fluid w/o growth  - CXR = "Moderate left pleural effusion with associated passive atelectasis."    Plan  - Pulmonology performed therapeutic thoracentesis today       - exudative by Light's criteria with fluid LDH 1389, likely 2/2 NSCLC       - repeat CXR to evaluate effusion after drainage  - recurrent issue, Pulmonology recommending inpatient pleurx catheter

## 2023-01-06 NOTE — CONSULT NOTE ADULT - ASSESSMENT
82m with recently diagnosed NSCLC with malignant pleural effusion, no brain mets, PET/CT was done 1/3 but report is pending,  presenting with SOB in the setting of Pleff reaccumulation.     NGS with KRAS G12C mutation  PDL1 30%    Discussed treatment options including chemo/IO, IO alone, KRAS inhibitors.   Pt's son Prabhjot to speak to siblings about options     Now s/p Drainage of effusion, SOB improved. Will likely need pleurX, pulm following    -Appreciate pulm input  -Will follow on Monday for discussion of outpatient treatment  -No plan for inpatient chemo or immunotherapy  -Supportive care, pain control, Nutrition, PT, DVT ppx  -Outpatient oncology f/u    Will follow. Please do not hesitate to call back with questions.     Vivi Segovia MD  Medical Oncology Attending  C: [phone]     time spent on direct patient care, interdisciplinary discussions and chart review.

## 2023-01-06 NOTE — PROGRESS NOTE ADULT - PROBLEM SELECTOR PLAN 3
- BUN/Cr ratio approximately 30:1  - in the setting of decreased oral intake, but also due to insensible losses  - pro-BNP = 546  - will give short course of low dose IVF  - f./u for improvement  - f/u electrolytes - BUN/Cr ratio approximately 30:1  - in the setting of decreased oral intake, but also due to insensible losses  - pro-BNP = 546    Plan  - BUN/Cr still >30:1 but now in normal range w/o JAIRO s/p 500 cc IVF  - encourage PO intake, continue to monitor

## 2023-01-06 NOTE — PROGRESS NOTE ADULT - PROBLEM SELECTOR PLAN 5
- with history of cigarette smoking; quit ~ 30 years ago  - pleural effusion likely due to same  - being followed by [facility]  - Oncology consult in the AM (please call) - reportedly w/ FS 72 since admission and given juice, sandwich, but w/ repeat FS = 77  - Hb A1c 5.7, prediabetic    Plan  - FS glu qAC and qHS as pt with recent hypoglycemia  - low dose ISS, can consider discontinuing if FSG in normal range  - consistent carb diet

## 2023-01-06 NOTE — PROGRESS NOTE ADULT - SUBJECTIVE AND OBJECTIVE BOX
Marii Ortiz, PGY1  TEAMS or Pager [phone]     Patient is a 82y old  Male who presents with a chief complaint of Pleural effusion (05 Jan 2023 21:10)      SUBJECTIVE/INTERVAL EVENTS: Patient seen and examined at bedside. Otherwise, denies chest pain, SOB, lightheadedness, nausea, vomiting, constipation, or diarrhea.    MEDICATIONS  (STANDING):  amLODIPine   Tablet 10 milliGRAM(s) Oral daily  aspirin enteric coated 81 milliGRAM(s) Oral daily  atorvastatin 20 milliGRAM(s) Oral at bedtime  dextrose 5%. 1000 milliLiter(s) (50 mL/Hr) IV Continuous <Continuous>  dextrose 5%. 1000 milliLiter(s) (100 mL/Hr) IV Continuous <Continuous>  dextrose 50% Injectable 25 Gram(s) IV Push once  dextrose 50% Injectable 12.5 Gram(s) IV Push once  dextrose 50% Injectable 25 Gram(s) IV Push once  glucagon  Injectable 1 milliGRAM(s) IntraMuscular once  heparin   Injectable 5000 Unit(s) SubCutaneous every 12 hours  insulin lispro (ADMELOG) corrective regimen sliding scale   SubCutaneous three times a day before meals  insulin lispro (ADMELOG) corrective regimen sliding scale   SubCutaneous at bedtime  lisinopril 20 milliGRAM(s) Oral daily    MEDICATIONS  (PRN):  dextrose Oral Gel 15 Gram(s) Oral once PRN Blood Glucose LESS THAN 70 milliGRAM(s)/deciliter      VITAL SIGNS:  T(F): 98.7 (01-06-23 @ 05:10), Max: 98.7 (01-06-23 @ 05:10)  HR: 77 (01-06-23 @ 05:10) (63 - 91)  BP: 139/76 (01-06-23 @ 05:10) (132/59 - 161/82)  RR: 18 (01-06-23 @ 05:10) (18 - 30)  SpO2: 100% (01-06-23 @ 05:10) (94% - 100%)    I&O's Summary    Daily Height in cm: 167.64 (06 Jan 2023 01:28)    Daily     PHYSICAL EXAM:  Gen: Alert, NAD  HEENT: NCAT, conjunctiva clear, sclera anicteric, no erythema or exudates in the oropharynx, mmm  Neck: Supple, no JVD  CV: RRR, S1S2, no m/r/g  Resp: CTAB, normal respiratory effort  Abd: Soft, nontender, nondistended, normal bowel sounds  Ext: no edema, no clubbing or cyanosis  Neuro: AOx3, CN2-12 grossly intact, KELLEY  SKIN: warm, perfused    LABS:                        12.2   10.69 )-----------( 160      ( 05 Jan 2023 16:57 )             37.5     Hgb Trend: 12.2<--  01-05    138  |  105  |  29<H>  ----------------------------<  77  4.4   |  19<L>  |  0.98    Ca    9.7      05 Jan 2023 16:57    TPro  6.9  /  Alb  3.9  /  TBili  0.5  /  DBili  x   /  AST  13  /  ALT  5   /  AlkPhos  79  01-05    Creatinine Trend: 0.98<--, 0.81<--, 0.82<--, 0.89<--, 0.93<--, 0.92<--  LIVER FUNCTIONS - ( 05 Jan 2023 16:57 )  Alb: 3.9 g/dL / Pro: 6.9 g/dL / ALK PHOS: 79 U/L / ALT: 5 U/L / AST: 13 U/L / GGT: x           PT/INR - ( 05 Jan 2023 19:20 )   PT: 12.5 sec;   INR: 1.08 ratio         PTT - ( 05 Jan 2023 19:20 )  PTT:48.9 sec          CAPILLARY BLOOD GLUCOSE      POCT Blood Glucose.: 167 mg/dL (06 Jan 2023 01:21)  POCT Blood Glucose.: 77 mg/dL (05 Jan 2023 23:30)  POCT Blood Glucose.: 72 mg/dL (05 Jan 2023 23:10)      RADIOLOGY & ADDITIONAL TESTS: Reviewed    Imaging Personally Reviewed:    Consultant(s) Notes Reviewed:      Care Discussed with Consultants/Other Providers:   Marii Ortiz, PGY1  TEAMS or Pager [phone]     Patient is a 82y old  Male who presents with a chief complaint of Pleural effusion (05 Jan 2023 21:10)    [place]  Jesus #[id number]    SUBJECTIVE/INTERVAL EVENTS: NAOE. Patient seen and examined at bedside. He reports dyspnea with ambulation but not while at rest. He states that oxygen via NC was helpful for dyspnea. He is requesting a thoracentesis to remove fluid and states that it was helpful for him two weeks ago when he was first diagnosed with lung cancer. He has not started any treatment for cancer and was pending PET scan. He ambulates with a cane at baseline. He lives with his daughter. He has been tolerating PO intake and denies N/V/D/C. Otherwise, denies chest pain, lightheadedness, nausea, vomiting, constipation, or diarrhea.    MEDICATIONS  (STANDING):  amLODIPine   Tablet 10 milliGRAM(s) Oral daily  aspirin enteric coated 81 milliGRAM(s) Oral daily  atorvastatin 20 milliGRAM(s) Oral at bedtime  dextrose 5%. 1000 milliLiter(s) (50 mL/Hr) IV Continuous <Continuous>  dextrose 5%. 1000 milliLiter(s) (100 mL/Hr) IV Continuous <Continuous>  dextrose 50% Injectable 25 Gram(s) IV Push once  dextrose 50% Injectable 12.5 Gram(s) IV Push once  dextrose 50% Injectable 25 Gram(s) IV Push once  glucagon  Injectable 1 milliGRAM(s) IntraMuscular once  heparin   Injectable 5000 Unit(s) SubCutaneous every 12 hours  insulin lispro (ADMELOG) corrective regimen sliding scale   SubCutaneous three times a day before meals  insulin lispro (ADMELOG) corrective regimen sliding scale   SubCutaneous at bedtime  lisinopril 20 milliGRAM(s) Oral daily    MEDICATIONS  (PRN):  dextrose Oral Gel 15 Gram(s) Oral once PRN Blood Glucose LESS THAN 70 milliGRAM(s)/deciliter      VITAL SIGNS:  T(F): 98.7 (01-06-23 @ 05:10), Max: 98.7 (01-06-23 @ 05:10)  HR: 77 (01-06-23 @ 05:10) (63 - 91)  BP: 139/76 (01-06-23 @ 05:10) (132/59 - 161/82)  RR: 18 (01-06-23 @ 05:10) (18 - 30)  SpO2: 100% (01-06-23 @ 05:10) (94% - 100%)    I&O's Summary    Daily Height in cm: 167.64 (06 Jan 2023 01:28)    Daily     PHYSICAL EXAM:  Gen: Alert, NAD  HEENT: NCAT, conjunctiva clear, sclera anicteric, no erythema or exudates in the oropharynx, mmm  Neck: Supple, no JVD  CV: RRR, S1S2, no m/r/g  Resp: CTAB, normal respiratory effort  Abd: Soft, nontender, nondistended, normal bowel sounds  Ext: no edema, no clubbing or cyanosis  Neuro: AOx3, CN2-12 grossly intact, KELLEY  SKIN: warm, perfused    LABS:                        12.2   10.69 )-----------( 160      ( 05 Jan 2023 16:57 )             37.5     Hgb Trend: 12.2<--  01-05    138  |  105  |  29<H>  ----------------------------<  77  4.4   |  19<L>  |  0.98    Ca    9.7      05 Jan 2023 16:57    TPro  6.9  /  Alb  3.9  /  TBili  0.5  /  DBili  x   /  AST  13  /  ALT  5   /  AlkPhos  79  01-05    Creatinine Trend: 0.98<--, 0.81<--, 0.82<--, 0.89<--, 0.93<--, 0.92<--  LIVER FUNCTIONS - ( 05 Jan 2023 16:57 )  Alb: 3.9 g/dL / Pro: 6.9 g/dL / ALK PHOS: 79 U/L / ALT: 5 U/L / AST: 13 U/L / GGT: x           PT/INR - ( 05 Jan 2023 19:20 )   PT: 12.5 sec;   INR: 1.08 ratio         PTT - ( 05 Jan 2023 19:20 )  PTT:48.9 sec          CAPILLARY BLOOD GLUCOSE      POCT Blood Glucose.: 167 mg/dL (06 Jan 2023 01:21)  POCT Blood Glucose.: 77 mg/dL (05 Jan 2023 23:30)  POCT Blood Glucose.: 72 mg/dL (05 Jan 2023 23:10)      RADIOLOGY & ADDITIONAL TESTS: Reviewed    Imaging Personally Reviewed:    Consultant(s) Notes Reviewed:      Care Discussed with Consultants/Other Providers:   Marii Ortiz, PGY1  TEAMS or Pager [phone]     Patient is a 82y old  Male who presents with a chief complaint of Pleural effusion (05 Jan 2023 21:10)    [place]  Jesus #[id number]    SUBJECTIVE/INTERVAL EVENTS: NAOE. Patient seen and examined at bedside. He reports dyspnea with ambulation but not while at rest. He states that oxygen via NC was helpful for dyspnea. He is requesting a thoracentesis to remove fluid and states that it was helpful for him two weeks ago when he was first diagnosed with lung cancer. He has not started any treatment for cancer and was pending PET scan. He ambulates with a cane at baseline. He lives with his daughter. He has been tolerating PO intake and denies N/V/D/C. Otherwise, denies chest pain, lightheadedness, palpitations, LE swelling, fevers, sick contacts, or cough.    MEDICATIONS  (STANDING):  amLODIPine   Tablet 10 milliGRAM(s) Oral daily  aspirin enteric coated 81 milliGRAM(s) Oral daily  atorvastatin 20 milliGRAM(s) Oral at bedtime  dextrose 5%. 1000 milliLiter(s) (50 mL/Hr) IV Continuous <Continuous>  dextrose 5%. 1000 milliLiter(s) (100 mL/Hr) IV Continuous <Continuous>  dextrose 50% Injectable 25 Gram(s) IV Push once  dextrose 50% Injectable 12.5 Gram(s) IV Push once  dextrose 50% Injectable 25 Gram(s) IV Push once  glucagon  Injectable 1 milliGRAM(s) IntraMuscular once  heparin   Injectable 5000 Unit(s) SubCutaneous every 12 hours  insulin lispro (ADMELOG) corrective regimen sliding scale   SubCutaneous three times a day before meals  insulin lispro (ADMELOG) corrective regimen sliding scale   SubCutaneous at bedtime  lisinopril 20 milliGRAM(s) Oral daily    MEDICATIONS  (PRN):  dextrose Oral Gel 15 Gram(s) Oral once PRN Blood Glucose LESS THAN 70 milliGRAM(s)/deciliter      VITAL SIGNS:  T(F): 98.7 (01-06-23 @ 05:10), Max: 98.7 (01-06-23 @ 05:10)  HR: 77 (01-06-23 @ 05:10) (63 - 91)  BP: 139/76 (01-06-23 @ 05:10) (132/59 - 161/82)  RR: 18 (01-06-23 @ 05:10) (18 - 30)  SpO2: 100% (01-06-23 @ 05:10) (94% - 100%)    I&O's Summary    Daily Height in cm: 167.64 (06 Jan 2023 01:28)    Daily     PHYSICAL EXAM:  Gen: Alert, NAD  HEENT: NCAT, conjunctiva clear, sclera anicteric, no erythema or exudates in the oropharynx, MMM  Neck: Supple, no JVD  CV: RRR, S1S2, no m/r/g, no JVD  Resp: CTAB, normal respiratory effort, barrel chest  Abd: Soft, nontender, nondistended, normal bowel sounds  Ext: no LE edema, no clubbing or cyanosis  Neuro: AOx3, CN2-12 grossly intact, KELLEY  SKIN: warm, perfused    LABS:                        12.2   10.69 )-----------( 160      ( 05 Jan 2023 16:57 )             37.5     Hgb Trend: 12.2<--  01-05    138  |  105  |  29<H>  ----------------------------<  77  4.4   |  19<L>  |  0.98    Ca    9.7      05 Jan 2023 16:57    TPro  6.9  /  Alb  3.9  /  TBili  0.5  /  DBili  x   /  AST  13  /  ALT  5   /  AlkPhos  79  01-05    Creatinine Trend: 0.98<--, 0.81<--, 0.82<--, 0.89<--, 0.93<--, 0.92<--  LIVER FUNCTIONS - ( 05 Jan 2023 16:57 )  Alb: 3.9 g/dL / Pro: 6.9 g/dL / ALK PHOS: 79 U/L / ALT: 5 U/L / AST: 13 U/L / GGT: x           PT/INR - ( 05 Jan 2023 19:20 )   PT: 12.5 sec;   INR: 1.08 ratio         PTT - ( 05 Jan 2023 19:20 )  PTT:48.9 sec          CAPILLARY BLOOD GLUCOSE      POCT Blood Glucose.: 167 mg/dL (06 Jan 2023 01:21)  POCT Blood Glucose.: 77 mg/dL (05 Jan 2023 23:30)  POCT Blood Glucose.: 72 mg/dL (05 Jan 2023 23:10)      RADIOLOGY & ADDITIONAL TESTS: Reviewed    Imaging Personally Reviewed: CXR    Consultant(s) Notes Reviewed:  Pulmonology    Care Discussed with Consultants/Other Providers: Pulmonology, Oncology

## 2023-01-06 NOTE — PROGRESS NOTE ADULT - PROBLEM SELECTOR PLAN 6
- glucose = 77  on CMP  - reportedly w/ FS 72 since admission and given juice, sandwich, but w/ repeat FS = 77  - FS Q2H x 2 (please fu)  - will not give Lantus 3 units  - ISS, per FS  - consistent carb diet - DVT ppx: lovenox 40 SQ daily  - Diet: consistent carb  - PT evaluation, walks with cane at baseline  - Dispo: pending course

## 2023-01-07 NOTE — PROGRESS NOTE ADULT - ASSESSMENT
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 At this time, his dyspnea has improved following therapeutic thoracentesis, but he is pending placement of an indwelling pleural drain by the interventional pulmonology team next week.

## 2023-01-07 NOTE — PHYSICAL THERAPY INITIAL EVALUATION ADULT - ADDITIONAL COMMENTS
Pt reports he lives in a multi family home ground floor with his wife, daughter lives upstairs.  Pt ambulates with a cane at baseline.  No recent falls.  Completes ADL's independently.

## 2023-01-07 NOTE — PROGRESS NOTE ADULT - SUBJECTIVE AND OBJECTIVE BOX
Osmani Marti [email or web address]     INTERVAL HPI/OVERNIGHT EVENTS: The patient underwent therapeutic thoracentesis yesterday. His symptoms of shortness of breath improved significantly. He has no pain at the needle insertion site at rest or with inspiration.     SUBJECTIVE: Patient seen and examined at bedside.     CONSTITUTIONAL: No weakness, fevers, or chills  EYES/ENT: No visual changes; no dizziness   NECK: No pain, no stiffness  RESPIRATORY: No cough, no shortness of breath  CARDIOVASCULAR: No chest pain, no palpitations  GASTROINTESTINAL: No abdominal or epigastric pain. No nausea, vomiting, or hematemesis; No diarrhea or constipation. No melena or hematochezia.  GENITOURINARY: No dysuria, frequency, or hematuria  NEUROLOGICAL: No numbness, no weakness  SKIN: No itching, no rashes    OBJECTIVE:    VITAL SIGNS:  T(C): 36.3 (07 Jan 2023 05:45), Max: 37.5 (06 Jan 2023 21:44)  T(F): 97.4 (07 Jan 2023 05:45), Max: 99.5 (06 Jan 2023 21:44)  HR: 78 (07 Jan 2023 05:45) (78 - 95)  BP: 140/66 (07 Jan 2023 05:45) (140/66 - 154/72)  RR: 16 (07 Jan 2023 05:45) (16 - 18)  SpO2: 96% (07 Jan 2023 05:45) (95% - 100%)    O2 Parameters below as of 07 Jan 2023 05:45  Patient On (Oxygen Delivery Method): nasal cannula  O2 Flow (L/min): 3    CAPILLARY BLOOD GLUCOSE      POCT Blood Glucose.: 136 mg/dL (07 Jan 2023 08:44)      PHYSICAL EXAM:    General: NAD; awake and alert; not using accessory muscles of respiration   HEENT: PERRL grossly, clear conjunctiva  Neck: No jvd, no lymphadenopathy  Respiratory: CTA b/l, no rales, no wheezes; equal respiratory excursion; no bleeding at site of dressings over left lung; no tenderness to palpation   Cardiovascular: +S1/S2; RRR  Abdomen: soft, NT/ND; +BS x4  Extremities: WWP, 2+ peripheral pulses b/l; no LE edema  Skin: normal color and turgor; no rash  Neurological: No gross motor or sensory deficits; coordination intact     MEDICATIONS:  MEDICATIONS  (STANDING):  amLODIPine   Tablet 10 milliGRAM(s) Oral daily  aspirin enteric coated 81 milliGRAM(s) Oral daily  atorvastatin 20 milliGRAM(s) Oral at bedtime  dextrose 5%. 1000 milliLiter(s) (50 mL/Hr) IV Continuous <Continuous>  dextrose 5%. 1000 milliLiter(s) (100 mL/Hr) IV Continuous <Continuous>  dextrose 50% Injectable 25 Gram(s) IV Push once  dextrose 50% Injectable 12.5 Gram(s) IV Push once  dextrose 50% Injectable 25 Gram(s) IV Push once  glucagon  Injectable 1 milliGRAM(s) IntraMuscular once  heparin   Injectable 5000 Unit(s) SubCutaneous every 12 hours  insulin lispro (ADMELOG) corrective regimen sliding scale   SubCutaneous three times a day before meals  insulin lispro (ADMELOG) corrective regimen sliding scale   SubCutaneous at bedtime  lisinopril 20 milliGRAM(s) Oral daily  magnesium sulfate  IVPB 1 Gram(s) IV Intermittent once    MEDICATIONS  (PRN):  dextrose Oral Gel 15 Gram(s) Oral once PRN Blood Glucose LESS THAN 70 milliGRAM(s)/deciliter      ALLERGIES:  Allergies    No Known Allergies    Intolerances        LABS:                        12.3   10.66 )-----------( 171      ( 07 Jan 2023 07:12 )             37.0     01-07    134<L>  |  101  |  18  ----------------------------<  119<H>  4.4   |  20<L>  |  0.87    Ca    9.7      07 Jan 2023 07:12  Phos  2.7     01-07  Mg     1.50     01-07    TPro  6.9  /  Alb  3.9  /  TBili  0.5  /  DBili  x   /  AST  13  /  ALT  5   /  AlkPhos  79  01-05    PT/INR - ( 05 Jan 2023 19:20 )   PT: 12.5 sec;   INR: 1.08 ratio         PTT - ( 05 Jan 2023 19:20 )  PTT:48.9 sec      RADIOLOGY & ADDITIONAL TESTS: Reviewed.

## 2023-01-07 NOTE — PROGRESS NOTE ADULT - PROBLEM SELECTOR PLAN 4
- reportedly w/ FS 72 since admission and given juice, sandwich, but w/ repeat FS = 77  - Hb A1c 5.7, prediabetic    Plan  - FS glu qAC and qHS as pt with recent hypoglycemia  - low dose ISS, can consider discontinuing if FSG in normal range  - consistent carb diet

## 2023-01-07 NOTE — PROGRESS NOTE ADULT - PROBLEM SELECTOR PLAN 2
-Persistent respiratory failure noted subsequent to thoracentesis on 1/6; the patient is maintaining an oxygen saturation greater than 95% on 2 L/min by nasal cannula, but his oxygen saturation decreases, and he becomes tachypneic when he is placed on room air   -Respiratory failure is most likely secondary to persistent pleural fluid (likely that not all fluid in pleural space was drained by pulmonology team as patient may have developed pain)  -Will continue to administer oxygen by nasal cannula; it is most likely that the patient will require oxygen on discharge

## 2023-01-07 NOTE — PROGRESS NOTE ADULT - PROBLEM SELECTOR PLAN 3
- with 25 pack yr history of cigarette smoking; quit ~ 30 years ago  - pleural effusion likely due to same  - followed by [facility]    Plan  - Oncology consulted, no plan for chemotherapy while inpatient

## 2023-01-07 NOTE — PROGRESS NOTE ADULT - PROBLEM SELECTOR PLAN 5
- DVT ppx: lovenox 40 SQ daily  - Diet: consistent carb  - PT evaluation, walks with cane at baseline  - Dispo: pending course

## 2023-01-07 NOTE — PHYSICAL THERAPY INITIAL EVALUATION ADULT - PERTINENT HX OF CURRENT PROBLEM, REHAB EVAL
82 year old male, with past history significant for Pleural effusion, Lung cancer Home oxygen use (2 liters), HTN, DM-2, HLD, and Dengue fever, presented to the ED secondary to worsening difficulty in breathing.  Seen and evaluated at bedside; tachypneic at times to low 30's, but no signs of acute distress.  Via , patient endorses breathing difficulties, including worsening shortness of breath, decreased exercise endurance, and occasional palpitations for the past ~ one week.

## 2023-01-07 NOTE — PROGRESS NOTE ADULT - ATTENDING COMMENTS
82M w/ HTN, HLD, T2DM, recent admission in December 2022 for SOB fth large L sided pleural effusion s/p thoracentesis w/ path showing lung adenoCA not yet started on treatment p/w worsening SOB, likely due to reaccumulation of pleural effusion  -on 3L NC  -appreciate Pulm eval, bedside thoracentesis done 1/7, removal of 900cc pleural fluid  -per Pulm, plan for pleurX cath placement planned for 1/11  -heme/onc consulted: no plans for inpatient chemo, but following to discuss OP treatment  -wean O2 as able  PT eval

## 2023-01-07 NOTE — PROGRESS NOTE ADULT - PROBLEM SELECTOR PLAN 1
- causing JERNIGAN, shortness of breath  - with history of lung cancer; CXR w/ TAVON mass  - underwent thoracentesis on 12/12/2022, which confirmed a diagnosis of stage IV lung cancer with an associated malignant pleural effusion  - CXR = "Moderate left pleural effusion with associated passive atelectasis;" status post drainage of pleural fluid on 1/6 by pulmonology team; pending placement of indwelling pleural drain by interventional pulmonology team     Plan  - Pulmonology performed therapeutic thoracentesis today       - exudative by Light's criteria with fluid LDH 1389, likely 2/2 NSCLC  - recurrent issue, Pulmonology recommending inpatient pleurx catheter which will be placed next week - causing JERNIGAN, shortness of breath  - with history of lung cancer; CXR w/ TAVON mass  - underwent thoracentesis on 12/12/2022, which confirmed a diagnosis of stage IV lung cancer with an associated malignant pleural effusion  - CXR = "Moderate left pleural effusion with associated passive atelectasis;" status post drainage of pleural fluid on 1/6 by pulmonology team; pending placement of indwelling pleural drain by interventional pulmonology team     Plan  - Pulmonology performed therapeutic thoracentesis today       - exudative by Light's criteria with fluid LDH 1389, likely 2/2 NSCLC       - Although pleural fluid ldh greater than 1000 and pleural glucose less than 60, clinical picture is so inconsistent with infection that will not call back pulmonology for consideration of pleural drainage; if patient develops any signs or symptoms of infection, will call back   - recurrent issue, Pulmonology recommending inpatient pleurx catheter which will be placed next week

## 2023-01-08 NOTE — PROGRESS NOTE ADULT - PROBLEM SELECTOR PLAN 2
-Persistent respiratory failure noted subsequent to thoracentesis on 1/6; the patient is maintaining an oxygen saturation greater than 95% on 2 L/min by nasal cannula, but his oxygen saturation decreases, and he becomes tachypneic when he is placed on room air   -Respiratory failure is most likely secondary to persistent pleural fluid (likely that not all fluid in pleural space was drained by pulmonology team as patient may have developed pain)  -Will continue to administer oxygen by nasal cannula; it is most likely that the patient will require oxygen on discharge - persistent respiratory failure noted subsequent to thoracentesis on 1/6  - currently maintaining SpO2 > 95% on 2-3 L/min by NC, but oxygen saturation decreases and becomes tachypneic when placed on room air   - respiratory failure is most likely secondary to recurrent L sided pleural effusion    Plan  - will continue to administer oxygen by nasal cannula  - it is most likely that the patient will require oxygen on discharge (baseline home O2 2-3L NC)

## 2023-01-08 NOTE — DISCHARGE NOTE PROVIDER - HOSPITAL COURSE
Discharge diagnoses:   Malignant pleural effusion s/p pleurx catheter  Stage IV L lung adenocarcinoma    HPI:  82 year old male, with past history significant for Pleural effusion, Lung cancer Home oxygen use (2 liters), HTN, DM-2, HLD, and Dengue fever, presented to the ED secondary to worsening difficulty in breathing.  Seen and evaluated at bedside; tachypneic at times to low 30's, but no signs of acute distress.  Via , patient endorses breathing difficulties, including worsening shortness of breath, decreased exercise endurance, and occasional palpitations for the past ~ one week.  Uses 2 pillows nightly.  States feeling as if fluid has accumulated in the lungs.  No chest pain.  No edema.  No coughing.  Per report from family, patient's home oxygen was increased to 3 liters because of increased work of breathing, pronounced over the recent hours.      Vital signs upon ED presentation as follows: BP = 161/82, HR = 91, RR = 30, T = 36.7 C (98 F), O2 Sat = 94% on RA (to 100% on 3L NC).  Diagnosed with Pleural Effusion in the ED.    Hospital Course:   For full details, please see H&P, progress notes, consult notes and ancillary notes. Briefly, TEJAS SALVADOR is a 82yMale with a history of ***. The patient's hospital course will be summarized in a problem based format.    # Pleural effusion, left.   ·  Plan: - initially presented with 1 week of worsening JERNIGAN, orthopnea  - diagnosed with stage IV lung adenocarcinoma during recent admission 12/5-12/17  - thoracentesis on 12/12/22 with associated malignant pleural effusion  - CXR with TAVON mass, "moderate left pleural effusion with associated passive atelectasis"    Plan  - Pulmonology performed L sided therapeutic thoracentesis on 1/6 (900 mL removed)       - exudative by Light's criteria with fluid LDH 1389, likely 2/2 NSCLC       - Although pleural fluid LDH > 1000 and pleural glucose < 60, clinical picture is inconsistent with infection       - If patient develops any signs or symptoms of infection, will call back Pulmonology for further drainage   - Now with improvement in SOB s/p thoracentesis, on 3 L O2 via NC  - Pt with recurrent malignant pleural effusion       - Pulmonology recommending inpatient pleurx catheter placement on 1/11.    # Acute respiratory failure with hypoxia.   ·  Plan: - persistent respiratory failure noted subsequent to thoracentesis on 1/6  - currently maintaining SpO2 > 95% on 2-3 L/min by NC, but oxygen saturation decreases and becomes tachypneic when placed on room air   - respiratory failure is most likely secondary to recurrent L sided pleural effusion    Plan  - will continue to administer oxygen by nasal cannula  - it is most likely that the patient will require oxygen on discharge (baseline home O2 2-3L NC).    # Lung cancer.   - with 25 pack year history of cigarette smoking; quit ~ 30 years ago  - followed by [facility]    Plan  - Oncology consulted, no plan for chemotherapy while inpatient  - patient has not initiated treatment for lung cancer yet due to recent diagnosis, but is interested in pursuing as an outpatient.    On day of discharge, patient is clinically stable with no new exam findings or acute symptoms compared to prior. The patient was seen by the attending physician on the date of discharge and deemed stable and acceptable for discharge. The patient's chronic medical conditions were treated accordingly per the patient's home medication regimen. The patient's medication reconciliation (with changes made to chronic medications), follow up appointments, discharge orders, instructions, and significant lab and diagnostic studies are as noted.     Discharge follow up action items:     1. Follow up with PCP in 1-2 weeks. Follow up with Pulmonology on ***  2. Follow up labs, path, & imaging: none  3. Medication changes: none  - Pleurx catheter placed on 1/11  4. On hold medications: none    Patient's ordered code status: FULL code    Patient disposition: Home with no PT needs     Discharge diagnoses:   Malignant pleural effusion s/p pleurx catheter  Stage IV L lung adenocarcinoma    HPI:  82 year old male, with past history significant for Pleural effusion, Lung cancer Home oxygen use (2 liters), HTN, DM-2, HLD, and Dengue fever, presented to the ED secondary to worsening difficulty in breathing.  Seen and evaluated at bedside; tachypneic at times to low 30's, but no signs of acute distress.  Via , patient endorses breathing difficulties, including worsening shortness of breath, decreased exercise endurance, and occasional palpitations for the past ~ one week.  Uses 2 pillows nightly.  States feeling as if fluid has accumulated in the lungs.  No chest pain.  No edema.  No coughing.  Per report from family, patient's home oxygen was increased to 3 liters because of increased work of breathing, pronounced over the recent hours.      Vital signs upon ED presentation as follows: BP = 161/82, HR = 91, RR = 30, T = 36.7 C (98 F), O2 Sat = 94% on RA (to 100% on 3L NC).  Diagnosed with Pleural Effusion in the ED.    Hospital Course:   For full details, please see H&P, progress notes, consult notes and ancillary notes. Briefly, TEJAS SALVADOR is a 82yMale with a history of ***. The patient's hospital course will be summarized in a problem based format.    # Pleural effusion, left.   ·  Plan: - initially presented with 1 week of worsening JERNIGAN, orthopnea  - diagnosed with stage IV lung adenocarcinoma during recent admission 12/5-12/17  - thoracentesis on 12/12/22 with associated malignant pleural effusion  - CXR with TAVON mass, "moderate left pleural effusion with associated passive atelectasis"    Plan  - Pulmonology performed L sided therapeutic thoracentesis on 1/6 (900 mL removed)       - exudative by Light's criteria with fluid LDH 1389, likely 2/2 NSCLC       - Although pleural fluid LDH > 1000 and pleural glucose < 60, clinical picture is inconsistent with infection       - If patient develops any signs or symptoms of infection, will call back Pulmonology for further drainage   - Now with improvement in SOB s/p thoracentesis, on 3 L O2 via NC  - Pt with recurrent malignant pleural effusion       - Pulmonology recommending inpatient pleurx catheter placement on 1/11 which was done with no inpatient complications.    # Acute respiratory failure with hypoxia.   ·  Plan: - persistent respiratory failure noted subsequent to thoracentesis on 1/6  - currently maintaining SpO2 > 95% on 2-3 L/min by NC, but oxygen saturation decreases and becomes tachypneic when placed on room air   - respiratory failure is most likely secondary to recurrent L sided pleural effusion    Plan  - will continue to administer oxygen by nasal cannula  - it is most likely that the patient will require oxygen on discharge (baseline home O2 2-3L NC).    # Lung cancer.   - with 25 pack year history of cigarette smoking; quit ~ 30 years ago  - followed by [facility]    Plan  - Oncology consulted, no plan for chemotherapy while inpatient  - patient has not initiated treatment for lung cancer yet due to recent diagnosis, but is interested in pursuing as an outpatient.    On day of discharge, patient is clinically stable with no new exam findings or acute symptoms compared to prior. The patient was seen by the attending physician on the date of discharge and deemed stable and acceptable for discharge. The patient's chronic medical conditions were treated accordingly per the patient's home medication regimen. The patient's medication reconciliation (with changes made to chronic medications), follow up appointments, discharge orders, instructions, and significant lab and diagnostic studies are as noted.     Discharge follow up action items:     1. Follow up with PCP in 1-2 weeks. Follow up with Pulmonology  2. Follow up labs, path, & imaging: none  3. Medication changes: none  - Pleurx catheter placed on 1/11  4. On hold medications: none    Patient's ordered code status: FULL code    Patient disposition: Home with no PT needs

## 2023-01-08 NOTE — PROGRESS NOTE ADULT - PROBLEM SELECTOR PLAN 3
- with 25 pack yr history of cigarette smoking; quit ~ 30 years ago  - pleural effusion likely due to same  - followed by [facility]    Plan  - Oncology consulted, no plan for chemotherapy while inpatient - with 25 pack year history of cigarette smoking; quit ~ 30 years ago  - followed by [facility]    Plan  - Oncology consulted, no plan for chemotherapy while inpatient  - patient has not initiated treatment for lung cancer yet due to recent diagnosis, but is interested in pursuing as an outpatient

## 2023-01-08 NOTE — DISCHARGE NOTE PROVIDER - NSDCFUSCHEDAPPT_GEN_ALL_CORE_FT
[facility]  [facility]  Scheduled Appointment: 01/19/2023     [facility]  [facility]  [facility]  Scheduled Appointment: 01/19/2023    [facility]  [facility]  Scheduled Appointment: 01/19/2023    [facility]  [facility]  Scheduled Appointment: 02/09/2023    [facility] Segovia  [facility]  [facility]  Scheduled Appointment: 02/09/2023    [facility] Seg[facility]ia  [facility]  [facility]  Scheduled Appointment: 03/02/2023    [facility]  [facility]  Scheduled Appointment: 03/02/2023    [facility]  Scheduled Appointment: 03/23/2023     Vivi Segovia  [facility]  [facility]  Scheduled Appointment: 01/19/2023    [facility]  [facility]  Scheduled Appointment: 01/19/2023    Peter Morales  [facility] Physician [facility]  PULED [address] [place] [address]  Scheduled Appointment: 02/06/2023    Vivi Segovia  [facility]  [facility]  Scheduled Appointment: 02/09/2023    [facility]  [facility]  Scheduled Appointment: 02/09/2023    [facility]  [facility]  Scheduled Appointment: 03/02/2023    [facility]ivi Segovia  [facility]  [facility]  Scheduled Appointment: 03/02/2023    [facility]  [facility]  Scheduled Appointment: 03/23/2023

## 2023-01-08 NOTE — PROGRESS NOTE ADULT - ATTENDING COMMENTS
82M w/ HTN, HLD, T2DM, recent admission in December 2022 for SOB fth large L sided pleural effusion s/p thoracentesis w/ path showing lung adenoCA not yet started on treatment p/w worsening SOB, likely due to reaccumulation of pleural effusion  -on 3L NC  -appreciate Pulm eval, bedside thoracentesis done 1/7, removal of 900cc pleural fluid  -per Pulm, plan for pleurX cath placement planned for 1/11  -heme/onc consulted: no plans for inpatient chemo, but following to discuss OP treatment  -wean O2 as able    dispo: home no PT needs

## 2023-01-08 NOTE — DISCHARGE NOTE PROVIDER - NSDCMRMEDTOKEN_GEN_ALL_CORE_FT
amLODIPine 10 mg oral tablet: 1 tab(s) orally once a day  aspirin 81 mg oral tablet: 1 tab(s) orally once a day  atorvastatin 20 mg oral tablet: 1 tab(s) orally once a day  lisinopril 20 mg oral tablet: 1 tab(s) orally once a day  metFORMIN 1000 mg oral tablet: 1 tab(s) orally 2 times a day    amLODIPine 10 mg oral tablet: 1 tab(s) orally once a day  atorvastatin 20 mg oral tablet: 1 tab(s) orally once a day  lisinopril 20 mg oral tablet: 1 tab(s) orally once a day  metFORMIN 1000 mg oral tablet: 1 tab(s) orally 2 times a day    amLODIPine 10 mg oral tablet: 1 tab(s) orally once a day  atorvastatin 20 mg oral tablet: 1 tab(s) orally once a day  lisinopril 40 mg oral tablet: 1 tab(s) orally once a day  metFORMIN 1000 mg oral tablet: 1 tab(s) orally 2 times a day   PleurX catheter and PleurX single drain kit. Drain up to 1 L three times per week on M/W/F. Diagnosis: malignant pleural effusion J91.0. Secondary condition causing treatment lung cancer C34.90. Estimated duration of need 99 months (lifetime) : PleurX catheter and PleurX single drain kit. Drain up to 1 L three times per week on M/W/F. Diagnosis: malignant pleural effusion J91.0. Secondary condition causing treatment lung cancer C34.90. Estimated duration of need 99 months (lifetime)

## 2023-01-08 NOTE — DISCHARGE NOTE PROVIDER - NSDCCPTREATMENT_GEN_ALL_CORE_FT
PRINCIPAL PROCEDURE  Procedure: CT  Findings and Treatment:   < end of copied text >  IMPRESSION:  5 x 4.3 cmleft upper lobe mass as described above likely neoplastic in   etiology and representing a primary lung neoplasm with mildly enlarged   mediastinal lymph node as described above.  Small multiloculated left pleural effusion with interval decrease insize   with interval improved aeration of the left lung as compared with   December 7, 2022.  Nonspecific minimal erosion of the left fourth rib.< from: CT Chest No Cont (12.09.22 @ 09:03) >

## 2023-01-08 NOTE — PROGRESS NOTE ADULT - SUBJECTIVE AND OBJECTIVE BOX
Marii Ortiz, PGY1  TEAMS or Pager [phone]     Patient is a 82y old male who presents with a chief complaint of Pleural effusion (07 Jan 2023 06:25)    SUBJECTIVE/INTERVAL EVENTS: NAOE. Patient seen and examined at bedside. He has SOB with ambulation currently requiring 3 L O2 via NC. When attempting to wean oxygen to room air, he becomes tachypneic at rest. He denies pain or drainage at thoracentesis site. Otherwise, denies chest pain, lightheadedness, nausea, vomiting, constipation, or diarrhea.    MEDICATIONS  (STANDING):  amLODIPine   Tablet 10 milliGRAM(s) Oral daily  aspirin enteric coated 81 milliGRAM(s) Oral daily  atorvastatin 20 milliGRAM(s) Oral at bedtime  dextrose 5%. 1000 milliLiter(s) (100 mL/Hr) IV Continuous <Continuous>  dextrose 5%. 1000 milliLiter(s) (50 mL/Hr) IV Continuous <Continuous>  dextrose 50% Injectable 25 Gram(s) IV Push once  dextrose 50% Injectable 12.5 Gram(s) IV Push once  dextrose 50% Injectable 25 Gram(s) IV Push once  glucagon  Injectable 1 milliGRAM(s) IntraMuscular once  heparin   Injectable 5000 Unit(s) SubCutaneous every 12 hours  insulin lispro (ADMELOG) corrective regimen sliding scale   SubCutaneous three times a day before meals  insulin lispro (ADMELOG) corrective regimen sliding scale   SubCutaneous at bedtime  lisinopril 20 milliGRAM(s) Oral daily    MEDICATIONS  (PRN):  dextrose Oral Gel 15 Gram(s) Oral once PRN Blood Glucose LESS THAN 70 milliGRAM(s)/deciliter      VITAL SIGNS:  T(F): 98.3 (01-07-23 @ 22:44), Max: 98.3 (01-07-23 @ 22:44)  HR: 95 (01-07-23 @ 22:44) (78 - 95)  BP: 149/66 (01-07-23 @ 22:44) (134/60 - 149/66)  RR: 17 (01-07-23 @ 22:44) (16 - 17)  SpO2: 96% (01-07-23 @ 22:44) (96% - 96%)    I&O's Summary    Daily     Daily     PHYSICAL EXAM:  Gen: Alert, NAD  HEENT: NCAT, conjunctiva clear, sclera anicteric, no erythema or exudates in the oropharynx, mmm  Neck: Supple, no JVD  CV: RRR, S1S2, no m/r/g  Resp: CTAB, normal respiratory effort  Abd: Soft, nontender, nondistended, normal bowel sounds  Ext: no edema, no clubbing or cyanosis  Neuro: AOx3, CN2-12 grossly intact, KELLEY  SKIN: warm, perfused    LABS:                        12.3   10.66 )-----------( 171      ( 07 Jan 2023 07:12 )             37.0     Hgb Trend: 12.3<--, 10.9<--, 12.2<--  01-07    134<L>  |  101  |  18  ----------------------------<  119<H>  4.4   |  20<L>  |  0.87    Ca    9.7      07 Jan 2023 07:12  Phos  2.7     01-07  Mg     1.50     01-07      Creatinine Trend: 0.87<--, 0.90<--, 0.98<--, 0.81<--, 0.82<--, 0.89<--              CAPILLARY BLOOD GLUCOSE      POCT Blood Glucose.: 167 mg/dL (07 Jan 2023 21:18)  POCT Blood Glucose.: 115 mg/dL (07 Jan 2023 18:05)  POCT Blood Glucose.: 206 mg/dL (07 Jan 2023 12:35)  POCT Blood Glucose.: 136 mg/dL (07 Jan 2023 08:44)      RADIOLOGY & ADDITIONAL TESTS: Reviewed    Imaging Personally Reviewed:    Consultant(s) Notes Reviewed:      Care Discussed with Consultants/Other Providers:   Marii Ortiz, PGY1  TEAMS or Pager [phone]     Patient is a 82y old male who presents with a chief complaint of Pleural effusion (07 Jan 2023 06:25)    SUBJECTIVE/INTERVAL EVENTS: NAOE. Patient seen and examined at bedside. He has SOB with ambulation currently requiring 3 L O2 via NC. When attempting to wean oxygen to room air, he becomes tachypneic at rest. He denies pain or drainage at thoracentesis site. Otherwise, denies chest pain, lightheadedness, nausea, vomiting, constipation, or diarrhea.    MEDICATIONS  (STANDING):  amLODIPine   Tablet 10 milliGRAM(s) Oral daily  aspirin enteric coated 81 milliGRAM(s) Oral daily  atorvastatin 20 milliGRAM(s) Oral at bedtime  dextrose 5%. 1000 milliLiter(s) (100 mL/Hr) IV Continuous <Continuous>  dextrose 5%. 1000 milliLiter(s) (50 mL/Hr) IV Continuous <Continuous>  dextrose 50% Injectable 25 Gram(s) IV Push once  dextrose 50% Injectable 12.5 Gram(s) IV Push once  dextrose 50% Injectable 25 Gram(s) IV Push once  glucagon  Injectable 1 milliGRAM(s) IntraMuscular once  heparin   Injectable 5000 Unit(s) SubCutaneous every 12 hours  insulin lispro (ADMELOG) corrective regimen sliding scale   SubCutaneous three times a day before meals  insulin lispro (ADMELOG) corrective regimen sliding scale   SubCutaneous at bedtime  lisinopril 20 milliGRAM(s) Oral daily    MEDICATIONS  (PRN):  dextrose Oral Gel 15 Gram(s) Oral once PRN Blood Glucose LESS THAN 70 milliGRAM(s)/deciliter      VITAL SIGNS:  T(F): 98.3 (01-07-23 @ 22:44), Max: 98.3 (01-07-23 @ 22:44)  HR: 95 (01-07-23 @ 22:44) (78 - 95)  BP: 149/66 (01-07-23 @ 22:44) (134/60 - 149/66)  RR: 17 (01-07-23 @ 22:44) (16 - 17)  SpO2: 96% (01-07-23 @ 22:44) (96% - 96%)    I&O's Summary    Daily     Daily     PHYSICAL EXAM:  Gen: Alert, NAD  HEENT: NCAT, conjunctiva clear, sclera anicteric, no erythema or exudates in the oropharynx, mmm  Neck: Supple, no JVD  CV: RRR, S1S2, no m/r/g  Resp: CTAB, normal respiratory effort  Abd: Soft, nontender, nondistended, normal bowel sounds  Ext: no edema, no clubbing or cyanosis  Neuro: AOx3, CN2-12 grossly intact, KELLEY  SKIN: warm, perfused    LABS:                        12.3   10.66 )-----------( 171      ( 07 Jan 2023 07:12 )             37.0     Hgb Trend: 12.3<--, 10.9<--, 12.2<--  01-07    134<L>  |  101  |  18  ----------------------------<  119<H>  4.4   |  20<L>  |  0.87    Ca    9.7      07 Jan 2023 07:12  Phos  2.7     01-07  Mg     1.50     01-07      Creatinine Trend: 0.87<--, 0.90<--, 0.98<--, 0.81<--, 0.82<--, 0.89<--              CAPILLARY BLOOD GLUCOSE      POCT Blood Glucose.: 167 mg/dL (07 Jan 2023 21:18)  POCT Blood Glucose.: 115 mg/dL (07 Jan 2023 18:05)  POCT Blood Glucose.: 206 mg/dL (07 Jan 2023 12:35)  POCT Blood Glucose.: 136 mg/dL (07 Jan 2023 08:44)      RADIOLOGY & ADDITIONAL TESTS: Reviewed    Imaging Personally Reviewed:    Consultant(s) Notes Reviewed:      Care Discussed with Consultants/Other Providers:   Marii Ortiz, PGY1  TEAMS or Pager [phone]     Patient is a 82y old male who presents with a chief complaint of Pleural effusion (07 Jan 2023 06:25)    Elver  Madie #[id number]    SUBJECTIVE/INTERVAL EVENTS: NAOE. Patient seen and examined at bedside. Since thoracentesis, he reports significant improvement in dyspnea and orthopnea compared to admission. He is still requiring 3 L O2 via NC and has some SOB when ambulating to the bathroom. He denies pain or drainage at thoracentesis site. Otherwise, denies chest pain, lightheadedness, constipation, or diarrhea. Reports last BM yesterday. Tolerating PO intake without N/V.    MEDICATIONS  (STANDING):  amLODIPine   Tablet 10 milliGRAM(s) Oral daily  aspirin enteric coated 81 milliGRAM(s) Oral daily  atorvastatin 20 milliGRAM(s) Oral at bedtime  dextrose 5%. 1000 milliLiter(s) (100 mL/Hr) IV Continuous <Continuous>  dextrose 5%. 1000 milliLiter(s) (50 mL/Hr) IV Continuous <Continuous>  dextrose 50% Injectable 25 Gram(s) IV Push once  dextrose 50% Injectable 12.5 Gram(s) IV Push once  dextrose 50% Injectable 25 Gram(s) IV Push once  glucagon  Injectable 1 milliGRAM(s) IntraMuscular once  heparin   Injectable 5000 Unit(s) SubCutaneous every 12 hours  insulin lispro (ADMELOG) corrective regimen sliding scale   SubCutaneous three times a day before meals  insulin lispro (ADMELOG) corrective regimen sliding scale   SubCutaneous at bedtime  lisinopril 20 milliGRAM(s) Oral daily    MEDICATIONS  (PRN):  dextrose Oral Gel 15 Gram(s) Oral once PRN Blood Glucose LESS THAN 70 milliGRAM(s)/deciliter    VITAL SIGNS:  T(F): 98.3 (01-07-23 @ 22:44), Max: 98.3 (01-07-23 @ 22:44)  HR: 95 (01-07-23 @ 22:44) (78 - 95)  BP: 149/66 (01-07-23 @ 22:44) (134/60 - 149/66)  RR: 17 (01-07-23 @ 22:44) (16 - 17)  SpO2: 96% (01-07-23 @ 22:44) (96% - 96%)    I&O's Summary    Daily     Daily     PHYSICAL EXAM:  Gen: Alert, NAD  HEENT: NC/AT, conjunctiva clear, sclera anicteric, no erythema or exudates in the oropharynx, MMM  Neck: Supple, no JVD  CV: RRR, S1S2, no m/r/g, no JVD  Resp: CTAB, normal respiratory effort, barrel chest, no cough, on 3L NC  Abd: Soft, nontender, nondistended, normal bowel sounds  Ext: no edema, no clubbing or cyanosis  Neuro: AOx3, CN2-12 grossly intact, KELLEY  SKIN: warm, perfused    LABS:                        12.3   10.66 )-----------( 171      ( 07 Jan 2023 07:12 )             37.0     Hgb Trend: 12.3<--, 10.9<--, 12.2<--  01-07    134<L>  |  101  |  18  ----------------------------<  119<H>  4.4   |  20<L>  |  0.87    Ca    9.7      07 Jan 2023 07:12  Phos  2.7     01-07  Mg     1.50     01-07      Creatinine Trend: 0.87<--, 0.90<--, 0.98<--, 0.81<--, 0.82<--, 0.89<--    CAPILLARY BLOOD GLUCOSE  POCT Blood Glucose.: 167 mg/dL (07 Jan 2023 21:18)  POCT Blood Glucose.: 115 mg/dL (07 Jan 2023 18:05)  POCT Blood Glucose.: 206 mg/dL (07 Jan 2023 12:35)  POCT Blood Glucose.: 136 mg/dL (07 Jan 2023 08:44)    RADIOLOGY & ADDITIONAL TESTS: Reviewed    Imaging Personally Reviewed:    Consultant(s) Notes Reviewed: Pulmonology    Care Discussed with Consultants/Other Providers: Pulmonology

## 2023-01-08 NOTE — DISCHARGE NOTE PROVIDER - DETAILS OF MALNUTRITION DIAGNOSIS/DIAGNOSES
This patient has been assessed with a concern for Malnutrition and was treated during this hospitalization for the following Nutrition diagnosis/diagnoses:     -  01/12/2023: Severe protein-calorie malnutrition

## 2023-01-08 NOTE — DISCHARGE NOTE PROVIDER - CARE PROVIDER_API CALL
Peter Morales)  Critical Care Medicine; Internal Medicine; Pulmonary Disease  [address] [place] [address]  Phone: [phone]  Fax: [phone]  Follow Up Time:     Vivi Segovia)  Internal Medicine; Medical Oncology  [address] [place] [address]  Phone: [phone]  Fax: [phone]  Follow Up Time:

## 2023-01-08 NOTE — PROGRESS NOTE ADULT - PROBLEM SELECTOR PLAN 6
- DVT ppx: lovenox 40 SQ daily  - Diet: consistent carb  - PT evaluation, walks with cane at baseline  - Dispo: pending course - DVT ppx: lovenox 40 SQ daily  - Diet: consistent carb  - PT evaluation: home with no needs  - Dispo: pending course

## 2023-01-08 NOTE — PROGRESS NOTE ADULT - PROBLEM SELECTOR PLAN 1
- causing JERNIGAN, shortness of breath  - with history of lung cancer; CXR w/ TAVON mass  - underwent thoracentesis on 12/12/2022, which confirmed a diagnosis of stage IV lung cancer with an associated malignant pleural effusion  - CXR = "Moderate left pleural effusion with associated passive atelectasis;" s/p drainage of pleural fluid on 1/6 by pulmonology team; pending placement of indwelling pleural drain by interventional pulmonology    Plan  - Pulmonology performed therapeutic thoracentesis on 1/6       - exudative by Light's criteria with fluid LDH 1389, likely 2/2 NSCLC       - Although pleural fluid ldh greater than 1000 and pleural glucose less than 60, clinical picture is so inconsistent with infection that will not call back pulmonology for consideration of pleural drainage; if patient develops any signs or symptoms of infection, will call back   - recurrent issue, Pulmonology recommending inpatient pleurx catheter which will be placed next week - initially presented with 1 week of worsening JERNIGAN, orthopnea  - diagnosed with stage IV lung adenocarcinoma during recent admission 12/5-12/17  - thoracentesis on 12/12/22 with associated malignant pleural effusion  - CXR with TAVON mass, "moderate left pleural effusion with associated passive atelectasis"    Plan  - Pulmonology performed L sided therapeutic thoracentesis on 1/6 (900 mL removed)       - exudative by Light's criteria with fluid LDH 1389, likely 2/2 NSCLC       - Although pleural fluid LDH > 1000 and pleural glucose < 60, clinical picture is inconsistent with infection       - If patient develops any signs or symptoms of infection, will call back Pulmonology for further drainage   - Now with improvement in SOB s/p thoracentesis, on 3 L O2 via NC  - Pt with recurrent malignant pleural effusion       - Pulmonology recommending inpatient pleurx catheter placement on 1/11

## 2023-01-08 NOTE — DISCHARGE NOTE PROVIDER - NSDCFUADDAPPT_GEN_ALL_CORE_FT
1. Please follow up with your PCP  2. Please follow up with your oncologist  3. Please follow up with the interventional pulmonologist  4. Please follow up with the pulmonologist

## 2023-01-08 NOTE — PROGRESS NOTE ADULT - ASSESSMENT
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 At this time, his dyspnea has improved following therapeutic thoracentesis, but he is pending placement of an indwelling pleural drain by the interventional pulmonology team next week.  The patient is an 82-year-old man with PMH of T2DM, HTN, HLD, and h/o dengue fever who was recently diagnosed with stage IV adenocarcinoma of the left lung with recurrent left malignant pleural effusion and home O2 use during hospital admission in December 2022. He is currently admitted for evaluation and management of shortness of breath secondary to recurrence of his malignant effusion. At this time, his dyspnea has improved following L sided therapeutic thoracentesis (900 mL) on 1/6. He is pending placement of an indwelling pleural drain by the interventional pulmonology team on 1/11. Oncology following as well, recommending outpatient follow-up to initiate treatment of NSCLC.

## 2023-01-08 NOTE — DISCHARGE NOTE PROVIDER - NSDCCPCAREPLAN_GEN_ALL_CORE_FT
PRINCIPAL DISCHARGE DIAGNOSIS  Diagnosis: Pleural effusion  Assessment and Plan of Treatment: Plan  - Follow up with Pulmonology  - Follow up with Oncology at [facility] on 1/19  Contact a health care provider if:  •The amount of time that you are able to do mild exercise decreases or does not improve with time.  •You have a fever.  Get help right away if:  •You are short of breath.  •You develop chest pain.  •You develop a new cough.

## 2023-01-08 NOTE — PROGRESS NOTE ADULT - PROBLEM SELECTOR PLAN 4
- Sys BP elevated 130s-140s recently    Plan  - c/w amlodipine 10 mg and lisinopril 20 mg (home meds)  - continue to monitor

## 2023-01-09 PROBLEM — J90 PLEURAL EFFUSION, NOT ELSEWHERE CLASSIFIED: Chronic | Status: ACTIVE | Noted: 2023-01-01

## 2023-01-09 PROBLEM — Z99.81 DEPENDENCE ON SUPPLEMENTAL OXYGEN: Chronic | Status: ACTIVE | Noted: 2023-01-01

## 2023-01-09 PROBLEM — E78.5 HYPERLIPIDEMIA, UNSPECIFIED: Chronic | Status: ACTIVE | Noted: 2023-01-01

## 2023-01-09 PROBLEM — C34.90 MALIGNANT NEOPLASM OF UNSPECIFIED PART OF UNSPECIFIED BRONCHUS OR LUNG: Chronic | Status: ACTIVE | Noted: 2023-01-01

## 2023-01-09 PROBLEM — R26.9 UNSPECIFIED ABNORMALITIES OF GAIT AND MOBILITY: Chronic | Status: ACTIVE | Noted: 2023-01-01

## 2023-01-09 NOTE — PROGRESS NOTE ADULT - PROBLEM SELECTOR PLAN 4
- Sys BP elevated 130s-140s recently    Plan  - c/w amlodipine 10 mg and lisinopril 20 mg (home meds)  - continue to monitor - Sys BP elevated 130s-150s recently    Plan  - c/w amlodipine 10 mg and lisinopril 20 mg (home meds)  - continue to monitor  - discontinue ASA 81 mg, pt was taking for primary ppx and denies hx of CAD/MI/CVA

## 2023-01-09 NOTE — PROGRESS NOTE ADULT - ATTENDING COMMENTS
Patient seen and examined, care plan discussed with house staff as above.    82yoM presenting with SOB, found to have AHRF 2/2 re-accumulation of exudative pleural effusion due to his newly diagnosed (12/2022) metastatic adenocarcinoma of the lung (not yet started on treatment). S/p thoracentesis on 1/6 w 900 ml of exudative pleural fluid drained with hemorrhagic component. Pulm to place pleurex on 1/11. Plan for d/c home 1/11 or 1/12 with close Onc follow up.    Will clarify why patient is on baby asa [ ]. No documentation of CAD in chart. Planning to hold dvt ppx day of procedure. Patient seen and examined, care plan discussed with house staff as above.    82yoM presenting with SOB, found to have AHRF 2/2 re-accumulation of exudative pleural effusion due to his newly diagnosed (12/2022) metastatic adenocarcinoma of the lung (not yet started on treatment). S/p thoracentesis on 1/6 w 900 ml of exudative pleural fluid drained with hemorrhagic component. Pulm to place pleurex on 1/11. Plan for d/c home 1/11 or 1/12 with close Onc follow up.    Will clarify why patient is on baby asa. No documentation of CAD in chart, clarified that there's no hx of CAD, d/c asa. Planning to hold dvt ppx day of procedure.

## 2023-01-09 NOTE — PROGRESS NOTE ADULT - PROBLEM SELECTOR PLAN 6
- DVT ppx: lovenox 40 SQ daily  - Diet: consistent carb  - PT evaluation: home with no needs  - Dispo: pending course

## 2023-01-09 NOTE — PROGRESS NOTE ADULT - ASSESSMENT
The patient is an 82-year-old man with PMH of T2DM, HTN, HLD, and h/o dengue fever who was recently diagnosed with stage IV adenocarcinoma of the left lung with recurrent left malignant pleural effusion and home O2 use during hospital admission in December 2022. He is currently admitted for evaluation and management of shortness of breath secondary to recurrence of his malignant effusion. At this time, his dyspnea has improved following L sided therapeutic thoracentesis (900 mL) on 1/6. He is pending placement of an indwelling pleural drain by the interventional pulmonology team on 1/11. Oncology following as well, recommending outpatient follow-up to initiate treatment of NSCLC. 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 He is currently admitted for evaluation and management of shortness of breath secondary to recurrence of his malignant effusion. At this time, his dyspnea has improved following L sided therapeutic thoracentesis (900 mL) on 1/6. He is pending placement of an indwelling pleural drain by the interventional pulmonology team on 1/11. Oncology following as well, recommending outpatient follow-up to initiate treatment of NSCLC.

## 2023-01-09 NOTE — PROGRESS NOTE ADULT - SUBJECTIVE AND OBJECTIVE BOX
INTERVAL HPI/OVERNIGHT EVENTS:  Patient seen at bedside.      VITAL SIGNS:  T(F): 98.3 (01-09-23 @ 13:39)  HR: 88 (01-09-23 @ 13:39)  BP: 145/68 (01-09-23 @ 13:39)  RR: 18 (01-09-23 @ 13:39)  SpO2: 98% (01-09-23 @ 13:39)  Wt(kg): --    PHYSICAL EXAM:    Constitutional: NAD, resting in bed comfortably  Eyes: EOMI, sclera non-icteric  Neck: supple, no LAP  Respiratory: CTA b/l, good air entry b/l, no wheezing, rhonchi or crackels  Cardiovascular: RRR, normal S1S2, no M/R/G  Gastrointestinal: soft, NTND  Extremities: no edema  Neurological: AAOx3, non focal  Skin: Normal temperature    MEDICATIONS  (STANDING):  amLODIPine   Tablet 10 milliGRAM(s) Oral daily  atorvastatin 20 milliGRAM(s) Oral at bedtime  dextrose 5%. 1000 milliLiter(s) (50 mL/Hr) IV Continuous <Continuous>  dextrose 5%. 1000 milliLiter(s) (100 mL/Hr) IV Continuous <Continuous>  dextrose 50% Injectable 25 Gram(s) IV Push once  dextrose 50% Injectable 12.5 Gram(s) IV Push once  dextrose 50% Injectable 25 Gram(s) IV Push once  enoxaparin Injectable 40 milliGRAM(s) SubCutaneous once  glucagon  Injectable 1 milliGRAM(s) IntraMuscular once  insulin lispro (ADMELOG) corrective regimen sliding scale   SubCutaneous three times a day before meals  insulin lispro (ADMELOG) corrective regimen sliding scale   SubCutaneous at bedtime  lisinopril 20 milliGRAM(s) Oral daily  melatonin 3 milliGRAM(s) Oral at bedtime    MEDICATIONS  (PRN):  dextrose Oral Gel 15 Gram(s) Oral once PRN Blood Glucose LESS THAN 70 milliGRAM(s)/deciliter      Allergies    No Known Allergies    Intolerances        LABS:                        12.7   8.45  )-----------( 168      ( 09 Jan 2023 06:44 )             36.8     01-09    133<L>  |  101  |  22  ----------------------------<  154<H>  4.7   |  22  |  0.88    Ca    9.8      09 Jan 2023 06:44  Phos  2.6     01-09  Mg     1.80     01-09            RADIOLOGY & ADDITIONAL TESTS:  Studies reviewed.   INTERVAL HPI/OVERNIGHT EVENTS:  Patient seen at bedside.  Feels well and has no new complaints. Awaiting pleurex placement    VITAL SIGNS:  T(F): 98.3 (01-09-23 @ 13:39)  HR: 88 (01-09-23 @ 13:39)  BP: 145/68 (01-09-23 @ 13:39)  RR: 18 (01-09-23 @ 13:39)  SpO2: 98% (01-09-23 @ 13:39)  Wt(kg): --    PHYSICAL EXAM:    Constitutional: NAD, resting in bed comfortably  Eyes: EOMI, sclera non-icteric  Neck: supple, no LAP  Respiratory: CTA b/l, good air entry b/l, no wheezing, rhonchi or crackels  Cardiovascular: RRR, normal S1S2, no M/R/G  Gastrointestinal: soft, NTND  Extremities: no edema  Neurological: AAOx3, non focal  Skin: Normal temperature    MEDICATIONS  (STANDING):  amLODIPine   Tablet 10 milliGRAM(s) Oral daily  atorvastatin 20 milliGRAM(s) Oral at bedtime  dextrose 5%. 1000 milliLiter(s) (50 mL/Hr) IV Continuous <Continuous>  dextrose 5%. 1000 milliLiter(s) (100 mL/Hr) IV Continuous <Continuous>  dextrose 50% Injectable 25 Gram(s) IV Push once  dextrose 50% Injectable 12.5 Gram(s) IV Push once  dextrose 50% Injectable 25 Gram(s) IV Push once  enoxaparin Injectable 40 milliGRAM(s) SubCutaneous once  glucagon  Injectable 1 milliGRAM(s) IntraMuscular once  insulin lispro (ADMELOG) corrective regimen sliding scale   SubCutaneous three times a day before meals  insulin lispro (ADMELOG) corrective regimen sliding scale   SubCutaneous at bedtime  lisinopril 20 milliGRAM(s) Oral daily  melatonin 3 milliGRAM(s) Oral at bedtime    MEDICATIONS  (PRN):  dextrose Oral Gel 15 Gram(s) Oral once PRN Blood Glucose LESS THAN 70 milliGRAM(s)/deciliter      Allergies    No Known Allergies    Intolerances        LABS:                        12.7   8.45  )-----------( 168      ( 09 Jan 2023 06:44 )             36.8     01-09    133<L>  |  101  |  22  ----------------------------<  154<H>  4.7   |  22  |  0.88    Ca    9.8      09 Jan 2023 06:44  Phos  2.6     01-09  Mg     1.80     01-09            RADIOLOGY & ADDITIONAL TESTS:  Studies reviewed.

## 2023-01-09 NOTE — PROGRESS NOTE ADULT - PROBLEM SELECTOR PLAN 2
- persistent respiratory failure noted subsequent to thoracentesis on 1/6  - currently maintaining SpO2 > 95% on 2-3 L/min by NC, but oxygen saturation decreases and becomes tachypneic when placed on room air   - respiratory failure is most likely secondary to recurrent L sided pleural effusion    Plan  - will continue to administer oxygen by nasal cannula  - it is most likely that the patient will require oxygen on discharge (baseline home O2 2-3L NC) - persistent respiratory failure noted subsequent to thoracentesis on 1/6  - currently maintaining SpO2 > 95% on 2 L/min by NC, but oxygen saturation decreases and becomes tachypneic when placed on room air and attempts to ambulate   - respiratory failure is most likely secondary to recurrent L sided pleural effusion    Plan  - will continue to administer oxygen by nasal cannula  - it is most likely that the patient will require oxygen on discharge (baseline home O2 2-3L NC)

## 2023-01-09 NOTE — PROGRESS NOTE ADULT - PROBLEM SELECTOR PLAN 5
- reportedly w/ FS 72 since admission and given juice, sandwich, but w/ repeat FS = 77  - Hb A1c 5.7, prediabetic    Plan  - FS glu qAC and qHS as pt with recent hypoglycemia  - low dose ISS, can consider discontinuing if FSG in normal range  - consistent carb diet - reportedly w/ FS 72 since admission and given juice, sandwich, but w/ repeat FS = 77  - Hb A1c 5.7, prediabetic  - patient with AM glu 153, daytime glu 212-232 yesterday    Plan  - FS glu qAC and qHS  - low dose ISS  - consistent carb diet

## 2023-01-09 NOTE — PROGRESS NOTE ADULT - PROBLEM SELECTOR PLAN 3
- with 25 pack year history of cigarette smoking; quit ~ 30 years ago  - followed by [facility]    Plan  - Oncology consulted, no plan for chemotherapy while inpatient  - patient has not initiated treatment for lung cancer yet due to recent diagnosis, but is interested in pursuing as an outpatient

## 2023-01-09 NOTE — PROGRESS NOTE ADULT - PROBLEM SELECTOR PLAN 1
- initially presented with 1 week of worsening JERNIGAN, orthopnea  - diagnosed with stage IV lung adenocarcinoma during recent admission 12/5-12/17  - thoracentesis on 12/12/22 with associated malignant pleural effusion  - CXR with TAVON mass, "moderate left pleural effusion with associated passive atelectasis"    Plan  - Pulmonology performed L sided therapeutic thoracentesis on 1/6 (900 mL removed)       - exudative by Light's criteria with fluid LDH 1389, likely 2/2 NSCLC       - Although pleural fluid LDH > 1000 and pleural glucose < 60, clinical picture is inconsistent with infection       - If patient develops any signs or symptoms of infection, will call back Pulmonology for further drainage   - Now with improvement in SOB s/p thoracentesis, on 3 L O2 via NC  - Pt with recurrent malignant pleural effusion       - Pulmonology recommending inpatient pleurx catheter placement on 1/11 - initially presented with 1 week of worsening JERNIGAN, orthopnea  - diagnosed with stage IV lung adenocarcinoma during recent admission 12/5-12/17  - thoracentesis on 12/12/22 with associated malignant pleural effusion  - CXR with TAVON mass, "moderate left pleural effusion with associated passive atelectasis"    Plan  - Pulmonology performed L sided therapeutic thoracentesis on 1/6 (900 mL removed)       - exudative by Light's criteria with fluid LDH 1389, likely 2/2 NSCLC       - Although pleural fluid LDH > 1000 and pleural glucose < 60, clinical picture is inconsistent with infection       - If patient develops any signs or symptoms of infection, will call back Pulmonology for further drainage   - Now with improvement in SOB s/p thoracentesis, on 2L O2 via NC with SpO2 > 96 today  - Pt with recurrent malignant pleural effusion       - Pulmonology recommending inpatient pleurx catheter placement on 1/11       - Preop labs ordered for 1/11 in AM, COVID on 1/10, NPO at midnight on 1/11, lovenox held 1/10

## 2023-01-09 NOTE — PROGRESS NOTE ADULT - ASSESSMENT
82m with recently diagnosed NSCLC with malignant pleural effusion, no brain mets, PET/CT was done 1/3 but report is pending,  presenting with SOB in the setting of Pleff reaccumulation.     NGS with KRAS G12C mutation  PDL1 30%    Discussed treatment options including chemo/IO, IO alone, KRAS inhibitors.   Pt's son Prabhjot to speak to siblings about options  Pt and family are interested in starting immunotherapy as outpatient.   Will plan for this the week after discharge.      Now s/p Drainage of effusion, SOB improved.  Planned for pleurex placement.     -Appreciate pulm input  -Will plan for immunotherapy as outpatient  -PET/CT reviewed. Would appreciate pulm input on the right sided pleural thickening.   -Pt with metastatic bone disease, can consider bone modifying agents as outpatient but will need dental clearance especially given the dental disease shown on PET/CT.   -Albumin and Ca normal, will monitor  -No plan for inpatient chemo or immunotherapy  -Supportive care, pain control, Nutrition, PT, DVT ppx  -Outpatient oncology f/u    Will follow. Please do not hesitate to call back with questions.     Vivi Segovia MD  Medical Oncology Attending  C: [phone]     time spent on direct patient care, interdisciplinary discussions and chart review.

## 2023-01-09 NOTE — PROGRESS NOTE ADULT - SUBJECTIVE AND OBJECTIVE BOX
Marii Ortiz, PGY1  TEAMS or Pager [phone]     Patient is a 82y old  Male who presents with a chief complaint of Pleural effusion (08 Jan 2023 09:21)      SUBJECTIVE/INTERVAL EVENTS: Patient seen and examined at bedside. Otherwise, denies chest pain, SOB, lightheadedness, nausea, vomiting, constipation, or diarrhea.    MEDICATIONS  (STANDING):  amLODIPine   Tablet 10 milliGRAM(s) Oral daily  aspirin enteric coated 81 milliGRAM(s) Oral daily  atorvastatin 20 milliGRAM(s) Oral at bedtime  dextrose 5%. 1000 milliLiter(s) (50 mL/Hr) IV Continuous <Continuous>  dextrose 5%. 1000 milliLiter(s) (100 mL/Hr) IV Continuous <Continuous>  dextrose 50% Injectable 25 Gram(s) IV Push once  dextrose 50% Injectable 12.5 Gram(s) IV Push once  dextrose 50% Injectable 25 Gram(s) IV Push once  enoxaparin Injectable 40 milliGRAM(s) SubCutaneous once  glucagon  Injectable 1 milliGRAM(s) IntraMuscular once  insulin lispro (ADMELOG) corrective regimen sliding scale   SubCutaneous three times a day before meals  insulin lispro (ADMELOG) corrective regimen sliding scale   SubCutaneous at bedtime  lisinopril 20 milliGRAM(s) Oral daily  melatonin 3 milliGRAM(s) Oral at bedtime    MEDICATIONS  (PRN):  dextrose Oral Gel 15 Gram(s) Oral once PRN Blood Glucose LESS THAN 70 milliGRAM(s)/deciliter      VITAL SIGNS:  T(F): 98.1 (01-08-23 @ 21:56), Max: 98.2 (01-08-23 @ 14:30)  HR: 88 (01-08-23 @ 21:56) (87 - 88)  BP: 148/72 (01-08-23 @ 21:56) (147/72 - 148/72)  RR: 18 (01-08-23 @ 21:56) (18 - 18)  SpO2: 96% (01-08-23 @ 21:56) (95% - 96%)    I&O's Summary    Daily     Daily     PHYSICAL EXAM:  Gen: Alert, NAD  HEENT: NCAT, conjunctiva clear, sclera anicteric, no erythema or exudates in the oropharynx, mmm  Neck: Supple, no JVD  CV: RRR, S1S2, no m/r/g  Resp: CTAB, normal respiratory effort  Abd: Soft, nontender, nondistended, normal bowel sounds  Ext: no edema, no clubbing or cyanosis  Neuro: AOx3, CN2-12 grossly intact, KELLEY  SKIN: warm, perfused    LABS:                        12.3   10.66 )-----------( 171      ( 07 Jan 2023 07:12 )             37.0     Hgb Trend: 12.3<--, 10.9<--, 12.2<--  01-07    134<L>  |  101  |  18  ----------------------------<  119<H>  4.4   |  20<L>  |  0.87    Ca    9.7      07 Jan 2023 07:12  Phos  2.7     01-07  Mg     1.50     01-07      Creatinine Trend: 0.87<--, 0.90<--, 0.98<--, 0.81<--, 0.82<--, 0.89<--              CAPILLARY BLOOD GLUCOSE      POCT Blood Glucose.: 212 mg/dL (08 Jan 2023 21:29)  POCT Blood Glucose.: 232 mg/dL (08 Jan 2023 17:46)  POCT Blood Glucose.: 199 mg/dL (08 Jan 2023 12:23)  POCT Blood Glucose.: 146 mg/dL (08 Jan 2023 08:36)      RADIOLOGY & ADDITIONAL TESTS: Reviewed    Imaging Personally Reviewed:    Consultant(s) Notes Reviewed:      Care Discussed with Consultants/Other Providers:   Marii Ortiz, PGY1  TEAMS or Pager [phone]     Patient is a 82y old  Male who presents with a chief complaint of Pleural effusion (08 Jan 2023 09:21)    [facility]  [facility] #[id number]    SUBJECTIVE/INTERVAL EVENTS: NAOE. Patient seen and examined at bedside. He denies dyspnea on exertion and has been ambulating to the bathroom. His SOB has been improved since thoracentesis on 1/6. He has been tolerating PO intake and denies N/V, diarrhea, or constipation. He is interested in starting treatment for L sided lung adenocarcinoma as soon as possible as an outpatient. Otherwise, denies chest pain, lightheadedness, palpitations, or dysuria.    MEDICATIONS  (STANDING):  amLODIPine   Tablet 10 milliGRAM(s) Oral daily  aspirin enteric coated 81 milliGRAM(s) Oral daily  atorvastatin 20 milliGRAM(s) Oral at bedtime  dextrose 5%. 1000 milliLiter(s) (50 mL/Hr) IV Continuous <Continuous>  dextrose 5%. 1000 milliLiter(s) (100 mL/Hr) IV Continuous <Continuous>  dextrose 50% Injectable 25 Gram(s) IV Push once  dextrose 50% Injectable 12.5 Gram(s) IV Push once  dextrose 50% Injectable 25 Gram(s) IV Push once  enoxaparin Injectable 40 milliGRAM(s) SubCutaneous once  glucagon  Injectable 1 milliGRAM(s) IntraMuscular once  insulin lispro (ADMELOG) corrective regimen sliding scale   SubCutaneous three times a day before meals  insulin lispro (ADMELOG) corrective regimen sliding scale   SubCutaneous at bedtime  lisinopril 20 milliGRAM(s) Oral daily  melatonin 3 milliGRAM(s) Oral at bedtime    MEDICATIONS  (PRN):  dextrose Oral Gel 15 Gram(s) Oral once PRN Blood Glucose LESS THAN 70 milliGRAM(s)/deciliter      VITAL SIGNS:  T(F): 98.1 (01-08-23 @ 21:56), Max: 98.2 (01-08-23 @ 14:30)  HR: 88 (01-08-23 @ 21:56) (87 - 88)  BP: 148/72 (01-08-23 @ 21:56) (147/72 - 148/72)  RR: 18 (01-08-23 @ 21:56) (18 - 18)  SpO2: 96% (01-08-23 @ 21:56) (95% - 96%)    I&O's Summary    Daily     Daily     PHYSICAL EXAM:  Gen: Alert, NAD  HEENT: NCAT, conjunctiva clear, sclera anicteric, no erythema or exudates in the oropharynx, MMM  Neck: Supple, no JVD  CV: RRR, S1S2, no m/r/g  Resp: CTAB, normal respiratory effort, muffled breath sounds at base of L lung posteriorly  Abd: Soft, nontender, nondistended/at baseline, normal bowel sounds  Ext: no edema, no clubbing or cyanosis  Neuro: AOx3, CN2-12 grossly intact, KELLEY  SKIN: warm, perfused    LABS:                        12.3   10.66 )-----------( 171      ( 07 Jan 2023 07:12 )             37.0     Hgb Trend: 12.3<--, 10.9<--, 12.2<--  01-07    134<L>  |  101  |  18  ----------------------------<  119<H>  4.4   |  20<L>  |  0.87    Ca    9.7      07 Jan 2023 07:12  Phos  2.7     01-07  Mg     1.50     01-07      Creatinine Trend: 0.87<--, 0.90<--, 0.98<--, 0.81<--, 0.82<--, 0.89<--    CAPILLARY BLOOD GLUCOSE  POCT Blood Glucose.: 212 mg/dL (08 Jan 2023 21:29)  POCT Blood Glucose.: 232 mg/dL (08 Jan 2023 17:46)  POCT Blood Glucose.: 199 mg/dL (08 Jan 2023 12:23)  POCT Blood Glucose.: 146 mg/dL (08 Jan 2023 08:36)    RADIOLOGY & ADDITIONAL TESTS: Reviewed    Imaging Personally Reviewed: CXR    Consultant(s) Notes Reviewed:  Pulmonology    Care Discussed with Consultants/Other Providers: Pulmonology

## 2023-01-10 NOTE — PROGRESS NOTE ADULT - PROBLEM SELECTOR PLAN 5
- reportedly w/ FS 72 since admission and given juice, sandwich, but w/ repeat FS = 77  - Hb A1c 5.7, prediabetic  - patient with AM glu 153, daytime glu 212-232 yesterday    Plan  - FS glu qAC and qHS  - low dose ISS  - consistent carb diet - reportedly w/ FS 72 since admission and given juice, sandwich, but w/ repeat FS = 77  - Hb A1c 5.7, prediabetic  - patient with AM glu 155, daytime glu 200s yesterday    Plan  - FS glu qAC and qHS  - low dose ISS  - consistent carb diet

## 2023-01-10 NOTE — PROGRESS NOTE ADULT - ASSESSMENT
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 He is currently admitted for evaluation and management of shortness of breath secondary to recurrence of his malignant effusion. At this time, his dyspnea has improved following L sided therapeutic thoracentesis (900 mL) on 1/6. He is pending placement of an indwelling pleural drain by the interventional pulmonology team on 1/11. Oncology following as well, recommending outpatient follow-up to initiate treatment of NSCLC. 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 He is currently admitted for evaluation and management of shortness of breath secondary to recurrence of his malignant effusion. At this time, his dyspnea has improved following L sided therapeutic thoracentesis (900 mL) on 1/6. He is pending placement of an indwelling pleural drain by the interventional pulmonology team on 1/11. Oncology following as well, recommending outpatient follow-up on Thurs 1/19 to initiate immunotherapy for NSCLC.

## 2023-01-10 NOTE — PROGRESS NOTE ADULT - SUBJECTIVE AND OBJECTIVE BOX
Marii Ortiz, PGY1  TEAMS or Pager [phone]     Patient is a 82y old  Male who presents with a chief complaint of Pleural effusion (09 Jan 2023 17:04)      SUBJECTIVE/INTERVAL EVENTS: Patient seen and examined at bedside. Otherwise, denies chest pain, SOB, lightheadedness, nausea, vomiting, constipation, or diarrhea.    MEDICATIONS  (STANDING):  amLODIPine   Tablet 10 milliGRAM(s) Oral daily  atorvastatin 20 milliGRAM(s) Oral at bedtime  dextrose 5%. 1000 milliLiter(s) (50 mL/Hr) IV Continuous <Continuous>  dextrose 5%. 1000 milliLiter(s) (100 mL/Hr) IV Continuous <Continuous>  dextrose 50% Injectable 25 Gram(s) IV Push once  dextrose 50% Injectable 12.5 Gram(s) IV Push once  dextrose 50% Injectable 25 Gram(s) IV Push once  glucagon  Injectable 1 milliGRAM(s) IntraMuscular once  insulin lispro (ADMELOG) corrective regimen sliding scale   SubCutaneous three times a day before meals  insulin lispro (ADMELOG) corrective regimen sliding scale   SubCutaneous at bedtime  lisinopril 20 milliGRAM(s) Oral daily  melatonin 3 milliGRAM(s) Oral at bedtime    MEDICATIONS  (PRN):  dextrose Oral Gel 15 Gram(s) Oral once PRN Blood Glucose LESS THAN 70 milliGRAM(s)/deciliter      VITAL SIGNS:  T(F): 97.7 (01-10-23 @ 05:20), Max: 98.4 (01-09-23 @ 22:05)  HR: 79 (01-10-23 @ 05:20) (79 - 88)  BP: 148/66 (01-10-23 @ 05:20) (134/61 - 148/66)  RR: 19 (01-10-23 @ 05:20) (18 - 20)  SpO2: 98% (01-10-23 @ 05:20) (94% - 98%)    I&O's Summary    Daily     Daily     PHYSICAL EXAM:  Gen: Alert, NAD  HEENT: NCAT, conjunctiva clear, sclera anicteric, no erythema or exudates in the oropharynx, mmm  Neck: Supple, no JVD  CV: RRR, S1S2, no m/r/g  Resp: CTAB, normal respiratory effort  Abd: Soft, nontender, nondistended, normal bowel sounds  Ext: no edema, no clubbing or cyanosis  Neuro: AOx3, CN2-12 grossly intact, KELLEY  SKIN: warm, perfused    LABS:                        12.7   8.45  )-----------( 168      ( 09 Jan 2023 06:44 )             36.8     Hgb Trend: 12.7<--, 12.3<--, 10.9<--, 12.2<--  01-09    133<L>  |  101  |  22  ----------------------------<  154<H>  4.7   |  22  |  0.88    Ca    9.8      09 Jan 2023 06:44  Phos  2.6     01-09  Mg     1.80     01-09      Creatinine Trend: 0.88<--, 0.87<--, 0.90<--, 0.98<--, 0.81<--, 0.82<--              CAPILLARY BLOOD GLUCOSE      POCT Blood Glucose.: 161 mg/dL (09 Jan 2023 21:37)  POCT Blood Glucose.: 226 mg/dL (09 Jan 2023 17:44)  POCT Blood Glucose.: 238 mg/dL (09 Jan 2023 12:10)  POCT Blood Glucose.: 153 mg/dL (09 Jan 2023 08:33)      RADIOLOGY & ADDITIONAL TESTS: Reviewed    Imaging Personally Reviewed:    Consultant(s) Notes Reviewed:      Care Discussed with Consultants/Other Providers:   Marii Ortiz, PGY1  TEAMS or Pager [phone]     Patient is a 82y old  Male who presents with a chief complaint of Pleural effusion (09 Jan 2023 17:04)    [facility]  Juvenc[facility] #[id number]    SUBJECTIVE/INTERVAL EVENTS: NAOE. Patient seen and examined at bedside. He denies SOB with ambulation and is currently breathing comfortably on 2 L NC. He had a BM yesterday and denies constipation, diarrhea, blood in stool. Otherwise, denies chest pain, lightheadedness, nausea, or vomiting.    MEDICATIONS  (STANDING):  amLODIPine   Tablet 10 milliGRAM(s) Oral daily  atorvastatin 20 milliGRAM(s) Oral at bedtime  dextrose 5%. 1000 milliLiter(s) (50 mL/Hr) IV Continuous <Continuous>  dextrose 5%. 1000 milliLiter(s) (100 mL/Hr) IV Continuous <Continuous>  dextrose 50% Injectable 25 Gram(s) IV Push once  dextrose 50% Injectable 12.5 Gram(s) IV Push once  dextrose 50% Injectable 25 Gram(s) IV Push once  glucagon  Injectable 1 milliGRAM(s) IntraMuscular once  insulin lispro (ADMELOG) corrective regimen sliding scale   SubCutaneous three times a day before meals  insulin lispro (ADMELOG) corrective regimen sliding scale   SubCutaneous at bedtime  lisinopril 20 milliGRAM(s) Oral daily  melatonin 3 milliGRAM(s) Oral at bedtime    MEDICATIONS  (PRN):  dextrose Oral Gel 15 Gram(s) Oral once PRN Blood Glucose LESS THAN 70 milliGRAM(s)/deciliter      VITAL SIGNS:  T(F): 97.7 (01-10-23 @ 05:20), Max: 98.4 (01-09-23 @ 22:05)  HR: 79 (01-10-23 @ 05:20) (79 - 88)  BP: 148/66 (01-10-23 @ 05:20) (134/61 - 148/66)  RR: 19 (01-10-23 @ 05:20) (18 - 20)  SpO2: 98% (01-10-23 @ 05:20) (94% - 98%)    I&O's Summary    Daily     Daily     PHYSICAL EXAM:  Gen: Alert, NAD  HEENT: NCAT, conjunctiva clear, sclera anicteric, no erythema or exudates in the oropharynx, MMM  Neck: Supple, no JVD  CV: RRR, S1S2, no m/r/g  Resp: CTAB, normal respiratory effort  Abd: Soft, nontender, nondistended, normal bowel sounds  Ext: no edema, no clubbing or cyanosis  Neuro: AOx3, CN2-12 grossly intact, KELLEY  SKIN: warm, perfused    LABS:                        12.7   8.45  )-----------( 168      ( 09 Jan 2023 06:44 )             36.8     Hgb Trend: 12.7<--, 12.3<--, 10.9<--, 12.2<--  01-09    133<L>  |  101  |  22  ----------------------------<  154<H>  4.7   |  22  |  0.88    Ca    9.8      09 Jan 2023 06:44  Phos  2.6     01-09  Mg     1.80     01-09      Creatinine Trend: 0.88<--, 0.87<--, 0.90<--, 0.98<--, 0.81<--, 0.82<--      CAPILLARY BLOOD GLUCOSE  POCT Blood Glucose.: 161 mg/dL (09 Jan 2023 21:37)  POCT Blood Glucose.: 226 mg/dL (09 Jan 2023 17:44)  POCT Blood Glucose.: 238 mg/dL (09 Jan 2023 12:10)  POCT Blood Glucose.: 153 mg/dL (09 Jan 2023 08:33)      RADIOLOGY & ADDITIONAL TESTS: Reviewed    Imaging Personally Reviewed:    Consultant(s) Notes Reviewed:      Care Discussed with Consultants/Other Providers:

## 2023-01-10 NOTE — PROGRESS NOTE ADULT - SUBJECTIVE AND OBJECTIVE BOX
INTERVENTIONAL PULMONOLOGY CONSULT NOTE    HPI:  81 yo M with PMH T2DM, HTN, HLD, dengue infection, and metastatic lung adeno (dx through thoracentesis) who presents with increasing sob. Reports that he has felt progressive sob and decrease in exercise tolerance over past week. No fever, chills, chest pain, pleuritic pain. Feels similar to when he needed a thoracentesis in the past. Uses home O2 but unsure how many lpm.   Of note, dx with metastatic lung adeno via thoracentesis on 12/8/22. He was d/c home with oncology f/u. He was recommended to have PET scan, liquid bx. He has not yet seen OP pulm.   Pulmonary initially saw patient on 1/6/23 and performed thoracentesis for symptomatic relief with 800 ml pleural fluid drained. Interventional pulmonology consulted for pleurx catheter placement.      PAST MEDICAL & SURGICAL HISTORY:  Type II diabetes mellitus      Hypertension      Dengue fever      Pleural effusion, left      Dyslipidemia      On home oxygen therapy      Lung cancer      Gait difficulty      History of thoracentesis          FAMILY HISTORY:  FH: type 2 diabetes (Sibling)    FH: HTN (hypertension) (Sibling)          SOCIAL HISTORY:  Smoking: [ ] Never Smoked [x] Former Smoker (__ packs x ___ years) [ ] Current Smoker  (__ packs x ___ years)  Substance Use: [ ] Never Used [ ] Used ____  EtOH Use:  Marital Status: [ ] Single [ ]  [ ]  [ ]   Sexual History:   Occupation:  Recent Travel: none  Country of Birth: [place]  Advance Directives:    Allergies    No Known Allergies    Intolerances        HOME MEDICATIONS:  Home Medications:  amLODIPine 10 mg oral tablet: 1 tab(s) orally once a day (06 Jan 2023 01:22)  atorvastatin 20 mg oral tablet: 1 tab(s) orally once a day (06 Jan 2023 01:22)  lisinopril 20 mg oral tablet: 1 tab(s) orally once a day (06 Jan 2023 01:22)      REVIEW OF SYSTEMS:  All systems negative except as documented above.    OBJECTIVE:  ICU Vital Signs Last 24 Hrs  T(C): 36.3 (11 Jan 2023 05:15), Max: 36.8 (10 Lars 2023 12:57)  T(F): 97.3 (11 Jan 2023 05:15), Max: 98.2 (10 Lars 2023 12:57)  HR: 85 (11 Jan 2023 05:15) (84 - 106)  BP: 156/72 (11 Jan 2023 05:15) (143/63 - 169/70)  BP(mean): --  ABP: --  ABP(mean): --  RR: 17 (11 Jan 2023 05:15) (17 - 18)  SpO2: 97% (11 Jan 2023 05:15) (94% - 97%)    O2 Parameters below as of 11 Jan 2023 05:15  Patient On (Oxygen Delivery Method): nasal cannula  O2 Flow (L/min): 2            CAPILLARY BLOOD GLUCOSE      POCT Blood Glucose.: 235 mg/dL (11 Jan 2023 05:57)      PHYSICAL EXAM:  General: NAD  HEENT: PERRL, EOMI, sclera anicteric, moist mucus membranes  Neck: supple  Cardiovascular: RRR  Respiratory: decreased left side breath sounds  Abdomen: soft  Extremities: warm and well perfused, no edema, no clubbing  Skin: no rashes  Neurological: AOx3, no focal deficits    HOSPITAL MEDICATIONS:  Standing Meds:  amLODIPine   Tablet 10 milliGRAM(s) Oral daily  atorvastatin 20 milliGRAM(s) Oral at bedtime  dextrose 5%. 1000 milliLiter(s) IV Continuous <Continuous>  dextrose 5%. 1000 milliLiter(s) IV Continuous <Continuous>  dextrose 50% Injectable 25 Gram(s) IV Push once  dextrose 50% Injectable 12.5 Gram(s) IV Push once  dextrose 50% Injectable 25 Gram(s) IV Push once  glucagon  Injectable 1 milliGRAM(s) IntraMuscular once  insulin lispro (ADMELOG) corrective regimen sliding scale   SubCutaneous every 6 hours  lisinopril 30 milliGRAM(s) Oral daily  melatonin 3 milliGRAM(s) Oral at bedtime  pantoprazole    Tablet 40 milliGRAM(s) Oral before breakfast      PRN Meds:  dextrose Oral Gel 15 Gram(s) Oral once PRN      LABS:                        13.3   9.22  )-----------( 181      ( 10 Lars 2023 06:50 )             39.3     Hgb Trend: 13.3<--, 12.7<--, 12.3<--, 10.9<--, 12.2<--  01-10    133<L>  |  101  |  25<H>  ----------------------------<  178<H>  4.6   |  20<L>  |  0.88    Ca    9.9      10 Lars 2023 06:50  Phos  3.1     01-10  Mg     1.80     01-10    TPro  7.1  /  Alb  3.9  /  TBili  0.5  /  DBili  x   /  AST  32  /  ALT  20  /  AlkPhos  89  01-10    Creatinine Trend: 0.88<--, 0.88<--, 0.87<--, 0.90<--, 0.98<--, 0.81<--  PT/INR - ( 10 Lars 2023 06:50 )   PT: 12.2 sec;   INR: 1.05 ratio         PTT - ( 10 Lars 2023 06:50 )  PTT:55.0 sec          MICROBIOLOGY:       RADIOLOGY:  [x] Reviewed and interpreted by me

## 2023-01-10 NOTE — PROGRESS NOTE ADULT - ATTENDING COMMENTS
Patient with recurrent malignant pleural effusion, complicated, referered to the IP team for consideration for tunneled pleural catheter placement. Patient already on treatment, and symptomatic, most recent drainage last week, has already re-accumulated. Patient will need a tunneled pleural catheter placement, planned for today. Risks of the procedure including but not limited to pneumothorax and bleeding as well as long term risk of infecion. discussed, and possible interventions in case those are noted also discussed extensively. After thorough risk benefit discussion and answering all questions related to the procedure, patient is agreeable to proceed with proposed intervention. Also discussed extensively with the son.

## 2023-01-10 NOTE — CHART NOTE - NSCHARTNOTEFT_GEN_A_CORE
Chart reviewed. Patient seen and examined at bedside.   COVID PCR in lab 1/10  INR 1.05    Please send type and screen today  Please send full set of labs tomorrow AM including coag panel  Please make NPO after midnight  Please hold all AC after midnight tonight  Patient scheduled for Pleurx catheter placement 1/11 at 1400  Patient amenable to proceed with procedure

## 2023-01-10 NOTE — PROGRESS NOTE ADULT - PROBLEM SELECTOR PLAN 2
- persistent respiratory failure noted subsequent to thoracentesis on 1/6  - currently maintaining SpO2 > 95% on 2 L/min by NC, but oxygen saturation decreases and becomes tachypneic when placed on room air and attempts to ambulate   - respiratory failure is most likely secondary to recurrent L sided pleural effusion    Plan  - will continue to administer oxygen by nasal cannula  - it is most likely that the patient will require oxygen on discharge (baseline home O2 2-3L NC) - persistent respiratory failure noted subsequent to thoracentesis on 1/6  - currently maintaining SpO2 > 95% on 2 L/min by NC, but oxygen saturation decreases and becomes tachypneic when placed on room air and attempts to ambulate   - respiratory failure is most likely secondary to recurrent L sided pleural effusion    Plan  - will continue to administer oxygen by nasal cannula with goal SpO2 > 92%  - it is most likely that the patient will require oxygen on discharge (baseline home O2 2-3L NC)

## 2023-01-10 NOTE — PROGRESS NOTE ADULT - PROBLEM SELECTOR PLAN 3
- with 25 pack year history of cigarette smoking; quit ~ 30 years ago  - followed by [facility]    Plan  - Oncology consulted, no plan for chemotherapy while inpatient  - patient has not initiated treatment for lung cancer yet due to recent diagnosis, but is interested in pursuing as an outpatient - with 25 pack year history of cigarette smoking; quit ~ 30 years ago  - followed by [facility]    Plan  - Oncology consulted, no plan for chemotherapy while inpatient  - patient has not initiated treatment for lung cancer yet due to recent diagnosis  - son reports appointment scheduled on 1/19 to begin immunotherapy

## 2023-01-10 NOTE — PROGRESS NOTE ADULT - PROBLEM SELECTOR PLAN 4
- Sys BP elevated 130s-150s recently    Plan  - c/w amlodipine 10 mg and lisinopril 20 mg (home meds)  - continue to monitor  - discontinue ASA 81 mg, pt was taking for primary ppx and denies hx of CAD/MI/CVA - Sys BP elevated 130s-150s recently    Plan  - c/w amlodipine 10 mg daily  - increase lisinopril from 20 mg to 30 mg daily  - discontinue ASA 81 mg, pt was taking for primary ppx and denies hx of CAD/MI/CVA

## 2023-01-10 NOTE — PROGRESS NOTE ADULT - PROBLEM SELECTOR PLAN 6
- DVT ppx: lovenox 40 SQ daily  - Diet: consistent carb  - PT evaluation: home with no needs  - Dispo: pending course - DVT ppx: lovenox 40 SQ daily (held today for procedure tomorrow)  - Diet: consistent carb  - PT evaluation: home with no needs  - Dispo: pending course  - will need home oxygen and education on pleural catheter

## 2023-01-10 NOTE — PROGRESS NOTE ADULT - ATTENDING COMMENTS
Patient seen and examined, care plan discussed with house staff as above.    82yoM presenting with SOB, found to have AHRF 2/2 re-accumulation of exudative pleural effusion due to his newly diagnosed (12/2022) metastatic adenocarcinoma of the lung (not yet started on treatment). S/p thoracentesis on 1/6 w 900 ml of exudative pleural fluid drained with hemorrhagic component. Pulm to place pleurex on 1/11. Plan for d/c home 1/11 or 1/12 with close Onc follow up.    Will clarify why patient is on baby asa. No documentation of CAD in chart, clarified that there's no hx of CAD, d/c asa. Planning to hold dvt ppx day of procedure.

## 2023-01-10 NOTE — PROGRESS NOTE ADULT - ASSESSMENT
83 yo M with PMH T2DM, HTN, HLD, dengue infection, and metastatic lung adeno (dx through thoracentesis) who presents with increasing sob. Found to have recurrent malignant pleural effusion, s/p thoracentesis with 800 ml pleural fluid drained on 1/6/23. Interventional pulmonology consulted for pleurx catheter placement.    # malignant pleural effusion  Chart reviewed. Patient seen and examined at bedside.   COVID PCR in lab 1/10  INR 1.05    Please send type and screen today  Please send full set of labs tomorrow AM including coag panel  Please make NPO after midnight  Please hold all AC after midnight tonight  Patient scheduled for Pleurx catheter placement 1/11 at 1400  Patient amenable to proceed with procedure.

## 2023-01-10 NOTE — CHART NOTE - NSCHARTNOTEFT_GEN_A_CORE
: Wan Garcia    INDICATION: pleural effusion    PROCEDURE:  [ ] LIMITED ECHO  [x] LIMITED CHEST  [ ] LIMITED RETROPERITONEAL  [ ] LIMITED ABDOMINAL  [ ] LIMITED DVT  [ ] NEEDLE GUIDANCE VASCULAR  [ ] NEEDLE GUIDANCE THORACENTESIS  [ ] NEEDLE GUIDANCE PARACENTESIS  [ ] NEEDLE GUIDANCE PERICARDIOCENTESIS  [ ] OTHER    FINDINGS:  large left pleural effusion, simple appearing      INTERPRETATION:  large left pleural effusion, space suitable for pleurx insertion    Images uploaded on [employer]

## 2023-01-10 NOTE — PROGRESS NOTE ADULT - SUBJECTIVE AND OBJECTIVE BOX
Patient is a 82y old  Male who presents with a chief complaint of Pleural effusion (10 Lars 2023 06:08)      HPI:  82 year old male, with past history significant for Pleural effusion, Lung cancer Home oxygen use (2 liters), HTN, DM-2, HLD, and Dengue fever, presented to the ED secondary to worsening difficulty in breathing.  Seen and evaluated at bedside; tachypneic at times to low 30's, but no signs of acute distress.  Via , patient endorses breathing difficulties, including worsening shortness of breath, decreased exercise endurance, and occasional palpitations for the past ~ one week.  Uses 2 pillows nightly.  States feeling as if fluid has accumulated in the lungs.  No chest pain.  No edema.  No coughing.  Per report from family, patient's home oxygen was increased to 3 liters because of increased work of breathing, pronounced over the recent hours.      Vital signs upon ED presentation as follows: BP = 161/82, HR = 91, RR = 30, T = 36.7 C (98 F), O2 Sat = 94% on RA (to 100% on 3L NC).  Diagnosed with Pleural Effusion in the ED. (05 Jan 2023 21:10)      PAST MEDICAL & SURGICAL HISTORY:  Type II diabetes mellitus      Hypertension      Dengue fever      Pleural effusion, left      Dyslipidemia      On home oxygen therapy      Lung cancer      Gait difficulty      History of thoracentesis          MEDICATIONS  (STANDING):  amLODIPine   Tablet 10 milliGRAM(s) Oral daily  atorvastatin 20 milliGRAM(s) Oral at bedtime  dextrose 5%. 1000 milliLiter(s) (50 mL/Hr) IV Continuous <Continuous>  dextrose 5%. 1000 milliLiter(s) (100 mL/Hr) IV Continuous <Continuous>  dextrose 50% Injectable 25 Gram(s) IV Push once  dextrose 50% Injectable 12.5 Gram(s) IV Push once  dextrose 50% Injectable 25 Gram(s) IV Push once  glucagon  Injectable 1 milliGRAM(s) IntraMuscular once  insulin lispro (ADMELOG) corrective regimen sliding scale   SubCutaneous three times a day before meals  insulin lispro (ADMELOG) corrective regimen sliding scale   SubCutaneous at bedtime  lisinopril 30 milliGRAM(s) Oral daily  melatonin 3 milliGRAM(s) Oral at bedtime  pantoprazole    Tablet 40 milliGRAM(s) Oral before breakfast    MEDICATIONS  (PRN):  dextrose Oral Gel 15 Gram(s) Oral once PRN Blood Glucose LESS THAN 70 milliGRAM(s)/deciliter      Allergies    No Known Allergies    Intolerances        FAMILY HISTORY:  FH: type 2 diabetes (Sibling)    FH: HTN (hypertension) (Sibling)        *SOCIAL HISTORY: (guardian or who pt came with), (smoking hx)    *Last Dental Visit:    Vital Signs Last 24 Hrs  T(C): 36.8 (10 Lars 2023 12:57), Max: 36.9 (09 Jan 2023 22:05)  T(F): 98.2 (10 Lars 2023 12:57), Max: 98.4 (09 Jan 2023 22:05)  HR: 84 (10 Lars 2023 12:57) (79 - 84)  BP: 143/63 (10 Lars 2023 12:57) (134/61 - 148/66)  BP(mean): --  RR: 18 (10 Lars 2023 12:57) (18 - 20)  SpO2: 96% (10 Lars 2023 12:57) (94% - 98%)    Parameters below as of 10 Lars 2023 12:57  Patient On (Oxygen Delivery Method): nasal cannula  O2 Flow (L/min): 2      EOE:              Mandible FROM             Facial bones and MOM grossly intact             ( - ) trismus             ( - ) swelling             ( - ) asymmetry             ( - ) palpation             ( - ) SOB             ( - ) dysphagia             ( - ) LOC    IOE:  permanent multiple carious teeth           hard/soft palate:  ( - ) palatal torus           labial/buccal mucosa WNL           ( - ) percussion           ( - ) palpation           ( - ) swelling       LABS:                        13.3   9.22  )-----------( 181      ( 10 Lars 2023 06:50 )             39.3     01-10    133<L>  |  101  |  25<H>  ----------------------------<  178<H>  4.6   |  20<L>  |  0.88    Ca    9.9      10 Lars 2023 06:50  Phos  3.1     01-10  Mg     1.80     01-10    TPro  7.1  /  Alb  3.9  /  TBili  0.5  /  DBili  x   /  AST  32  /  ALT  20  /  AlkPhos  89  01-10    WBC Count: 9.22 K/uL [3.80 - 10.50] (01-10 @ 06:50)  Platelet Count - Automated: 181 K/uL [150 - 400] (01-10 @ 06:50)  INR: 1.05 ratio [0.88 - 1.16] (01-10 @ 06:50)  WBC Count: 8.45 K/uL [3.80 - 10.50] (01-09 @ 06:44)  Platelet Count - Automated: 168 K/uL [150 - 400] (01-09 @ 06:44)    ASSESSMENT:  Multiple fractured/ worn teeth. 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   No mobile teeth.   Low risk for aspiration during intubation.    RECOMMENDATIONS:  1) Intubate with caution  2) Dental F/U with outpatient dentist for comprehensive dental care.     Sherri Dunn DDS #[id number]

## 2023-01-10 NOTE — PROGRESS NOTE ADULT - PROBLEM SELECTOR PLAN 1
- initially presented with 1 week of worsening JERNIGAN, orthopnea  - diagnosed with stage IV lung adenocarcinoma during recent admission 12/5-12/17  - thoracentesis on 12/12/22 with associated malignant pleural effusion  - CXR with TAVON mass, "moderate left pleural effusion with associated passive atelectasis"    Plan  - Pulmonology performed L sided therapeutic thoracentesis on 1/6 (900 mL removed)       - exudative by Light's criteria with fluid LDH 1389, likely 2/2 NSCLC       - Although pleural fluid LDH > 1000 and pleural glucose < 60, clinical picture is inconsistent with infection       - If patient develops any signs or symptoms of infection, will call back Pulmonology for further drainage   - Now with improvement in SOB s/p thoracentesis, on 2L O2 via NC with SpO2 > 96 today  - Pt with recurrent malignant pleural effusion       - Pulmonology recommending inpatient pleurx catheter placement on 1/11       - Preop labs ordered for 1/11 in AM, COVID on 1/10, NPO at midnight on 1/11, lovenox held 1/10 - initially presented with 1 week of worsening JERNIGAN, orthopnea  - diagnosed with stage IV lung adenocarcinoma during recent admission 12/5-12/17  - thoracentesis on 12/12/22 with associated malignant pleural effusion  - CXR with TAVON mass, "moderate left pleural effusion with associated passive atelectasis"    Plan  - Pulmonology performed L sided therapeutic thoracentesis on 1/6 (900 mL removed)       - exudative by Light's criteria with fluid LDH 1389, likely 2/2 NSCLC       - Although pleural fluid LDH > 1000 and pleural glucose < 60, clinical picture is inconsistent with infection       - If patient develops any signs or symptoms of infection, will call back Pulmonology for further drainage   - Now with improvement in SOB s/p thoracentesis, on 2L O2 via NC with SpO2 > 96 today  - Pt with recurrent malignant pleural effusion       - Pulmonology recommending inpatient pleurx catheter placement on 1/11       - Preop labs ordered for 1/11 in AM, COVID pending on 1/10, NPO at midnight on 1/11, lovenox held 1/10       - Anesthesia requesting dental evaluation for poor dentition, consult placed

## 2023-01-11 NOTE — PROGRESS NOTE ADULT - PROBLEM SELECTOR PLAN 5
- reportedly w/ FS 72 since admission and given juice, sandwich, but w/ repeat FS = 77  - Hb A1c 5.7, prediabetic  - patient with AM glu 155, daytime glu 200s yesterday    Plan  - FS glu qAC and qHS  - low dose ISS  - consistent carb diet - reportedly w/ FS 72 since admission and given juice, sandwich, but w/ repeat FS = 77  - Hb A1c 5.7, prediabetic  - patient with AM glu 235 today, daytime glu 261-356 yesterday, required 11 units ISS  - hyperglycemia likely due to patient eating outside food and frequent snacks    Plan  - FS glu q6h while NPO, then qAC and qHS when diet resumed after procedure  - low dose ISS   - consistent carb diet  - encourage patient to limit snacks

## 2023-01-11 NOTE — PROGRESS NOTE ADULT - SUBJECTIVE AND OBJECTIVE BOX
Marii Ortiz, PGY1  TEAMS or Pager [phone]     Patient is a 82y old  Male who presents with a chief complaint of Pleural effusion (10 Lars 2023 17:22)      SUBJECTIVE/INTERVAL EVENTS: Patient seen and examined at bedside. Otherwise, denies chest pain, SOB, lightheadedness, nausea, vomiting, constipation, or diarrhea.    MEDICATIONS  (STANDING):  amLODIPine   Tablet 10 milliGRAM(s) Oral daily  atorvastatin 20 milliGRAM(s) Oral at bedtime  dextrose 5%. 1000 milliLiter(s) (50 mL/Hr) IV Continuous <Continuous>  dextrose 5%. 1000 milliLiter(s) (100 mL/Hr) IV Continuous <Continuous>  dextrose 50% Injectable 25 Gram(s) IV Push once  dextrose 50% Injectable 12.5 Gram(s) IV Push once  dextrose 50% Injectable 25 Gram(s) IV Push once  glucagon  Injectable 1 milliGRAM(s) IntraMuscular once  insulin lispro (ADMELOG) corrective regimen sliding scale   SubCutaneous every 6 hours  lisinopril 30 milliGRAM(s) Oral daily  melatonin 3 milliGRAM(s) Oral at bedtime  pantoprazole    Tablet 40 milliGRAM(s) Oral before breakfast    MEDICATIONS  (PRN):  dextrose Oral Gel 15 Gram(s) Oral once PRN Blood Glucose LESS THAN 70 milliGRAM(s)/deciliter      VITAL SIGNS:  T(F): 97.3 (01-11-23 @ 05:15), Max: 98.2 (01-10-23 @ 12:57)  HR: 85 (01-11-23 @ 05:15) (84 - 106)  BP: 156/72 (01-11-23 @ 05:15) (143/63 - 169/70)  RR: 17 (01-11-23 @ 05:15) (17 - 18)  SpO2: 97% (01-11-23 @ 05:15) (94% - 97%)    I&O's Summary    Daily     Daily     PHYSICAL EXAM:  Gen: Alert, NAD  HEENT: NCAT, conjunctiva clear, sclera anicteric, no erythema or exudates in the oropharynx, mmm  Neck: Supple, no JVD  CV: RRR, S1S2, no m/r/g  Resp: CTAB, normal respiratory effort  Abd: Soft, nontender, nondistended, normal bowel sounds  Ext: no edema, no clubbing or cyanosis  Neuro: AOx3, CN2-12 grossly intact, KELLYE  SKIN: warm, perfused    LABS:                        13.3   9.22  )-----------( 181      ( 10 Lars 2023 06:50 )             39.3     Hgb Trend: 13.3<--, 12.7<--, 12.3<--, 10.9<--, 12.2<--  01-10    133<L>  |  101  |  25<H>  ----------------------------<  178<H>  4.6   |  20<L>  |  0.88    Ca    9.9      10 Lars 2023 06:50  Phos  3.1     01-10  Mg     1.80     01-10    TPro  7.1  /  Alb  3.9  /  TBili  0.5  /  DBili  x   /  AST  32  /  ALT  20  /  AlkPhos  89  01-10    Creatinine Trend: 0.88<--, 0.88<--, 0.87<--, 0.90<--, 0.98<--, 0.81<--  LIVER FUNCTIONS - ( 10 Lars 2023 06:50 )  Alb: 3.9 g/dL / Pro: 7.1 g/dL / ALK PHOS: 89 U/L / ALT: 20 U/L / AST: 32 U/L / GGT: x           PT/INR - ( 10 Lars 2023 06:50 )   PT: 12.2 sec;   INR: 1.05 ratio         PTT - ( 10 Lars 2023 06:50 )  PTT:55.0 sec          CAPILLARY BLOOD GLUCOSE      POCT Blood Glucose.: 342 mg/dL (10 Lars 2023 21:16)  POCT Blood Glucose.: 356 mg/dL (10 Lars 2023 17:51)  POCT Blood Glucose.: 261 mg/dL (10 Lars 2023 12:11)  POCT Blood Glucose.: 155 mg/dL (10 Lars 2023 08:29)      RADIOLOGY & ADDITIONAL TESTS: Reviewed    Imaging Personally Reviewed:    Consultant(s) Notes Reviewed:      Care Discussed with Consultants/Other Providers:   Marii Ortiz, PGY1  TEAMS or Pager [phone]     Patient is a 82y old  Male who presents with a chief complaint of Pleural effusion (10 Lars 2023 17:22)    [facility]  Irene #[id number]    SUBJECTIVE/INTERVAL EVENTS: Overnight, patient found to have no loose teeth on dental evaluation. He has been NPO since 12 am for pleurx today. Patient seen and examined at bedside today. He is breathing comfortably on 2L O2 via NC. He reports that he mostly has been eating the hospital meals and completes most of each tray. He does admit to eating dinner brought in my family yesterday and snacking on fruit throughout the day. He reports that he had a normal BM this morning. Otherwise, denies chest pain, lightheadedness, nausea, vomiting, constipation, or diarrhea.    MEDICATIONS  (STANDING):  amLODIPine   Tablet 10 milliGRAM(s) Oral daily  atorvastatin 20 milliGRAM(s) Oral at bedtime  dextrose 5%. 1000 milliLiter(s) (50 mL/Hr) IV Continuous <Continuous>  dextrose 5%. 1000 milliLiter(s) (100 mL/Hr) IV Continuous <Continuous>  dextrose 50% Injectable 25 Gram(s) IV Push once  dextrose 50% Injectable 12.5 Gram(s) IV Push once  dextrose 50% Injectable 25 Gram(s) IV Push once  glucagon  Injectable 1 milliGRAM(s) IntraMuscular once  insulin lispro (ADMELOG) corrective regimen sliding scale   SubCutaneous every 6 hours  lisinopril 30 milliGRAM(s) Oral daily  melatonin 3 milliGRAM(s) Oral at bedtime  pantoprazole    Tablet 40 milliGRAM(s) Oral before breakfast    MEDICATIONS  (PRN):  dextrose Oral Gel 15 Gram(s) Oral once PRN Blood Glucose LESS THAN 70 milliGRAM(s)/deciliter      VITAL SIGNS:  T(F): 97.3 (01-11-23 @ 05:15), Max: 98.2 (01-10-23 @ 12:57)  HR: 85 (01-11-23 @ 05:15) (84 - 106)  BP: 156/72 (01-11-23 @ 05:15) (143/63 - 169/70)  RR: 17 (01-11-23 @ 05:15) (17 - 18)  SpO2: 97% (01-11-23 @ 05:15) (94% - 97%)    I&O's Summary    Daily     Daily     PHYSICAL EXAM:  Gen: Alert, NAD  HEENT: NCAT, conjunctiva clear, sclera anicteric, MMM  Neck: Supple, no JVD  CV: RRR, S1S2, no m/r/g, distant heart sounds  Resp: CTAB, normal respiratory effort, barrel chest  Abd: Soft, nontender, nondistended, normal bowel sounds  Ext: no edema, no clubbing or cyanosis  Neuro: AOx3, CN2-12 grossly intact, KELLEY  SKIN: warm, perfused    LABS:                        13.3   9.22  )-----------( 181      ( 10 Lars 2023 06:50 )             39.3     Hgb Trend: 13.3<--, 12.7<--, 12.3<--, 10.9<--, 12.2<--  01-10    133<L>  |  101  |  25<H>  ----------------------------<  178<H>  4.6   |  20<L>  |  0.88    Ca    9.9      10 Lars 2023 06:50  Phos  3.1     01-10  Mg     1.80     01-10    TPro  7.1  /  Alb  3.9  /  TBili  0.5  /  DBili  x   /  AST  32  /  ALT  20  /  AlkPhos  89  01-10    Creatinine Trend: 0.88<--, 0.88<--, 0.87<--, 0.90<--, 0.98<--, 0.81<--  LIVER FUNCTIONS - ( 10 Lars 2023 06:50 )  Alb: 3.9 g/dL / Pro: 7.1 g/dL / ALK PHOS: 89 U/L / ALT: 20 U/L / AST: 32 U/L / GGT: x           PT/INR - ( 10 Lars 2023 06:50 )   PT: 12.2 sec;   INR: 1.05 ratio      PTT - ( 10 Lars 2023 06:50 )  PTT:55.0 sec      CAPILLARY BLOOD GLUCOSE  POCT Blood Glucose.: 342 mg/dL (10 Lars 2023 21:16)  POCT Blood Glucose.: 356 mg/dL (10 Lars 2023 17:51)  POCT Blood Glucose.: 261 mg/dL (10 Lars 2023 12:11)  POCT Blood Glucose.: 155 mg/dL (10 Lars 2023 08:29)      RADIOLOGY & ADDITIONAL TESTS: Reviewed    Imaging Personally Reviewed:     Consultant(s) Notes Reviewed:  Pulmonology, Interventional Pulmonology    Care Discussed with Consultants/Other Providers:

## 2023-01-11 NOTE — PROGRESS NOTE ADULT - PROBLEM SELECTOR PLAN 2
- persistent respiratory failure noted subsequent to thoracentesis on 1/6  - currently maintaining SpO2 > 95% on 2 L/min by NC, but oxygen saturation decreases and becomes tachypneic when placed on room air and attempts to ambulate   - respiratory failure is most likely secondary to recurrent L sided pleural effusion    Plan  - will continue to administer oxygen by nasal cannula with goal SpO2 > 92%  - it is most likely that the patient will require oxygen on discharge (baseline home O2 2-3L NC)

## 2023-01-11 NOTE — PROGRESS NOTE ADULT - PROBLEM SELECTOR PLAN 4
- Sys BP elevated 130s-150s recently    Plan  - c/w amlodipine 10 mg daily  - increase lisinopril from 20 mg to 30 mg daily  - discontinue ASA 81 mg, pt was taking for primary ppx and denies hx of CAD/MI/CVA - Sys BP elevated 140s-160s recently    Plan  - c/w amlodipine 10 mg daily  - increase lisinopril from 30 mg to 40 mg daily  - discontinue ASA 81 mg, pt was taking for primary ppx and denies hx of CAD/MI/CVA

## 2023-01-11 NOTE — CONSULT NOTE ADULT - ASSESSMENT
81 yo M with PMH T2DM, HTN, HLD, dengue infection, and metastatic lung adeno (dx through thoracentesis) who presents with increasing sob. Found to have recurrent malignant pleural effusion, s/p thoracentesis with 800 ml pleural fluid drained on 1/6/23. Interventional pulmonology consulted for pleurx catheter placement.    # malignant pleural effusion  - plan for PleurX catheter placement today with Dr. Morales    81 yo M with PMH T2DM, HTN, HLD, dengue infection, and metastatic lung adeno (dx through thoracentesis) who presents with increasing sob. Found to have recurrent malignant pleural effusion, s/p thoracentesis with 800 ml pleural fluid drained on 1/6/23. Interventional pulmonology consulted for pleurx catheter placement.    # malignant pleural effusion  - plan for PleurX catheter placement today with Dr. Morales   - will need outpatient pulmonology follow up   - can resume diet post procedure  81 yo M with PMH T2DM, HTN, HLD, dengue infection, and metastatic lung adeno (dx through thoracentesis) who presents with increasing sob. Found to have recurrent malignant pleural effusion, s/p thoracentesis with 800 ml pleural fluid drained on 1/6/23. Interventional pulmonology consulted for pleurx catheter placement.    # malignant pleural effusion  - s/p PleurX catheter placement today with Dr. Morales   - 600 ml pleural fluid drained today  - can continue to drain pleurx inpatient every other day up to 1L, stop if symptomatic (cough, chest pain)  - will need outpatient pulmonology follow up   - can resume diet post procedure

## 2023-01-11 NOTE — PROGRESS NOTE ADULT - PROBLEM SELECTOR PLAN 6
- DVT ppx: lovenox 40 SQ daily (held today for procedure today)  - Diet: consistent carb  - PT evaluation: home with no needs  - Dispo: pending course  - will need home oxygen and education on pleural catheter - DVT ppx: lovenox 40 SQ daily (held today for procedure today)  - Diet: consistent carb  - PT evaluation: home with no needs  - Dispo: pending course  - CM following, pt will need home oxygen and education on pleural catheter    Updated son Rash via phone ([phone])

## 2023-01-11 NOTE — PROGRESS NOTE ADULT - ASSESSMENT
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 He is currently admitted for evaluation and management of shortness of breath secondary to recurrence of his malignant effusion. At this time, his dyspnea has improved following L sided therapeutic thoracentesis (900 mL) on 1/6. He is pending placement of an indwelling pleural drain by the interventional pulmonology team on 1/11. Oncology following as well, recommending outpatient follow-up on Thurs 1/19 to initiate immunotherapy for NSCLC. 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 He is currently admitted for evaluation and management of shortness of breath secondary to recurrence of his malignant effusion. At this time, his dyspnea has improved following L sided therapeutic thoracentesis (900 mL) on 1/6. He is pending placement of an indwelling pleural drain by the interventional pulmonology team on 1/11 (today). He was cleared by the dental team yesterday with no loose teeth noted. Oncology following as well, recommending outpatient follow-up on Thurs 1/19 to initiate immunotherapy for NSCLC.

## 2023-01-11 NOTE — PROGRESS NOTE ADULT - PROBLEM SELECTOR PLAN 3
- with 25 pack year history of cigarette smoking; quit ~ 30 years ago  - followed by [facility]    Plan  - Oncology consulted, no plan for chemotherapy while inpatient  - patient has not initiated treatment for lung cancer yet due to recent diagnosis  - son reports appointment scheduled on 1/19 to begin immunotherapy

## 2023-01-11 NOTE — PROGRESS NOTE ADULT - PROBLEM SELECTOR PLAN 1
- initially presented with 1 week of worsening JERNIGAN, orthopnea  - diagnosed with stage IV lung adenocarcinoma during recent admission 12/5-12/17  - thoracentesis on 12/12/22 with associated malignant pleural effusion  - CXR with TAVON mass, "moderate left pleural effusion with associated passive atelectasis"    Plan  - Pulmonology performed L sided therapeutic thoracentesis on 1/6 (900 mL removed)       - exudative by Light's criteria with fluid LDH 1389, likely 2/2 NSCLC       - Although pleural fluid LDH > 1000 and pleural glucose < 60, clinical picture is inconsistent with infection       - If patient develops any signs or symptoms of infection, will call back Pulmonology for further drainage   - Now with improvement in SOB s/p thoracentesis, on 2L O2 via NC with SpO2 > 96 today  - Pt with recurrent malignant pleural effusion       - Pulmonology recommending inpatient pleurx catheter placement on 1/11       - Preop labs ordered for 1/11 in AM, COVID pending on 1/10, NPO at midnight on 1/11, lovenox held 1/10       - Anesthesia requesting dental evaluation for poor dentition, consult placed - initially presented with 1 week of worsening JERNIGAN, orthopnea  - diagnosed with stage IV lung adenocarcinoma during recent admission 12/5-12/17  - thoracentesis on 12/12/22 with associated malignant pleural effusion  - CXR with TAVON mass, "moderate left pleural effusion with associated passive atelectasis"    Plan  - Pulmonology performed L sided therapeutic thoracentesis on 1/6 (900 mL removed)       - exudative by Light's criteria with fluid LDH 1389, likely 2/2 NSCLC       - Although pleural fluid LDH > 1000 and pleural glucose < 60, clinical picture is inconsistent with infection       - If patient develops any signs or symptoms of infection, will call back Pulmonology for further drainage   - Now with improvement in SOB s/p thoracentesis, on 2L O2 via NC with SpO2 > 96 today  - Pt with recurrent malignant pleural effusion       - Pulmonology recommending inpatient pleurx catheter placement on 1/11       - Preop labs ordered today AM, COVID neg from 1/10, NPO at midnight on 1/11, lovenox held since 1/10       - Dental evaluation shows no loose teeth       - Per Pulmonology, pleurx planned for today afternoon. Patient will need to be monitored after tube placement for signs of bleeding, worsening oxygenation, infection. CM is following to set up home care for pleurx/education for family members.

## 2023-01-11 NOTE — CHART NOTE - NSCHARTNOTEFT_GEN_A_CORE
: Marlene Andrade    INDICATION: pleural effusion    PROCEDURE:  [ ] LIMITED ECHO  [x] LIMITED CHEST  [ ] LIMITED RETROPERITONEAL  [ ] LIMITED ABDOMINAL  [ ] LIMITED DVT  [ ] NEEDLE GUIDANCE VASCULAR  [ ] NEEDLE GUIDANCE THORACENTESIS  [ ] NEEDLE GUIDANCE PARACENTESIS  [ ] NEEDLE GUIDANCE PERICARDIOCENTESIS  [ ] OTHER    FINDINGS:  large left pleural effusion, simple appearing      INTERPRETATION:  large left pleural effusion, space suitable for pleurx insertion    Images uploaded on [employer] : Marlene Andrade    INDICATION: pleural effusion    PROCEDURE:  [ ] LIMITED ECHO  [x] LIMITED CHEST  [ ] LIMITED RETROPERITONEAL  [ ] LIMITED ABDOMINAL  [ ] LIMITED DVT  [ ] NEEDLE GUIDANCE VASCULAR  [ ] NEEDLE GUIDANCE THORACENTESIS  [ ] NEEDLE GUIDANCE PARACENTESIS  [ ] NEEDLE GUIDANCE PERICARDIOCENTESIS  [ ] OTHER    FINDINGS:  large left pleural effusion, simple appearing      INTERPRETATION:  large left pleural effusion, space suitable for pleurx insertion    Images uploaded on [employer]    Attending Addendum:     I was present during the entire procedure and agree with the above findings and interpretation. TIme spent on the procedure was separate from the critical care time spent caring for the patient.     Peter Morales MD

## 2023-01-11 NOTE — CONSULT NOTE ADULT - ATTENDING COMMENTS
82M with PMH of Stage IV adneocarcinoma of the lung, who presents to the ER shortness of breath found to have large left sided pleural effusion. Pulmonary consulted for further management.    Large pleural effusion on the left. Now s/p thoracentesis. Discussed the need for pleurx catheter given that the effusion reaccumulated within a month. Effusion will likely get worse once chemo is initiated.     Appreciate oncology input.     Thank you for your consult.    Please note that the patient will not be seen over the weekend. Please call the oncall pulmonary fellow if any issues or questions arise. The patient will likely be seen again on Monday.
Patient with recurrent malignant pleural effusion, complicated, referered to the IP team for consideration for tunneled pleural catheter placement. Patient already on treatment, and symptomatic, most recent drainage last week, has already re-accumulated. Patient will need a tunneled pleural catheter placement, planned for today. Risks of the procedure including but not limited to pneumothorax and bleeding as well as long term risk of infecion. discussed, and possible interventions in case those are noted also discussed extensively. After thorough risk benefit discussion and answering all questions related to the procedure, patient is agreeable to proceed with proposed intervention. Also discussed extensively with the son.

## 2023-01-11 NOTE — CONSULT NOTE ADULT - SUBJECTIVE AND OBJECTIVE BOX
HPI:  81 yo M with PMH T2DM, HTN, HLD, dengue infection, and metastatic lung adeno (dx through thoracentesis) who presents with increasing sob. Reports that he has felt progressive sob and decrease in exercise tolerance over past week. No fever, chills, chest pain, pleuritic pain. Feels similar to when he needed a thoracentesis in the past. Uses home O2 but unsure how many lpm.   Pulmonary consulted for pleural effusion.  Of note, dx with metastatic lung adeno via thoracentesis on 12/8/22. He was d/c home with oncology f/u. He was recommened to have PET scan, liquid bx. He has not yet seen OP pulm.         PAST MEDICAL & SURGICAL HISTORY:  Type II diabetes mellitus      Hypertension      Dengue fever      Pleural effusion, left      Dyslipidemia      On home oxygen therapy      Lung cancer      Gait difficulty      History of thoracentesis          FAMILY HISTORY:  FH: type 2 diabetes (Sibling)    FH: HTN (hypertension) (Sibling)        SOCIAL HISTORY:  Smoking: [ ] Never Smoked [x] Former Smoker (__ packs x ___ years) [ ] Current Smoker  (__ packs x ___ years)  Substance Use: [ ] Never Used [ ] Used ____  EtOH Use:  Marital Status: [ ] Single [ ]  [ ]  [ ]   Sexual History:   Occupation:  Recent Travel: none  Country of Birth: [facility]  Advance Directives:    Allergies    No Known Allergies    Intolerances        HOME MEDICATIONS:  Home Medications:  amLODIPine 10 mg oral tablet: 1 tab(s) orally once a day (06 Jan 2023 01:22)  aspirin 81 mg oral tablet: 1 tab(s) orally once a day (06 Jan 2023 01:22)  atorvastatin 20 mg oral tablet: 1 tab(s) orally once a day (06 Jan 2023 01:22)  lisinopril 20 mg oral tablet: 1 tab(s) orally once a day (06 Jan 2023 01:22)      REVIEW OF SYSTEMS:  All systems negative except as documented above.    OBJECTIVE:  ICU Vital Signs Last 24 Hrs  T(C): 36.7 (06 Jan 2023 13:09), Max: 37.1 (06 Jan 2023 05:10)  T(F): 98 (06 Jan 2023 13:09), Max: 98.7 (06 Jan 2023 05:10)  HR: 95 (06 Jan 2023 13:09) (63 - 95)  BP: 154/72 (06 Jan 2023 13:09) (132/72 - 154/72)  BP(mean): --  ABP: --  ABP(mean): --  RR: 18 (06 Jan 2023 13:09) (18 - 19)  SpO2: 100% (06 Jan 2023 13:09) (96% - 100%)    O2 Parameters below as of 06 Jan 2023 13:09  Patient On (Oxygen Delivery Method): nasal cannula  O2 Flow (L/min): 3            CAPILLARY BLOOD GLUCOSE      POCT Blood Glucose.: 210 mg/dL (06 Jan 2023 11:59)      PHYSICAL EXAM:  General: NAD  HEENT: PERRL, EOMI, sclera anicteric, moist mucus membranes  Neck: supple  Cardiovascular: RRR  Respiratory: decreased R side breath sounds  Abdomen: soft  Extremities: warm and well perfused, no edema, no clubbing  Skin: no rashes  Neurological: AOx3, no focal deficits    HOSPITAL MEDICATIONS:  Standing Meds:  amLODIPine   Tablet 10 milliGRAM(s) Oral daily  aspirin enteric coated 81 milliGRAM(s) Oral daily  atorvastatin 20 milliGRAM(s) Oral at bedtime  dextrose 5%. 1000 milliLiter(s) IV Continuous <Continuous>  dextrose 5%. 1000 milliLiter(s) IV Continuous <Continuous>  dextrose 50% Injectable 25 Gram(s) IV Push once  dextrose 50% Injectable 12.5 Gram(s) IV Push once  dextrose 50% Injectable 25 Gram(s) IV Push once  glucagon  Injectable 1 milliGRAM(s) IntraMuscular once  heparin   Injectable 5000 Unit(s) SubCutaneous every 12 hours  insulin lispro (ADMELOG) corrective regimen sliding scale   SubCutaneous three times a day before meals  insulin lispro (ADMELOG) corrective regimen sliding scale   SubCutaneous at bedtime  lisinopril 20 milliGRAM(s) Oral daily      PRN Meds:  dextrose Oral Gel 15 Gram(s) Oral once PRN      LABS:                        10.9   9.74  )-----------( 144      ( 06 Jan 2023 07:22 )             33.2     Hgb Trend: 10.9<--, 12.2<--  01-06    137  |  105  |  24<H>  ----------------------------<  86  4.5   |  20<L>  |  0.90    Ca    9.5      06 Jan 2023 07:22  Phos  3.3     01-06  Mg     1.50     01-06    TPro  6.9  /  Alb  3.9  /  TBili  0.5  /  DBili  x   /  AST  13  /  ALT  5   /  AlkPhos  79  01-05    Creatinine Trend: 0.90<--, 0.98<--, 0.81<--, 0.82<--, 0.89<--, 0.93<--  PT/INR - ( 05 Jan 2023 19:20 )   PT: 12.5 sec;   INR: 1.08 ratio         PTT - ( 05 Jan 2023 19:20 )  PTT:48.9 sec      Venous Blood Gas:  01-06 @ 07:22  --/--/--/--/--  VBG Lactate: 1.4  Venous Blood Gas:  01-05 @ 16:57  7.32/39/40/20/60.4  VBG Lactate: 2.9      MICROBIOLOGY:       RADIOLOGY:  [x] Reviewed and interpreted by me  
INTERVENTIONAL PULMONOLOGY CONSULT NOTE    HPI:  83 yo M with PMH T2DM, HTN, HLD, dengue infection, and metastatic lung adeno (dx through thoracentesis) who presents with increasing sob. Reports that he has felt progressive sob and decrease in exercise tolerance over past week. No fever, chills, chest pain, pleuritic pain. Feels similar to when he needed a thoracentesis in the past. Uses home O2 but unsure how many lpm.   Of note, dx with metastatic lung adeno via thoracentesis on 12/8/22. He was d/c home with oncology f/u. He was recommended to have PET scan, liquid bx. He has not yet seen OP pulm.   Pulmonary initially saw patient on 1/6/23 and performed thoracentesis for symptomatic relief with 800 ml pleural fluid drained. Interventional pulmonology consulted for pleurx catheter placement.      PAST MEDICAL & SURGICAL HISTORY:  Type II diabetes mellitus      Hypertension      Dengue fever      Pleural effusion, left      Dyslipidemia      On home oxygen therapy      Lung cancer      Gait difficulty      History of thoracentesis          FAMILY HISTORY:  FH: type 2 diabetes (Sibling)    FH: HTN (hypertension) (Sibling)          SOCIAL HISTORY:  Smoking: [ ] Never Smoked [x] Former Smoker (__ packs x ___ years) [ ] Current Smoker  (__ packs x ___ years)  Substance Use: [ ] Never Used [ ] Used ____  EtOH Use:  Marital Status: [ ] Single [ ]  [ ]  [ ]   Sexual History:   Occupation:  Recent Travel: none  Country of Birth: [facility]  Advance Directives:    Allergies    No Known Allergies    Intolerances        HOME MEDICATIONS:  Home Medications:  amLODIPine 10 mg oral tablet: 1 tab(s) orally once a day (06 Jan 2023 01:22)  atorvastatin 20 mg oral tablet: 1 tab(s) orally once a day (06 Jan 2023 01:22)  lisinopril 20 mg oral tablet: 1 tab(s) orally once a day (06 Jan 2023 01:22)      REVIEW OF SYSTEMS:  All systems negative except as documented above.    OBJECTIVE:  ICU Vital Signs Last 24 Hrs  T(C): 36.3 (11 Jan 2023 05:15), Max: 36.8 (10 Lars 2023 12:57)  T(F): 97.3 (11 Jan 2023 05:15), Max: 98.2 (10 Lars 2023 12:57)  HR: 85 (11 Jan 2023 05:15) (84 - 106)  BP: 156/72 (11 Jan 2023 05:15) (143/63 - 169/70)  BP(mean): --  ABP: --  ABP(mean): --  RR: 17 (11 Jan 2023 05:15) (17 - 18)  SpO2: 97% (11 Jan 2023 05:15) (94% - 97%)    O2 Parameters below as of 11 Jan 2023 05:15  Patient On (Oxygen Delivery Method): nasal cannula  O2 Flow (L/min): 2            CAPILLARY BLOOD GLUCOSE      POCT Blood Glucose.: 235 mg/dL (11 Jan 2023 05:57)      PHYSICAL EXAM:  General: NAD  HEENT: PERRL, EOMI, sclera anicteric, moist mucus membranes  Neck: supple  Cardiovascular: RRR  Respiratory: decreased left side breath sounds  Abdomen: soft  Extremities: warm and well perfused, no edema, no clubbing  Skin: no rashes  Neurological: AOx3, no focal deficits    HOSPITAL MEDICATIONS:  Standing Meds:  amLODIPine   Tablet 10 milliGRAM(s) Oral daily  atorvastatin 20 milliGRAM(s) Oral at bedtime  dextrose 5%. 1000 milliLiter(s) IV Continuous <Continuous>  dextrose 5%. 1000 milliLiter(s) IV Continuous <Continuous>  dextrose 50% Injectable 25 Gram(s) IV Push once  dextrose 50% Injectable 12.5 Gram(s) IV Push once  dextrose 50% Injectable 25 Gram(s) IV Push once  glucagon  Injectable 1 milliGRAM(s) IntraMuscular once  insulin lispro (ADMELOG) corrective regimen sliding scale   SubCutaneous every 6 hours  lisinopril 30 milliGRAM(s) Oral daily  melatonin 3 milliGRAM(s) Oral at bedtime  pantoprazole    Tablet 40 milliGRAM(s) Oral before breakfast      PRN Meds:  dextrose Oral Gel 15 Gram(s) Oral once PRN      LABS:                        13.3   9.22  )-----------( 181      ( 10 Lars 2023 06:50 )             39.3     Hgb Trend: 13.3<--, 12.7<--, 12.3<--, 10.9<--, 12.2<--  01-10    133<L>  |  101  |  25<H>  ----------------------------<  178<H>  4.6   |  20<L>  |  0.88    Ca    9.9      10 Lars 2023 06:50  Phos  3.1     01-10  Mg     1.80     01-10    TPro  7.1  /  Alb  3.9  /  TBili  0.5  /  DBili  x   /  AST  32  /  ALT  20  /  AlkPhos  89  01-10    Creatinine Trend: 0.88<--, 0.88<--, 0.87<--, 0.90<--, 0.98<--, 0.81<--  PT/INR - ( 10 Lars 2023 06:50 )   PT: 12.2 sec;   INR: 1.05 ratio         PTT - ( 10 Lars 2023 06:50 )  PTT:55.0 sec          MICROBIOLOGY:       RADIOLOGY:  [x] Reviewed and interpreted by me  
Patient is a 82y old  Male who presents with a chief complaint of Pleural effusion (06 Jan 2023 13:02)      HPI:  82 year old male, with past history significant for Pleural effusion, Lung cancer Home oxygen use (2 liters), HTN, DM-2, HLD, and Dengue fever, presented to the ED secondary to worsening difficulty in breathing.  Seen and evaluated at bedside; tachypneic at times to low 30's, but no signs of acute distress.  Via , patient endorses breathing difficulties, including worsening shortness of breath, decreased exercise endurance, and occasional palpitations for the past ~ one week.  Uses 2 pillows nightly.  States feeling as if fluid has accumulated in the lungs.  No chest pain.  No edema.  No coughing.  Per report from family, patient's home oxygen was increased to 3 liters because of increased work of breathing, pronounced over the recent hours.      Vital signs upon ED presentation as follows: BP = 161/82, HR = 91, RR = 30, T = 36.7 C (98 F), O2 Sat = 94% on RA (to 100% on 3L NC).  Diagnosed with Pleural Effusion in the ED. (05 Jan 2023 21:10)       Oncologic History:      ROS: as above     PAST MEDICAL & SURGICAL HISTORY:  Type II diabetes mellitus      Hypertension      Dengue fever      Pleural effusion, left      Dyslipidemia      On home oxygen therapy      Lung cancer      Gait difficulty      History of thoracentesis          SOCIAL HISTORY:    FAMILY HISTORY:  FH: type 2 diabetes (Sibling)    FH: HTN (hypertension) (Sibling)        MEDICATIONS  (STANDING):  amLODIPine   Tablet 10 milliGRAM(s) Oral daily  aspirin enteric coated 81 milliGRAM(s) Oral daily  atorvastatin 20 milliGRAM(s) Oral at bedtime  dextrose 5%. 1000 milliLiter(s) (50 mL/Hr) IV Continuous <Continuous>  dextrose 5%. 1000 milliLiter(s) (100 mL/Hr) IV Continuous <Continuous>  dextrose 50% Injectable 25 Gram(s) IV Push once  dextrose 50% Injectable 12.5 Gram(s) IV Push once  dextrose 50% Injectable 25 Gram(s) IV Push once  glucagon  Injectable 1 milliGRAM(s) IntraMuscular once  heparin   Injectable 5000 Unit(s) SubCutaneous every 12 hours  insulin lispro (ADMELOG) corrective regimen sliding scale   SubCutaneous three times a day before meals  insulin lispro (ADMELOG) corrective regimen sliding scale   SubCutaneous at bedtime  lisinopril 20 milliGRAM(s) Oral daily    MEDICATIONS  (PRN):  dextrose Oral Gel 15 Gram(s) Oral once PRN Blood Glucose LESS THAN 70 milliGRAM(s)/deciliter      Allergies    No Known Allergies    Intolerances        Vital Signs Last 24 Hrs  T(C): 36.7 (06 Jan 2023 13:09), Max: 37.1 (06 Jan 2023 05:10)  T(F): 98 (06 Jan 2023 13:09), Max: 98.7 (06 Jan 2023 05:10)  HR: 95 (06 Jan 2023 13:09) (63 - 95)  BP: 154/72 (06 Jan 2023 13:09) (132/72 - 154/72)  BP(mean): --  RR: 18 (06 Jan 2023 13:09) (18 - 19)  SpO2: 100% (06 Jan 2023 13:09) (96% - 100%)    Parameters below as of 06 Jan 2023 13:09  Patient On (Oxygen Delivery Method): nasal cannula  O2 Flow (L/min): 3      PHYSICAL EXAM  General: adult in NAD  HEENT: clear oropharynx, anicteric sclera, pink conjunctiva  Neck: supple  CV: normal S1/S2 with no murmur rubs or gallops  Lungs: positive air movement b/l ant lungs, clear to auscultation, no wheezes, no rales  Abdomen: soft non-tender non-distended, no hepatosplenomegaly  Ext: no clubbing cyanosis or edema  Skin: no rashes and no petechiae  Neuro: alert and oriented X 3, none focal    LABS:                          10.9   9.74  )-----------( 144      ( 06 Jan 2023 07:22 )             33.2         Mean Cell Volume : 85.1 fL  Mean Cell Hemoglobin : 27.9 pg  Mean Cell Hemoglobin Concentration : 32.8 gm/dL  Auto Neutrophil # : 7.35 K/uL  Auto Lymphocyte # : 1.05 K/uL  Auto Monocyte # : 0.85 K/uL  Auto Eosinophil # : 0.42 K/uL  Auto Basophil # : 0.03 K/uL  Auto Neutrophil % : 75.5 %  Auto Lymphocyte % : 10.8 %  Auto Monocyte % : 8.7 %  Auto Eosinophil % : 4.3 %  Auto Basophil % : 0.3 %      Serial CBC's  01-06 @ 07:22  Hct-33.2 / Hgb-10.9 / Plat-144 / RBC-3.90 / WBC-9.74  Serial CBC's  01-05 @ 16:57  Hct-37.5 / Hgb-12.2 / Plat-160 / RBC-4.31 / WBC-10.69      01-06    137  |  105  |  24<H>  ----------------------------<  86  4.5   |  20<L>  |  0.90    Ca    9.5      06 Jan 2023 07:22  Phos  3.3     01-06  Mg     1.50     01-06    TPro  6.9  /  Alb  3.9  /  TBili  0.5  /  DBili  x   /  AST  13  /  ALT  5   /  AlkPhos  79  01-05      PT/INR - ( 05 Jan 2023 19:20 )   PT: 12.5 sec;   INR: 1.08 ratio         PTT - ( 05 Jan 2023 19:20 )  PTT:48.9 sec                RADIOLOGY & ADDITIONAL STUDIES:

## 2023-01-11 NOTE — PROGRESS NOTE ADULT - PROBLEM SELECTOR PROBLEM 4
RN assumes care of patient, received bedside report from Michelle WORTHY    Patient appears very drowsy from NOC administration of Haldol, will continue to monitor   HTN (hypertension)

## 2023-01-11 NOTE — BRIEF OPERATIVE NOTE - OPERATION/FINDINGS
Left pleural effusion localized with ultrasound. Needle entry site and catheter tract anesthetized with lidocaine. Needle inserted into pleural space, catheter passed over needle. Needle removed. Wire inserted through catheter into pleural space. Incision made at wire entry site and 4 cm anterior. Trochar with catheter inserted through anterior incision, advanced to wire entry site with blunt dissection. Dilator passed over wire. Wire removed. Catheter inserted through dilator. Dilator removed. Pleural entry site closed with dermabond, catheter exit site closed with suture. 600 cc pleural fluid drained, serosanguinous. Left sided tunneled pleural catheter placed under ultrasound guidance. 600cc of blood fluid drained.     #[id number]

## 2023-01-12 NOTE — DIETITIAN INITIAL EVALUATION ADULT - OTHER INFO
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 Admitted for evaluation and management of shortness of breath secondary to recurrence of his malignant effusion, s/p thoracentesis (1/6) and Pleurx (1/11) per chart.    Patient reports good appetite in house, noted w/ intakes % per RN flow sheet. Consumes Glucerna supplement daily. No GI distress or chewing/swallowing difficulties reported. Has no food allergies. Reports UBW was 135 lbs. x 1 month and has lost weight. ABW is 123.6 lbs. (1/11) per chart indicating a -8.5% weight loss x 1 month in setting of catabolic illness. No edema or pressure injuries noted per RN flow sheet.    Educated patient on ways to make meals/snacks more calorie dense and trying small frequent meals for weight stability.

## 2023-01-12 NOTE — PROGRESS NOTE ADULT - SUBJECTIVE AND OBJECTIVE BOX
Patient is a 82y old  Male who presents with a chief complaint of Pleural effusion (12 Jan 2023 10:52)      SUBJECTIVE / OVERNIGHT EVENTS:    Patient seen and examined at bedside. No acute events overnight.    T(F): 97.1 (01-12 @ 04:51), Max: 98 (01-11 @ 14:17)  HR: 86 (01-12 @ 04:51) (83 - 97)  BP: 148/78 (01-12 @ 04:51) (87/70 - 166/90)  RR: 10 (01-12 @ 04:51) (10 - 28)  SpO2: 100% (01-12 @ 04:51) (95% - 100%)    I&O's Summary    11 Jan 2023 07:01  -  12 Jan 2023 07:00  --------------------------------------------------------  IN: 0 mL / OUT: 200 mL / NET: -200 mL        MEDICATIONS  (STANDING):  amLODIPine   Tablet 10 milliGRAM(s) Oral daily  atorvastatin 20 milliGRAM(s) Oral at bedtime  dextrose 5%. 1000 milliLiter(s) (50 mL/Hr) IV Continuous <Continuous>  dextrose 5%. 1000 milliLiter(s) (100 mL/Hr) IV Continuous <Continuous>  dextrose 50% Injectable 25 Gram(s) IV Push once  dextrose 50% Injectable 12.5 Gram(s) IV Push once  dextrose 50% Injectable 25 Gram(s) IV Push once  glucagon  Injectable 1 milliGRAM(s) IntraMuscular once  insulin lispro (ADMELOG) corrective regimen sliding scale   SubCutaneous three times a day before meals  insulin lispro (ADMELOG) corrective regimen sliding scale   SubCutaneous at bedtime  lisinopril 40 milliGRAM(s) Oral daily  melatonin 3 milliGRAM(s) Oral at bedtime  pantoprazole    Tablet 40 milliGRAM(s) Oral before breakfast    MEDICATIONS  (PRN):  dextrose Oral Gel 15 Gram(s) Oral once PRN Blood Glucose LESS THAN 70 milliGRAM(s)/deciliter      Constitutional: denies fevers, chills, night sweats, weight loss  HEENT: denies visual changes, cough  Cardiovascular: denies palpitations, chest pain, edema  Respiratory: denies SOB, wheezing  Gastrointestinal: denies N/V/D, abdominal pain, hematochezia, melena  : denies dysuria, urinary urgency, increased frequency  MSK: denies muscle weakness, joint pain  Skin: denies new rashes or masses  Heme: denies bleeding, bruising  Neuro: denies headache, weakness    PHYSICAL EXAM:   GEN: Age appropriate, resting comfortably in bed, no acute distress, non toxic appearing, speaking in complete sentences.   HEENT: Conjunctiva and sclera normal  PULM: Lungs decreased breath sounds left lower lung fields. No TTP or erythema around Pleurx insertion site. Bandage CDI.   CV: RRR, S1S2, no MRG  MSK: no stiffness or joint effusions  Abdominal: Soft, nontender to palpation, non-distended, +BS  Extremities: No edema or cyanosis  NEURO: AAOx3  Psych: normal affect, normal behavior  Skin: No rashes, lesions    LABS:  Labs personally reviewed.                        12.9   8.64  )-----------( 209      ( 12 Jan 2023 05:20 )             39.0     Hgb Trend: 12.9<--, 12.2<--, 13.3<--, 12.7<--, 12.3<--  01-12    138  |  105  |  23  ----------------------------<  194<H>  5.0   |  23  |  0.99    Ca    10.1      12 Jan 2023 05:20  Phos  3.6     01-12  Mg     1.70     01-12    TPro  6.9  /  Alb  3.7  /  TBili  0.3  /  DBili  x   /  AST  24  /  ALT  22  /  AlkPhos  83  01-11    Creatinine Trend: 0.99<--, 1.05<--, 0.88<--, 0.88<--, 0.87<--, 0.90<--  PT/INR - ( 11 Jan 2023 06:55 )   PT: 11.6 sec;   INR: 1.00 ratio         PTT - ( 11 Jan 2023 06:55 )  PTT:47.6 sec        [facility]  Pulmonary and Critical Care Fellow    PGY-5 Pager: [place]-[phone]  [facility]-[phone]  [facility] Pulmonary Spectra [phone]   Night Float:

## 2023-01-12 NOTE — PROGRESS NOTE ADULT - SUBJECTIVE AND OBJECTIVE BOX
Marii Ortiz, PGY1  TEAMS or Pager [phone]     Patient is a 82y old  Male who presents with a chief complaint of Pleural effusion (10 Lars 2023 17:22)    [place]  ***    SUBJECTIVE/INTERVAL EVENTS: Overnight, patient had L sided tunneled pleurx catheter placed with 600 cc of sanguinous fluid drained. Patient seen and examined at bedside today. He is breathing comfortably on 2L O2 via NC. He endorses some discomfort at catheter site but denies excessive pain, bleeding, or worse SOB. Otherwise, denies chest pain, lightheadedness, nausea, vomiting, constipation, or diarrhea.    MEDICATIONS  (STANDING):  amLODIPine   Tablet 10 milliGRAM(s) Oral daily  atorvastatin 20 milliGRAM(s) Oral at bedtime  dextrose 5%. 1000 milliLiter(s) (50 mL/Hr) IV Continuous <Continuous>  dextrose 5%. 1000 milliLiter(s) (100 mL/Hr) IV Continuous <Continuous>  dextrose 50% Injectable 25 Gram(s) IV Push once  dextrose 50% Injectable 12.5 Gram(s) IV Push once  dextrose 50% Injectable 25 Gram(s) IV Push once  glucagon  Injectable 1 milliGRAM(s) IntraMuscular once  insulin lispro (ADMELOG) corrective regimen sliding scale   SubCutaneous every 6 hours  lisinopril 30 milliGRAM(s) Oral daily  melatonin 3 milliGRAM(s) Oral at bedtime  pantoprazole    Tablet 40 milliGRAM(s) Oral before breakfast    MEDICATIONS  (PRN):  dextrose Oral Gel 15 Gram(s) Oral once PRN Blood Glucose LESS THAN 70 milliGRAM(s)/deciliter      VITAL SIGNS:  T(F): 97.3 (01-11-23 @ 05:15), Max: 98.2 (01-10-23 @ 12:57)  HR: 85 (01-11-23 @ 05:15) (84 - 106)  BP: 156/72 (01-11-23 @ 05:15) (143/63 - 169/70)  RR: 17 (01-11-23 @ 05:15) (17 - 18)  SpO2: 97% (01-11-23 @ 05:15) (94% - 97%)    I&O's Summary    Daily     Daily     PHYSICAL EXAM:  Gen: Alert, NAD  HEENT: NCAT, conjunctiva clear, sclera anicteric, MMM  Neck: Supple, no JVD  CV: RRR, S1S2, no m/r/g, distant heart sounds  Resp: CTAB, normal respiratory effort, barrel chest  Abd: Soft, nontender, nondistended, normal bowel sounds  Ext: no edema, no clubbing or cyanosis  Neuro: AOx3, CN2-12 grossly intact, KELLEY  SKIN: warm, perfused    LABS:                        13.3   9.22  )-----------( 181      ( 10 Lars 2023 06:50 )             39.3     Hgb Trend: 13.3<--, 12.7<--, 12.3<--, 10.9<--, 12.2<--  01-10    133<L>  |  101  |  25<H>  ----------------------------<  178<H>  4.6   |  20<L>  |  0.88    Ca    9.9      10 Lars 2023 06:50  Phos  3.1     01-10  Mg     1.80     01-10    TPro  7.1  /  Alb  3.9  /  TBili  0.5  /  DBili  x   /  AST  32  /  ALT  20  /  AlkPhos  89  01-10    Creatinine Trend: 0.88<--, 0.88<--, 0.87<--, 0.90<--, 0.98<--, 0.81<--  LIVER FUNCTIONS - ( 10 Lars 2023 06:50 )  Alb: 3.9 g/dL / Pro: 7.1 g/dL / ALK PHOS: 89 U/L / ALT: 20 U/L / AST: 32 U/L / GGT: x           PT/INR - ( 10 Lars 2023 06:50 )   PT: 12.2 sec;   INR: 1.05 ratio      PTT - ( 10 Lars 2023 06:50 )  PTT:55.0 sec      CAPILLARY BLOOD GLUCOSE  POCT Blood Glucose.: 342 mg/dL (10 Lars 2023 21:16)  POCT Blood Glucose.: 356 mg/dL (10 Lars 2023 17:51)  POCT Blood Glucose.: 261 mg/dL (10 Lars 2023 12:11)  POCT Blood Glucose.: 155 mg/dL (10 Lars 2023 08:29)      RADIOLOGY & ADDITIONAL TESTS: Reviewed    Imaging Personally Reviewed:     Consultant(s) Notes Reviewed:  Pulmonology, Interventional Pulmonology    Care Discussed with Consultants/Other Providers:   Marii Ortiz, PGY1  TEAMS or Pager [phone]     Patient is a 82y old  Male who presents with a chief complaint of Pleural effusion (10 Lars 2023 17:22)    [place]  Krysta #[id number]    SUBJECTIVE/INTERVAL EVENTS: Overnight, patient had L sided tunneled pleurx catheter placed with 600 cc of sanguinous fluid drained. Patient seen and examined at bedside today. He is breathing comfortably on 2L O2 via NC. He endorses some discomfort at catheter site but denies excessive pain, bleeding, or worse SOB. Otherwise, denies chest pain, lightheadedness, nausea, vomiting, constipation, or diarrhea.    MEDICATIONS  (STANDING):  amLODIPine   Tablet 10 milliGRAM(s) Oral daily  atorvastatin 20 milliGRAM(s) Oral at bedtime  dextrose 5%. 1000 milliLiter(s) (50 mL/Hr) IV Continuous <Continuous>  dextrose 5%. 1000 milliLiter(s) (100 mL/Hr) IV Continuous <Continuous>  dextrose 50% Injectable 25 Gram(s) IV Push once  dextrose 50% Injectable 12.5 Gram(s) IV Push once  dextrose 50% Injectable 25 Gram(s) IV Push once  glucagon  Injectable 1 milliGRAM(s) IntraMuscular once  insulin lispro (ADMELOG) corrective regimen sliding scale   SubCutaneous every 6 hours  lisinopril 30 milliGRAM(s) Oral daily  melatonin 3 milliGRAM(s) Oral at bedtime  pantoprazole    Tablet 40 milliGRAM(s) Oral before breakfast    MEDICATIONS  (PRN):  dextrose Oral Gel 15 Gram(s) Oral once PRN Blood Glucose LESS THAN 70 milliGRAM(s)/deciliter      VITAL SIGNS:  T(F): 97.3 (01-11-23 @ 05:15), Max: 98.2 (01-10-23 @ 12:57)  HR: 85 (01-11-23 @ 05:15) (84 - 106)  BP: 156/72 (01-11-23 @ 05:15) (143/63 - 169/70)  RR: 17 (01-11-23 @ 05:15) (17 - 18)  SpO2: 97% (01-11-23 @ 05:15) (94% - 97%)    I&O's Summary    Daily     Daily     PHYSICAL EXAM:  Gen: Alert, NAD  HEENT: NCAT, conjunctiva clear, sclera anicteric, MMM  Neck: Supple, no JVD  CV: RRR, S1S2, no m/r/g, distant heart sounds  Resp: CTAB, normal respiratory effort, barrel chest  Abd: Soft, nontender, nondistended, normal bowel sounds  Ext: no edema, no clubbing or cyanosis  Neuro: AOx3, CN2-12 grossly intact, KELLEY  SKIN: warm, perfused    LABS:                        13.3   9.22  )-----------( 181      ( 10 Lars 2023 06:50 )             39.3     Hgb Trend: 13.3<--, 12.7<--, 12.3<--, 10.9<--, 12.2<--  01-10    133<L>  |  101  |  25<H>  ----------------------------<  178<H>  4.6   |  20<L>  |  0.88    Ca    9.9      10 Lars 2023 06:50  Phos  3.1     01-10  Mg     1.80     01-10    TPro  7.1  /  Alb  3.9  /  TBili  0.5  /  DBili  x   /  AST  32  /  ALT  20  /  AlkPhos  89  01-10    Creatinine Trend: 0.88<--, 0.88<--, 0.87<--, 0.90<--, 0.98<--, 0.81<--  LIVER FUNCTIONS - ( 10 Lars 2023 06:50 )  Alb: 3.9 g/dL / Pro: 7.1 g/dL / ALK PHOS: 89 U/L / ALT: 20 U/L / AST: 32 U/L / GGT: x           PT/INR - ( 10 Lars 2023 06:50 )   PT: 12.2 sec;   INR: 1.05 ratio      PTT - ( 10 Lars 2023 06:50 )  PTT:55.0 sec      CAPILLARY BLOOD GLUCOSE  POCT Blood Glucose.: 342 mg/dL (10 Lars 2023 21:16)  POCT Blood Glucose.: 356 mg/dL (10 Lars 2023 17:51)  POCT Blood Glucose.: 261 mg/dL (10 Lars 2023 12:11)  POCT Blood Glucose.: 155 mg/dL (10 Lars 2023 08:29)      RADIOLOGY & ADDITIONAL TESTS: Reviewed    Imaging Personally Reviewed:     Consultant(s) Notes Reviewed:  Pulmonology, Interventional Pulmonology    Care Discussed with Consultants/Other Providers: Pulmonology, Interventional Pulmonology

## 2023-01-12 NOTE — PROGRESS NOTE ADULT - ASSESSMENT
82m with recently diagnosed NSCLC with malignant pleural effusion, no brain mets, PET/CT was done 1/3 but report is pending,  presenting with SOB in the setting of Pleff reaccumulation.     NGS with KRAS G12C mutation  PDL1 30%    Discussed treatment options including chemo/IO, IO alone, KRAS inhibitors.   Pt's son Prabhjot to speak to siblings about options  Pt and family are interested in starting immunotherapy as outpatient.   Will plan for this the week after discharge.      Now s/p Drainage of effusion, SOB improved.  Planned for pleurex placement.     1/11: s/p Left sided tunneled pleural catheter placed under ultrasound guidance. 600cc of blood fluid drained.  -Appreciate pulm input  -PET/CT reviewed. Would appreciate pulm input on the right sided pleural thickening.   -Will plan for immunotherapy as outpatient, as per patient son have appt with onc clinic on 1/19 with Dr Segovia to discuss treatment further  -Pt with metastatic bone disease, can consider bone modifying agents as outpatient but will need dental clearance especially given the dental disease shown on PET/CT.   -Albumin and Ca normal, will monitor  -No plan for inpatient chemo or immunotherapy  -Supportive care, pain control, Nutrition, PT, DVT ppx  -Outpatient oncology f/u      Case discussed with Dr. Majo ROSAS  Oncology Physician Assistant  Yuridia CADE/MALINDA [facility]  Pager [phone] also available on Teams    If before 8am/after 5pm or on weekends please page On-call Oncology Fellow

## 2023-01-12 NOTE — DIETITIAN INITIAL EVALUATION ADULT - NS FNS DIET ORDER
Diet, Consistent Carbohydrate/No Snacks:   Halal  Supplement Feeding Modality:  Oral  Glucerna Shake Cans or Servings Per Day:  1       Frequency:  Two Times a day (01-11-23 @ 18:02)

## 2023-01-12 NOTE — PROGRESS NOTE ADULT - PROBLEM SELECTOR PLAN 1
- initially presented with 1 week of worsening JERNIGAN, orthopnea  - diagnosed with stage IV lung adenocarcinoma during recent admission 12/5-12/17  - thoracentesis on 12/12/22 with associated malignant pleural effusion  - CXR with TAVON mass, "moderate left pleural effusion with associated passive atelectasis"    Plan  - Pulmonology performed L sided therapeutic thoracentesis on 1/6 (900 mL removed)       - exudative by Light's criteria with fluid LDH 1389, likely 2/2 NSCLC       - Although pleural fluid LDH > 1000 and pleural glucose < 60, clinical picture is inconsistent with infection  - Now with improvement in SOB s/p thoracentesis, on 2L O2 via NC with SpO2 > 96 today  - Pt with recurrent malignant pleural effusion       - Pulmonology recommending inpatient pleurx catheter placement on 1/11  - No signs of bleeding, worsening oxygenation, infection  -  is following to set up home care for pleurx/education for family members - initially presented with 1 week of worsening JERNIGAN, orthopnea  - diagnosed with stage IV lung adenocarcinoma during recent admission 12/5-12/17  - thoracentesis on 12/12/22 with associated malignant pleural effusion  - CXR with TAVON mass, "moderate left pleural effusion with associated passive atelectasis"    Plan  - Pulmonology performed L sided therapeutic thoracentesis on 1/6 (900 mL removed)       - exudative by Light's criteria with fluid LDH 1389, likely 2/2 NSCLC       - Although pleural fluid LDH > 1000 and pleural glucose < 60, clinical picture is inconsistent with infection  - Now with improvement in SOB s/p thoracentesis, on 2L O2 via NC with SpO2 > 97%  - Pt with recurrent malignant pleural effusion       - s/p inpatient pleurx catheter placement by Interv Pulm on 1/11 with 600 cc removed  - No signs of bleeding, worsening oxygenation, infection from catheter site today  - AN is following to set up home care for pleurx/education for family members

## 2023-01-12 NOTE — PROGRESS NOTE ADULT - PROBLEM SELECTOR PLAN 4
- Sys BP elevated 140s-160s recently    Plan  - c/w amlodipine 10 mg daily  - c/w lisinopril 40 mg daily (increased on 1/11)  - discontinue ASA 81 mg, pt was taking for primary ppx and denies hx of CAD/MI/CVA

## 2023-01-12 NOTE — DIETITIAN INITIAL EVALUATION ADULT - ORAL INTAKE PTA/DIET HISTORY
Patient seen for assessment. Information obtained w/ help from  #[id number]. Patient reports good appetite PTA. Usually consumes 3 meals/day. Per diet recall, patient w/ low intakes of protein with some meals/snacks not being calorie dense.

## 2023-01-12 NOTE — PROGRESS NOTE ADULT - SUBJECTIVE AND OBJECTIVE BOX
INTERVAL HPI/OVERNIGHT EVENTS:  Patient seen at bedside.  Patient resting comfortably  No acute complaints    Patient son Prabhjot was available via telephone per patient request   and provided Sinhala translation    VITAL SIGNS:  T(F): 97.5 (01-12-23 @ 12:34)  HR: 98 (01-12-23 @ 12:34)  BP: 160/60 (01-12-23 @ 12:34)  RR: 18 (01-12-23 @ 12:34)  SpO2: 100% (01-12-23 @ 12:34)  Wt(kg): --    PHYSICAL EXAM:  Gen: Alert, NAD  HEENT: NCAT, conjunctiva clear, sclera anicteric, MMM  Neck: Supple, no JVD  CV: RRR, S1S2, no m/r/g, distant heart sounds  Resp: CTAB, normal respiratory effort, barrel chest  Abd: Soft, nontender, nondistended, normal bowel sounds  Ext: no edema, no clubbing or cyanosis  Neuro: AOx3, CN2-12 grossly intact, KELLEY  SKIN: warm, perfused      MEDICATIONS  (STANDING):  amLODIPine   Tablet 10 milliGRAM(s) Oral daily  atorvastatin 20 milliGRAM(s) Oral at bedtime  dextrose 5%. 1000 milliLiter(s) (50 mL/Hr) IV Continuous <Continuous>  dextrose 5%. 1000 milliLiter(s) (100 mL/Hr) IV Continuous <Continuous>  dextrose 50% Injectable 25 Gram(s) IV Push once  dextrose 50% Injectable 12.5 Gram(s) IV Push once  dextrose 50% Injectable 25 Gram(s) IV Push once  enoxaparin Injectable 40 milliGRAM(s) SubCutaneous every 24 hours  glucagon  Injectable 1 milliGRAM(s) IntraMuscular once  insulin lispro (ADMELOG) corrective regimen sliding scale   SubCutaneous three times a day before meals  insulin lispro (ADMELOG) corrective regimen sliding scale   SubCutaneous at bedtime  lisinopril 40 milliGRAM(s) Oral daily  melatonin 3 milliGRAM(s) Oral at bedtime  pantoprazole    Tablet 40 milliGRAM(s) Oral before breakfast    MEDICATIONS  (PRN):  dextrose Oral Gel 15 Gram(s) Oral once PRN Blood Glucose LESS THAN 70 milliGRAM(s)/deciliter      Allergies    No Known Allergies    Intolerances        LABS:                        12.9   8.64  )-----------( 209      ( 12 Jan 2023 05:20 )             39.0     01-12    138  |  105  |  23  ----------------------------<  194<H>  5.0   |  23  |  0.99    Ca    10.1      12 Jan 2023 05:20  Phos  3.6     01-12  Mg     1.70     01-12    TPro  6.9  /  Alb  3.7  /  TBili  0.3  /  DBili  x   /  AST  24  /  ALT  22  /  AlkPhos  83  01-11    PT/INR - ( 11 Jan 2023 06:55 )   PT: 11.6 sec;   INR: 1.00 ratio         PTT - ( 11 Jan 2023 06:55 )  PTT:47.6 sec      RADIOLOGY & ADDITIONAL TESTS:  Studies reviewed.

## 2023-01-12 NOTE — PROGRESS NOTE ADULT - ATTENDING COMMENTS
Patient seen and examined, care plan discussed with house staff as above.    82yoM presenting with SOB, found to have AHRF 2/2 re-accumulation of exudative pleural effusion due to his newly diagnosed (12/2022) metastatic adenocarcinoma of the lung (not yet started on treatment). S/p thoracentesis on 1/6 w 900 ml of exudative pleural fluid drained with hemorrhagic component. Pulm placed pleurex on 1/11, CXR improved on 1/11 with drain appropriately placed, f/u with them re guidance on frequency and amount to drain per week, will need f/u in Pulm clinic. Plan for d/c home 1/12 with close Onc follow up. CM reports issues with insurance covering pleurex supplies and home nurse.     Will clarify why patient is on baby asa. No documentation of CAD in chart, clarified that there's no hx of CAD, d/c asa. Intermittent hyperglycemia noted this admission, f/u repeat A1c [ ]. Prior documentation of A1c of 5.7. Taking metformin 1 g BID at home.

## 2023-01-12 NOTE — PROGRESS NOTE ADULT - ASSESSMENT
83 yo M with PMH T2DM, HTN, HLD, dengue infection, and metastatic lung adeno (dx through thoracentesis) who presents with increasing sob. Found to have recurrent malignant pleural effusion, s/p thoracentesis with 800 ml pleural fluid drained on 23. Interventional pulmonology consulted for pleurx catheter placement.    S/p PleurX catheter placement   Please drain pleurx inpatient every other day up to 1L, stop if symptomatic (cough, chest pain). These should be the discharge instructions for the Pleurx as well.     The patient will need close interventional pulmonary follow up immediately after discharge. The interventional pulmonary attending Dr Peter Morales sees patients on Monday's in the clinic.   Please email: [email or web address] to setup an appointment prior to discharge and request an appointment for the patient with Dr. Peter Morales. Include the patient's name, , MRN and contact information in the email.  Please include the details of the appointment in the patients discharge summary.    Pulmonary/Sleep Clinic  [address] [place] [address]  [phone]

## 2023-01-12 NOTE — PROGRESS NOTE ADULT - PROBLEM SELECTOR PLAN 5
- reportedly w/ FS 72 since admission and given juice, sandwich, but w/ repeat FS = 77  - Hb A1c 5.7, prediabetic  - patient with AM glu 235 today, daytime glu 261-356 on 1/10, required 11 units ISS  - hyperglycemia likely due to patient eating outside food and frequent snacks    Plan  - FS glu qAC and qHS  - low dose ISS   - consistent carb diet  - encourage patient to limit snacks - reportedly w/ FS 72 since admission and given juice, sandwich, but w/ repeat FS = 77  - Hb A1c 5.7, prediabetic, repeat A1c 6.6 on 1/12  - patient with AM glu 207 today, daytime 133-232 on 1/10, required 4 units ISS yesterday  - hyperglycemia likely due to patient eating outside food and frequent snacks    Plan  - FS glu qAC and qHS  - low dose ISS   - consistent carb diet  - encourage patient to limit snacks

## 2023-01-12 NOTE — PROGRESS NOTE ADULT - ATTENDING COMMENTS
Stage IV non-small cell lung adenocarcinoma with recurrent malignant left pleural effusion s/p L tunneled indwelling pleural catheter placement on 1/11. Recommend to drain up to 1 liter every other day. Close outpatient follow-up with Dr. Morales

## 2023-01-12 NOTE — PROGRESS NOTE ADULT - PROBLEM SELECTOR PLAN 3
- with 25 pack year history of cigarette smoking; quit ~ 30 years ago  - followed by [facility]    Plan  - Oncology consulted, no plan for chemotherapy while inpatient  - patient has not initiated treatment for lung cancer yet due to recent diagnosis  - son reports appointment scheduled on 1/19 to begin immunotherapy

## 2023-01-12 NOTE — PROGRESS NOTE ADULT - PROBLEM SELECTOR PLAN 6
- DVT ppx: lovenox 40 SQ daily (held today for procedure today)  - Diet: consistent carb  - PT evaluation: home with no needs  - Dispo: pending course  - CM following, pt will need home oxygen and education on pleural catheter    Updated son Rash via phone ([phone])

## 2023-01-12 NOTE — DIETITIAN INITIAL EVALUATION ADULT - PERTINENT LABORATORY DATA
01-12    138  |  105  |  23  ----------------------------<  194<H>  5.0   |  23  |  0.99    Ca    10.1      12 Jan 2023 05:20  Phos  3.6     01-12  Mg     1.70     01-12    TPro  6.9  /  Alb  3.7  /  TBili  0.3  /  DBili  x   /  AST  24  /  ALT  22  /  AlkPhos  83  01-11  POCT Blood Glucose.: 302 mg/dL (01-12-23 @ 12:12)  A1C with Estimated Average Glucose Result: 6.6 % (01-12-23 @ 05:20)  A1C with Estimated Average Glucose Result: 5.7 % (12-08-22 @ 06:34)

## 2023-01-12 NOTE — DIETITIAN INITIAL EVALUATION ADULT - PERTINENT MEDS FT
MEDICATIONS  (STANDING):  amLODIPine   Tablet 10 milliGRAM(s) Oral daily  atorvastatin 20 milliGRAM(s) Oral at bedtime  dextrose 5%. 1000 milliLiter(s) (50 mL/Hr) IV Continuous <Continuous>  dextrose 5%. 1000 milliLiter(s) (100 mL/Hr) IV Continuous <Continuous>  dextrose 50% Injectable 25 Gram(s) IV Push once  dextrose 50% Injectable 12.5 Gram(s) IV Push once  dextrose 50% Injectable 25 Gram(s) IV Push once  enoxaparin Injectable 40 milliGRAM(s) SubCutaneous every 24 hours  glucagon  Injectable 1 milliGRAM(s) IntraMuscular once  insulin lispro (ADMELOG) corrective regimen sliding scale   SubCutaneous three times a day before meals  insulin lispro (ADMELOG) corrective regimen sliding scale   SubCutaneous at bedtime  lisinopril 40 milliGRAM(s) Oral daily  melatonin 3 milliGRAM(s) Oral at bedtime  pantoprazole    Tablet 40 milliGRAM(s) Oral before breakfast    MEDICATIONS  (PRN):  dextrose Oral Gel 15 Gram(s) Oral once PRN Blood Glucose LESS THAN 70 milliGRAM(s)/deciliter

## 2023-01-13 NOTE — PROGRESS NOTE ADULT - SUBJECTIVE AND OBJECTIVE BOX
Marii Ortiz, PGY1  TEAMS or Pager [phone]     Patient is a 82y old  Male who presents with a chief complaint of Pleural effusion (10 Lars 2023 17:22)    Hebrew  ***    SUBJECTIVE/INTERVAL EVENTS: NAOE. Patient seen and examined at bedside today. He is breathing comfortably on 2L O2 via NC. He endorses some discomfort at catheter site but denies excessive pain, bleeding, or worse SOB. Otherwise, denies chest pain, lightheadedness, nausea, vomiting, constipation, or diarrhea.    MEDICATIONS  (STANDING):  amLODIPine   Tablet 10 milliGRAM(s) Oral daily  atorvastatin 20 milliGRAM(s) Oral at bedtime  dextrose 5%. 1000 milliLiter(s) (50 mL/Hr) IV Continuous <Continuous>  dextrose 5%. 1000 milliLiter(s) (100 mL/Hr) IV Continuous <Continuous>  dextrose 50% Injectable 25 Gram(s) IV Push once  dextrose 50% Injectable 12.5 Gram(s) IV Push once  dextrose 50% Injectable 25 Gram(s) IV Push once  glucagon  Injectable 1 milliGRAM(s) IntraMuscular once  insulin lispro (ADMELOG) corrective regimen sliding scale   SubCutaneous every 6 hours  lisinopril 30 milliGRAM(s) Oral daily  melatonin 3 milliGRAM(s) Oral at bedtime  pantoprazole    Tablet 40 milliGRAM(s) Oral before breakfast    MEDICATIONS  (PRN):  dextrose Oral Gel 15 Gram(s) Oral once PRN Blood Glucose LESS THAN 70 milliGRAM(s)/deciliter      VITAL SIGNS:  T(F): 97.3 (01-11-23 @ 05:15), Max: 98.2 (01-10-23 @ 12:57)  HR: 85 (01-11-23 @ 05:15) (84 - 106)  BP: 156/72 (01-11-23 @ 05:15) (143/63 - 169/70)  RR: 17 (01-11-23 @ 05:15) (17 - 18)  SpO2: 97% (01-11-23 @ 05:15) (94% - 97%)    I&O's Summary    Daily     Daily     PHYSICAL EXAM:  Gen: Alert, NAD  HEENT: NCAT, conjunctiva clear, sclera anicteric, MMM  Neck: Supple, no JVD  CV: RRR, S1S2, no m/r/g, distant heart sounds  Resp: CTAB, normal respiratory effort, barrel chest  Abd: Soft, nontender, nondistended, normal bowel sounds  Ext: no edema, no clubbing or cyanosis  Neuro: AOx3, CN2-12 grossly intact, KELLEY  SKIN: warm, perfused    LABS:                        13.3   9.22  )-----------( 181      ( 10 Lars 2023 06:50 )             39.3     Hgb Trend: 13.3<--, 12.7<--, 12.3<--, 10.9<--, 12.2<--  01-10    133<L>  |  101  |  25<H>  ----------------------------<  178<H>  4.6   |  20<L>  |  0.88    Ca    9.9      10 Lars 2023 06:50  Phos  3.1     01-10  Mg     1.80     01-10    TPro  7.1  /  Alb  3.9  /  TBili  0.5  /  DBili  x   /  AST  32  /  ALT  20  /  AlkPhos  89  01-10    Creatinine Trend: 0.88<--, 0.88<--, 0.87<--, 0.90<--, 0.98<--, 0.81<--  LIVER FUNCTIONS - ( 10 Lars 2023 06:50 )  Alb: 3.9 g/dL / Pro: 7.1 g/dL / ALK PHOS: 89 U/L / ALT: 20 U/L / AST: 32 U/L / GGT: x           PT/INR - ( 10 Lars 2023 06:50 )   PT: 12.2 sec;   INR: 1.05 ratio      PTT - ( 10 Lars 2023 06:50 )  PTT:55.0 sec      CAPILLARY BLOOD GLUCOSE  POCT Blood Glucose.: 342 mg/dL (10 Lars 2023 21:16)  POCT Blood Glucose.: 356 mg/dL (10 Lars 2023 17:51)  POCT Blood Glucose.: 261 mg/dL (10 Lars 2023 12:11)  POCT Blood Glucose.: 155 mg/dL (10 Lars 2023 08:29)      RADIOLOGY & ADDITIONAL TESTS: Reviewed    Imaging Personally Reviewed:     Consultant(s) Notes Reviewed:  Pulmonology, Interventional Pulmonology    Care Discussed with Consultants/Other Providers: Pulmonology, Interventional Pulmonology   Marii Ortiz, PGY1  TEAMS or Pager [phone]     Patient is a 82y old  Male who presents with a chief complaint of Pleural effusion (10 Lars 2023 17:22)    [facility]  [facility], #[id number]    SUBJECTIVE/INTERVAL EVENTS: NAOE. Patient seen and examined at bedside today with son present. He is breathing comfortably on 1L O2 via NC. He endorses some discomfort at catheter site but denies excessive pain, bleeding, or worse SOB. Son notes patient weak and fatigued this morning, but then had improved energy with PO intake. Otherwise, denies chest pain, lightheadedness, nausea, vomiting, constipation, or diarrhea. Last BM yesterday at 10 pm.    MEDICATIONS  (STANDING):  amLODIPine   Tablet 10 milliGRAM(s) Oral daily  atorvastatin 20 milliGRAM(s) Oral at bedtime  dextrose 5%. 1000 milliLiter(s) (50 mL/Hr) IV Continuous <Continuous>  dextrose 5%. 1000 milliLiter(s) (100 mL/Hr) IV Continuous <Continuous>  dextrose 50% Injectable 25 Gram(s) IV Push once  dextrose 50% Injectable 12.5 Gram(s) IV Push once  dextrose 50% Injectable 25 Gram(s) IV Push once  glucagon  Injectable 1 milliGRAM(s) IntraMuscular once  insulin lispro (ADMELOG) corrective regimen sliding scale   SubCutaneous every 6 hours  lisinopril 30 milliGRAM(s) Oral daily  melatonin 3 milliGRAM(s) Oral at bedtime  pantoprazole    Tablet 40 milliGRAM(s) Oral before breakfast    MEDICATIONS  (PRN):  dextrose Oral Gel 15 Gram(s) Oral once PRN Blood Glucose LESS THAN 70 milliGRAM(s)/deciliter      VITAL SIGNS:  T(F): 97.3 (01-11-23 @ 05:15), Max: 98.2 (01-10-23 @ 12:57)  HR: 85 (01-11-23 @ 05:15) (84 - 106)  BP: 156/72 (01-11-23 @ 05:15) (143/63 - 169/70)  RR: 17 (01-11-23 @ 05:15) (17 - 18)  SpO2: 97% (01-11-23 @ 05:15) (94% - 97%)    I&O's Summary    Daily     Daily     PHYSICAL EXAM:  Gen: Alert, NAD  HEENT: NCAT, conjunctiva clear, sclera anicteric, MMM  Neck: Supple, no JVD  CV: RRR, S1S2, no m/r/g, distant heart sounds  Resp: CTAB, normal respiratory effort, barrel chest  - pleurx catheter with serosanguinous fluid in tubing, mildly TTP at stoma, no edema/erythema  Abd: Soft, nontender, nondistended, normal bowel sounds  Ext: no edema, no clubbing or cyanosis  Neuro: AOx3, CN2-12 grossly intact, KELLEY  SKIN: warm, perfused    LABS:                        13.3   9.22  )-----------( 181      ( 10 Lars 2023 06:50 )             39.3     Hgb Trend: 13.3<--, 12.7<--, 12.3<--, 10.9<--, 12.2<--  01-10    133<L>  |  101  |  25<H>  ----------------------------<  178<H>  4.6   |  20<L>  |  0.88    Ca    9.9      10 Lars 2023 06:50  Phos  3.1     01-10  Mg     1.80     01-10    TPro  7.1  /  Alb  3.9  /  TBili  0.5  /  DBili  x   /  AST  32  /  ALT  20  /  AlkPhos  89  01-10    Creatinine Trend: 0.88<--, 0.88<--, 0.87<--, 0.90<--, 0.98<--, 0.81<--  LIVER FUNCTIONS - ( 10 Lars 2023 06:50 )  Alb: 3.9 g/dL / Pro: 7.1 g/dL / ALK PHOS: 89 U/L / ALT: 20 U/L / AST: 32 U/L / GGT: x           PT/INR - ( 10 Lars 2023 06:50 )   PT: 12.2 sec;   INR: 1.05 ratio      PTT - ( 10 Lars 2023 06:50 )  PTT:55.0 sec      CAPILLARY BLOOD GLUCOSE  POCT Blood Glucose.: 342 mg/dL (10 Lars 2023 21:16)  POCT Blood Glucose.: 356 mg/dL (10 Lars 2023 17:51)  POCT Blood Glucose.: 261 mg/dL (10 Lars 2023 12:11)  POCT Blood Glucose.: 155 mg/dL (10 Lars 2023 08:29)      RADIOLOGY & ADDITIONAL TESTS: Reviewed    Imaging Personally Reviewed:     Consultant(s) Notes Reviewed:  Pulmonology, Interventional Pulmonology    Care Discussed with Consultants/Other Providers: Pulmonology, Interventional Pulmonology

## 2023-01-13 NOTE — PROGRESS NOTE ADULT - ATTENDING COMMENTS
Patient seen and examined, care plan discussed with house staff as above.    82yoM presenting with SOB, found to have AHRF 2/2 re-accumulation of exudative pleural effusion due to his newly diagnosed (12/2022) metastatic adenocarcinoma of the lung (not yet started on treatment). S/p thoracentesis on 1/6 w 900 ml of exudative pleural fluid drained with hemorrhagic component. Pulm placed pleurex on 1/11, CXR improved on 1/11 with drain appropriately placed, f/u with them re guidance on frequency and amount to drain per week, will need f/u in Pulm clinic. Planned for d/c home 1/12 with close Onc follow up. CM reports issues with insurance covering pleurex supplies and home nurse.     Will clarify why patient is on baby asa. No documentation of CAD in chart, clarified that there's no hx of CAD, d/c asa. Intermittent hyperglycemia noted this admission, f/u repeat A1c [6.6 ]. Prior documentation of A1c of 5.7. Taking metformin 1 g BID at home, resume at time of dc.

## 2023-01-13 NOTE — PROGRESS NOTE ADULT - NSPROGADDITIONALINFOA_GEN_ALL_CORE
Greater than 50 minutes spent with patient and on patient's care plan.

## 2023-01-13 NOTE — PROGRESS NOTE ADULT - PROBLEM SELECTOR PLAN 3
- with 25 pack year history of cigarette smoking; quit ~ 30 years ago  - followed by [facility]    Plan  - Oncology consulted, no plan for chemotherapy while inpatient  - patient has not initiated treatment for lung cancer yet due to recent diagnosis  - son reports appointment scheduled on 1/19 to begin immunotherapy

## 2023-01-13 NOTE — PROGRESS NOTE ADULT - PROBLEM SELECTOR PLAN 4
- Sys BP elevated 140s-160s recently    Plan  - c/w amlodipine 10 mg daily  - c/w lisinopril 40 mg daily (increased on 1/11)  - discontinue ASA 81 mg, pt was taking for primary ppx and denies hx of CAD/MI/CVA - Sys BP elevated 140s-160s recently    Plan  - c/w amlodipine 10 mg daily  - c/w lisinopril 40 mg daily (last increased on 1/11)  - discontinue ASA 81 mg, pt was taking for primary ppx and denies hx of CAD/MI/CVA

## 2023-01-13 NOTE — PROGRESS NOTE ADULT - ASSESSMENT
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 He is currently admitted for evaluation and management of shortness of breath secondary to recurrence of his malignant effusion. At this time, his dyspnea has improved following L sided therapeutic thoracentesis (900 mL) on 1/6. He is s/p placement of an indwelling pleural drain by the interventional pulmonology team on 1/11 with 600 cc fluid removed. Now pending coordination of home care and education for family about pleurx catheter. Oncology following as well, recommending outpatient follow-up on Thurs 1/19 to initiate immunotherapy for NSCLC.

## 2023-01-13 NOTE — PROGRESS NOTE ADULT - PROBLEM SELECTOR PLAN 6
- DVT ppx: lovenox 40 SQ daily  - Diet: consistent carb  - PT evaluation: home with no needs  - Dispo: pending course  - CM following, pt will need home oxygen and education on pleural catheter    Updated son Rash via phone ([phone])

## 2023-01-13 NOTE — PROGRESS NOTE ADULT - PROBLEM SELECTOR PLAN 1
- initially presented with 1 week of worsening JERNIGAN, orthopnea  - diagnosed with stage IV lung adenocarcinoma during recent admission 12/5-12/17  - thoracentesis on 12/12/22 with associated malignant pleural effusion  - CXR with TAVON mass, "moderate left pleural effusion with associated passive atelectasis"    Plan  - Pulmonology performed L sided therapeutic thoracentesis on 1/6 (900 mL removed)       - exudative by Light's criteria with fluid LDH 1389, likely 2/2 NSCLC       - Although pleural fluid LDH > 1000 and pleural glucose < 60, clinical picture is inconsistent with infection  - Now with improvement in SOB s/p thoracentesis, on 2L O2 via NC with SpO2 > 97%  - Pt with recurrent malignant pleural effusion       - s/p inpatient pleurx catheter placement by [facility] on 1/11 with 600 cc removed  - No signs of bleeding, worsening oxygenation, infection from catheter site today  - AN is following to set up home care for pleurx/education for family members - initially presented with 1 week of worsening JERNIGAN, orthopnea  - diagnosed with stage IV lung adenocarcinoma during recent admission 12/5-12/17  - thoracentesis on 12/12/22 with associated malignant pleural effusion  - CXR with TAVON mass, "moderate left pleural effusion with associated passive atelectasis"    Plan  - Pulmonology performed L sided therapeutic thoracentesis on 1/6 (900 mL removed)       - exudative by Light's criteria with fluid LDH 1389, likely 2/2 NSCLC       - Although pleural fluid LDH > 1000 and pleural glucose < 60, clinical picture is inconsistent with infection  - Now with improvement in SOB s/p thoracentesis, on 2L O2 via NC with SpO2 > 97%  - Pt with recurrent malignant pleural effusion       - s/p inpatient pleurx catheter placement by [facility] on 1/11 with 600 cc removed  - No signs of bleeding, worsening oxygenation, infection from catheter site today  - Can drain up to 1L every other day, will plan to train daughter on how to use catheter at bedside today  - CM is following to set up home care for pleurx/education for family members

## 2023-01-13 NOTE — PROGRESS NOTE ADULT - PROBLEM SELECTOR PLAN 5
- reportedly w/ FS 72 since admission and given juice, sandwich, but w/ repeat FS = 77  - Hb A1c 5.7, prediabetic, repeat A1c 6.6 on 1/12  - patient with AM glu 207 today, daytime 133-232 on 1/10, required 4 units ISS yesterday  - hyperglycemia likely due to patient eating outside food and frequent snacks    Plan  - FS glu qAC and qHS  - low dose ISS   - consistent carb diet  - encourage patient to limit snacks - reportedly w/ FS 72 since admission and given juice, sandwich, but w/ repeat FS = 77  - Hb A1c 5.7, prediabetic, repeat A1c 6.6 on 1/12  - patient with AM glu 236 today, daytime up to 300s, required 14 units ISS yesterday  - hyperglycemia likely due to patient eating outside food and frequent snacks    Plan  - FS glu qAC and qHS  - low dose ISS   - consistent carb diet  - encourage patient to limit snacks

## 2023-01-14 NOTE — PROGRESS NOTE ADULT - PROBLEM SELECTOR PLAN 4
- Sys BP elevated 140s-160s recently    Plan  - c/w amlodipine 10 mg daily  - c/w lisinopril 40 mg daily (last increased on 1/11)  - discontinue ASA 81 mg, pt was taking for primary ppx and denies hx of CAD/MI/CVA

## 2023-01-14 NOTE — PROGRESS NOTE ADULT - PROBLEM SELECTOR PLAN 6
- DVT ppx: lovenox 40 SQ daily  - Diet: consistent carb  - PT evaluation: home with no needs  - Dispo: pending course  - CM following, pt will need home oxygen and education on pleural catheter - DVT ppx: lovenox 40 SQ daily  - Diet: consistent carb  - PT evaluation: home with no needs  - Dispo: pending course  - CM following, pt will need coverage for pleurx supplies

## 2023-01-14 NOTE — PROGRESS NOTE ADULT - ATTENDING COMMENTS
82 y.o. Male w/ hx HTN, DM2, hyperlipidemia recently diagnosed with metastatic AdenoCA of lung 12/2022 not on treatment yet p/w SOB, found to have AHRF 2/2 re-accumulation of exudative pleural effusion now s/p thoracentesis on 1/6 w 900 ml of exudative pleural fluid drained with hemorrhagic component. Pulm placed pleurex on 1/11, CXR improved on 1/11 with drain appropriately placed. Pulm recs to drain up to 1L every other day. Will need f/u in Pulm clinic and close Onc follow up. CM reports issues with insurance covering pleurex supplies and home nurse.

## 2023-01-14 NOTE — PROGRESS NOTE ADULT - PROBLEM SELECTOR PLAN 1
- initially presented with 1 week of worsening JERNIGAN, orthopnea  - diagnosed with stage IV lung adenocarcinoma during recent admission 12/5-12/17  - thoracentesis on 12/12/22 with associated malignant pleural effusion  - CXR with TAVON mass, "moderate left pleural effusion with associated passive atelectasis"    Plan  - Pulmonology performed L sided therapeutic thoracentesis on 1/6 (900 mL removed)       - exudative by Light's criteria with fluid LDH 1389, likely 2/2 NSCLC       - Although pleural fluid LDH > 1000 and pleural glucose < 60, clinical picture is inconsistent with infection  - Now with improvement in SOB s/p thoracentesis, on 2L O2 via NC with SpO2 > 97%  - Pt with recurrent malignant pleural effusion       - s/p inpatient pleurx catheter placement by [facility] on 1/11 with 600 cc removed  - No signs of bleeding, worsening oxygenation, infection from catheter site  - Can drain up to 1L every other day, will plan to train daughter on how to use catheter at bedside  - CM is following to set up home care for pleurx/education for family members - initially presented with 1 week of worsening JERNIGAN, orthopnea  - diagnosed with stage IV lung adenocarcinoma during recent admission 12/5-12/17  - thoracentesis on 12/12/22 with associated malignant pleural effusion  - CXR with TAVON mass, "moderate left pleural effusion with associated passive atelectasis"    Plan  - S/p L sided therapeutic thoracentesis on 1/6 (900 mL removed)       - exudative by Light's criteria with fluid LDH 1389, likely 2/2 NSCLC  - S/p indwelling pleural catheter placement 1/11 with 600 cc removed       - no signs of bleeding, worsening oxygenation, infection from catheter site  - Appreciate Pulm recs to drain up to 1L every other day  - Daughter educated by RNs at bedside on how to drain pleurx catheter on 1/13  - AN is following to set up home care for pleurx/education for family members

## 2023-01-14 NOTE — PROGRESS NOTE ADULT - PROBLEM SELECTOR PLAN 5
- reportedly w/ FS 72 since admission and given juice, sandwich, but w/ repeat FS = 77  - Hb A1c 5.7, prediabetic, repeat A1c 6.6 on 1/12  - patient with AM glu 236 today, daytime up to 300s, required 14 units ISS yesterday  - hyperglycemia likely due to patient eating outside food and frequent snacks    Plan  - FS glu qAC and qHS  - low dose ISS   - consistent carb diet  - encourage patient to limit snacks - reportedly w/ FS 72 since admission and given juice, sandwich, but w/ repeat FS = 77  - Hb A1c 5.7, prediabetic, repeat A1c 6.6 on 1/12  - patient with AM glu 236 today, daytime up to 300s, required 14 units ISS yesterday  - hyperglycemia likely due to patient eating outside food and frequent snacks    Plan  - FS glu qAC and qHS  - low dose ISS  - start lantus 4 units HS  - consistent carb diet  - encourage patient to limit snacks

## 2023-01-14 NOTE — PROGRESS NOTE ADULT - PROBLEM SELECTOR PLAN 3
- with 25 pack year history of cigarette smoking; quit ~ 30 years ago  - followed by [facility]    Plan  - Oncology consulted, no plan for chemotherapy while inpatient  - patient has not initiated treatment for lung cancer yet due to recent diagnosis  - son reports appointment scheduled on 1/19 to begin immunotherapy

## 2023-01-14 NOTE — PROGRESS NOTE ADULT - SUBJECTIVE AND OBJECTIVE BOX
Marii Ortiz, PGY1  TEAMS or Pager [phone]     Patient is a 82y old  Male who presents with a chief complaint of Pleural effusion (10 Lars 2023 17:22)    [facility]  [place]u, #[id number]    SUBJECTIVE/INTERVAL EVENTS: NAOE. Patient seen and examined at bedside today. He is breathing comfortably on 1L O2 via NC. He endorses some discomfort at catheter site but denies excessive pain, bleeding, or worse SOB. Otherwise, denies chest pain, lightheadedness, nausea, vomiting, constipation, or diarrhea.    MEDICATIONS  (STANDING):  amLODIPine   Tablet 10 milliGRAM(s) Oral daily  atorvastatin 20 milliGRAM(s) Oral at bedtime  dextrose 5%. 1000 milliLiter(s) (50 mL/Hr) IV Continuous <Continuous>  dextrose 5%. 1000 milliLiter(s) (100 mL/Hr) IV Continuous <Continuous>  dextrose 50% Injectable 25 Gram(s) IV Push once  dextrose 50% Injectable 12.5 Gram(s) IV Push once  dextrose 50% Injectable 25 Gram(s) IV Push once  glucagon  Injectable 1 milliGRAM(s) IntraMuscular once  insulin lispro (ADMELOG) corrective regimen sliding scale   SubCutaneous every 6 hours  lisinopril 30 milliGRAM(s) Oral daily  melatonin 3 milliGRAM(s) Oral at bedtime  pantoprazole    Tablet 40 milliGRAM(s) Oral before breakfast    MEDICATIONS  (PRN):  dextrose Oral Gel 15 Gram(s) Oral once PRN Blood Glucose LESS THAN 70 milliGRAM(s)/deciliter      VITAL SIGNS:  T(F): 97.3 (01-11-23 @ 05:15), Max: 98.2 (01-10-23 @ 12:57)  HR: 85 (01-11-23 @ 05:15) (84 - 106)  BP: 156/72 (01-11-23 @ 05:15) (143/63 - 169/70)  RR: 17 (01-11-23 @ 05:15) (17 - 18)  SpO2: 97% (01-11-23 @ 05:15) (94% - 97%)    I&O's Summary    Daily     Daily     PHYSICAL EXAM:  Gen: Alert, NAD  HEENT: NCAT, conjunctiva clear, sclera anicteric, MMM  Neck: Supple, no JVD  CV: RRR, S1S2, no m/r/g, distant heart sounds  Resp: CTAB, normal respiratory effort, barrel chest  - pleurx catheter with serosanguinous fluid in tubing, mildly TTP at stoma, no edema/erythema  Abd: Soft, nontender, nondistended, normal bowel sounds  Ext: no edema, no clubbing or cyanosis  Neuro: AOx3, CN2-12 grossly intact, KELLEY  SKIN: warm, perfused    LABS:                        13.3   9.22  )-----------( 181      ( 10 Lars 2023 06:50 )             39.3     Hgb Trend: 13.3<--, 12.7<--, 12.3<--, 10.9<--, 12.2<--  01-10    133<L>  |  101  |  25<H>  ----------------------------<  178<H>  4.6   |  20<L>  |  0.88    Ca    9.9      10 Lars 2023 06:50  Phos  3.1     01-10  Mg     1.80     01-10    TPro  7.1  /  Alb  3.9  /  TBili  0.5  /  DBili  x   /  AST  32  /  ALT  20  /  AlkPhos  89  01-10    Creatinine Trend: 0.88<--, 0.88<--, 0.87<--, 0.90<--, 0.98<--, 0.81<--  LIVER FUNCTIONS - ( 10 Lars 2023 06:50 )  Alb: 3.9 g/dL / Pro: 7.1 g/dL / ALK PHOS: 89 U/L / ALT: 20 U/L / AST: 32 U/L / GGT: x           PT/INR - ( 10 Lars 2023 06:50 )   PT: 12.2 sec;   INR: 1.05 ratio      PTT - ( 10 Lars 2023 06:50 )  PTT:55.0 sec      CAPILLARY BLOOD GLUCOSE  POCT Blood Glucose.: 342 mg/dL (10 Lars 2023 21:16)  POCT Blood Glucose.: 356 mg/dL (10 Lars 2023 17:51)  POCT Blood Glucose.: 261 mg/dL (10 Lars 2023 12:11)  POCT Blood Glucose.: 155 mg/dL (10 Lars 2023 08:29)      RADIOLOGY & ADDITIONAL TESTS: Reviewed    Imaging Personally Reviewed:     Consultant(s) Notes Reviewed:  Pulmonology, Interventional Pulmonology    Care Discussed with Consultants/Other Providers: Pulmonology, Interventional Pulmonology   Marii Ortiz, PGY1  TEAMS or Pager [phone]     Patient is a 82y old  Male who presents with a chief complaint of Pleural effusion (10 Lars 2023 17:22)    [place]  [place], #[id number]    SUBJECTIVE/INTERVAL EVENTS: Yesterday around 6 pm, patient had 1 L drained from pleurx catheter. Daughter was educated at bedside by RNs on how to perform pleurx drainage. Patient seen and examined at bedside today. He is breathing comfortably on 2L O2 via NC. He reports improvement in SOB and weakness since pleurx was drained. He endorses some discomfort at catheter site but denies excessive pain, bleeding, or worse SOB. He has been mostly eating food from home. Otherwise, denies chest pain, lightheadedness, nausea, vomiting, constipation, or diarrhea.    MEDICATIONS  (STANDING):  amLODIPine   Tablet 10 milliGRAM(s) Oral daily  atorvastatin 20 milliGRAM(s) Oral at bedtime  dextrose 5%. 1000 milliLiter(s) (50 mL/Hr) IV Continuous <Continuous>  dextrose 5%. 1000 milliLiter(s) (100 mL/Hr) IV Continuous <Continuous>  dextrose 50% Injectable 25 Gram(s) IV Push once  dextrose 50% Injectable 12.5 Gram(s) IV Push once  dextrose 50% Injectable 25 Gram(s) IV Push once  glucagon  Injectable 1 milliGRAM(s) IntraMuscular once  insulin lispro (ADMELOG) corrective regimen sliding scale   SubCutaneous every 6 hours  lisinopril 30 milliGRAM(s) Oral daily  melatonin 3 milliGRAM(s) Oral at bedtime  pantoprazole    Tablet 40 milliGRAM(s) Oral before breakfast    MEDICATIONS  (PRN):  dextrose Oral Gel 15 Gram(s) Oral once PRN Blood Glucose LESS THAN 70 milliGRAM(s)/deciliter    VITAL SIGNS:  T(C): 36.6 (01-14-23 @ 04:33), Max: 37.4 (01-13-23 @ 12:35)  T(F): 97.8 (01-14-23 @ 04:33), Max: 99.4 (01-13-23 @ 12:35)  HR: 83 (01-14-23 @ 04:33) (83 - 100)  BP: 154/70 (01-14-23 @ 04:33) (135/73 - 154/70)  RR: 18 (01-14-23 @ 04:33) (18 - 19)  SpO2: 96% (01-14-23 @ 04:33) (94% - 96%)  Wt(kg): --    I&O's Summary    Daily     Daily     PHYSICAL EXAM:  Gen: Alert, NAD  HEENT: NCAT, conjunctiva clear, sclera anicteric, MMM  Neck: Supple, no JVD  CV: RRR, S1S2, no m/r/g, distant heart sounds  Resp: CTAB, normal respiratory effort, barrel chest  - pleurx catheter with serosanguinous fluid in tubing, mildly TTP at stoma, no edema/erythema  Abd: Soft, nontender, nondistended, normal bowel sounds  Ext: no edema, no clubbing or cyanosis  Neuro: AOx3, CN2-12 grossly intact, KELLEY  SKIN: warm, perfused    LABS:                        13.3   9.22  )-----------( 181      ( 10 Lars 2023 06:50 )             39.3     Hgb Trend: 13.3<--, 12.7<--, 12.3<--, 10.9<--, 12.2<--  01-10    133<L>  |  101  |  25<H>  ----------------------------<  178<H>  4.6   |  20<L>  |  0.88    Ca    9.9      10 Lars 2023 06:50  Phos  3.1     01-10  Mg     1.80     01-10    TPro  7.1  /  Alb  3.9  /  TBili  0.5  /  DBili  x   /  AST  32  /  ALT  20  /  AlkPhos  89  01-10    Creatinine Trend: 0.88<--, 0.88<--, 0.87<--, 0.90<--, 0.98<--, 0.81<--  LIVER FUNCTIONS - ( 10 Lars 2023 06:50 )  Alb: 3.9 g/dL / Pro: 7.1 g/dL / ALK PHOS: 89 U/L / ALT: 20 U/L / AST: 32 U/L / GGT: x           PT/INR - ( 10 Lars 2023 06:50 )   PT: 12.2 sec;   INR: 1.05 ratio      PTT - ( 10 Lars 2023 06:50 )  PTT:55.0 sec    CAPILLARY BLOOD GLUCOSE  POCT Blood Glucose.: 342 mg/dL (10 Lars 2023 21:16)  POCT Blood Glucose.: 356 mg/dL (10 Lars 2023 17:51)  POCT Blood Glucose.: 261 mg/dL (10 Lars 2023 12:11)  POCT Blood Glucose.: 155 mg/dL (10 Lars 2023 08:29)    RADIOLOGY & ADDITIONAL TESTS: Reviewed    Imaging Personally Reviewed:     Consultant(s) Notes Reviewed:  Pulmonology, Interventional Pulmonology    Care Discussed with Consultants/Other Providers: Pulmonology, Interventional Pulmonology

## 2023-01-15 NOTE — PROGRESS NOTE ADULT - SUBJECTIVE AND OBJECTIVE BOX
***********************************************  Gurinder Meeks MD  Internal Medicine   PGY2  ***********************************************      PROGRESS NOTE:     Patient is a 82y old  Male who presents with a chief complaint of Pleural effusion (14 Jan 2023 05:58)      SUBJECTIVE / OVERNIGHT EVENTS:    OVERNIGHT:       Patient examined at bedside        MEDICATIONS  (STANDING):  amLODIPine   Tablet 10 milliGRAM(s) Oral daily  atorvastatin 20 milliGRAM(s) Oral at bedtime  dextrose 5%. 1000 milliLiter(s) (50 mL/Hr) IV Continuous <Continuous>  dextrose 5%. 1000 milliLiter(s) (100 mL/Hr) IV Continuous <Continuous>  dextrose 50% Injectable 25 Gram(s) IV Push once  dextrose 50% Injectable 12.5 Gram(s) IV Push once  dextrose 50% Injectable 25 Gram(s) IV Push once  enoxaparin Injectable 40 milliGRAM(s) SubCutaneous every 24 hours  glucagon  Injectable 1 milliGRAM(s) IntraMuscular once  insulin glargine Injectable (LANTUS) 4 Unit(s) SubCutaneous at bedtime  insulin lispro (ADMELOG) corrective regimen sliding scale   SubCutaneous three times a day before meals  insulin lispro (ADMELOG) corrective regimen sliding scale   SubCutaneous at bedtime  lisinopril 40 milliGRAM(s) Oral daily  melatonin 3 milliGRAM(s) Oral at bedtime  pantoprazole    Tablet 40 milliGRAM(s) Oral before breakfast    MEDICATIONS  (PRN):  dextrose Oral Gel 15 Gram(s) Oral once PRN Blood Glucose LESS THAN 70 milliGRAM(s)/deciliter      CAPILLARY BLOOD GLUCOSE      POCT Blood Glucose.: 373 mg/dL (14 Jan 2023 22:24)  POCT Blood Glucose.: 337 mg/dL (14 Jan 2023 17:45)  POCT Blood Glucose.: 376 mg/dL (14 Jan 2023 12:26)  POCT Blood Glucose.: 275 mg/dL (14 Jan 2023 08:21)    I&O's Summary      PHYSICAL EXAM:  Vital Signs Last 24 Hrs  T(C): 36.6 (15 Lars 2023 04:36), Max: 37.1 (14 Jan 2023 13:07)  T(F): 97.8 (15 Lars 2023 04:36), Max: 98.8 (14 Jan 2023 13:07)  HR: 85 (15 Lars 2023 04:36) (85 - 89)  BP: 150/72 (15 Lars 2023 04:36) (137/93 - 150/72)  BP(mean): --  RR: 17 (15 Lars 2023 04:36) (17 - 17)  SpO2: 97% (15 Lars 2023 04:36) (97% - 97%)    Parameters below as of 15 Lars 2023 04:36  Patient On (Oxygen Delivery Method): room air        CONSTITUTIONAL: NAD; well-developed  HEENT: PERRL, clear conjunctiva  RESPIRATORY: Normal respiratory effort; lungs are clear to auscultation bilaterally; No Crackles/Rhonchi/Wheezing  CARDIOVASCULAR: Regular rate and rhythm, normal S1 and S2, no murmur/rub/gallop; No lower extremity edema; Peripheral pulses are 2+ bilaterally  ABDOMEN: Nontender to palpation, normoactive bowel sounds, no rebound/guarding; No hepatosplenomegaly  MUSCULOSKELETAL: no clubbing or cyanosis of digits; no joint swelling or tenderness to palpation  EXTREMITY: Lower extremities Non-tender to palpation; non-erythematous B/L  NEURO: A&Ox3; no focal deficits   PSYCH: normal mood; Affect appropirate    LABS:                        12.3   8.93  )-----------( 209      ( 15 Lars 2023 05:04 )             37.1     01-15    133<L>  |  99  |  28<H>  ----------------------------<  245<H>  4.7   |  21<L>  |  0.90    Ca    9.5      15 Lars 2023 05:04  Phos  3.2     01-15  Mg     1.80     01-15                  RADIOLOGY & ADDITIONAL TESTS:  Results Reviewed:   Imaging Personally Reviewed:  Electrocardiogram Personally Reviewed:    COORDINATION OF CARE:  Care Discussed with Consultants/Other Providers [Y/N]:  Prior or Outpatient Records Reviewed [Y/N]:   ***********************************************  Gurinder Meeks MD  Internal Medicine   PGY2  ***********************************************      PROGRESS NOTE:     Patient is a 82y old  Male who presents with a chief complaint of Pleural effusion (14 Jan 2023 05:58)      SUBJECTIVE / OVERNIGHT EVENTS:    OVERNIGHT:   No events     Patient examined at bedside w no acute complaints. At [facility] requirements        MEDICATIONS  (STANDING):  amLODIPine   Tablet 10 milliGRAM(s) Oral daily  atorvastatin 20 milliGRAM(s) Oral at bedtime  dextrose 5%. 1000 milliLiter(s) (50 mL/Hr) IV Continuous <Continuous>  dextrose 5%. 1000 milliLiter(s) (100 mL/Hr) IV Continuous <Continuous>  dextrose 50% Injectable 25 Gram(s) IV Push once  dextrose 50% Injectable 12.5 Gram(s) IV Push once  dextrose 50% Injectable 25 Gram(s) IV Push once  enoxaparin Injectable 40 milliGRAM(s) SubCutaneous every 24 hours  glucagon  Injectable 1 milliGRAM(s) IntraMuscular once  insulin glargine Injectable (LANTUS) 4 Unit(s) SubCutaneous at bedtime  insulin lispro (ADMELOG) corrective regimen sliding scale   SubCutaneous three times a day before meals  insulin lispro (ADMELOG) corrective regimen sliding scale   SubCutaneous at bedtime  lisinopril 40 milliGRAM(s) Oral daily  melatonin 3 milliGRAM(s) Oral at bedtime  pantoprazole    Tablet 40 milliGRAM(s) Oral before breakfast    MEDICATIONS  (PRN):  dextrose Oral Gel 15 Gram(s) Oral once PRN Blood Glucose LESS THAN 70 milliGRAM(s)/deciliter      CAPILLARY BLOOD GLUCOSE      POCT Blood Glucose.: 373 mg/dL (14 Jan 2023 22:24)  POCT Blood Glucose.: 337 mg/dL (14 Jan 2023 17:45)  POCT Blood Glucose.: 376 mg/dL (14 Jan 2023 12:26)  POCT Blood Glucose.: 275 mg/dL (14 Jan 2023 08:21)    I&O's Summary      PHYSICAL EXAM:  Vital Signs Last 24 Hrs  T(C): 36.6 (15 Lars 2023 04:36), Max: 37.1 (14 Jan 2023 13:07)  T(F): 97.8 (15 Lars 2023 04:36), Max: 98.8 (14 Jan 2023 13:07)  HR: 85 (15 Lars 2023 04:36) (85 - 89)  BP: 150/72 (15 Lars 2023 04:36) (137/93 - 150/72)  BP(mean): --  RR: 17 (15 Lars 2023 04:36) (17 - 17)  SpO2: 97% (15 Lars 2023 04:36) (97% - 97%)    Parameters below as of 15 Lars 2023 04:36  Patient On (Oxygen Delivery Method): room air        Gen: Alert, NAD  HEENT: NCAT, conjunctiva clear, sclera anicteric, MMM  Neck: Supple, no JVD  CV: RRR, S1S2, no m/r/g, distant heart sounds  Resp: CTAB, normal respiratory effort, barrel chest  - pleurx catheter with serosanguinous fluid in tubing, mildly TTP at stoma, no edema/erythema  Abd: Soft, nontender, nondistended, normal bowel sounds  Ext: no edema, no clubbing or cyanosis  Neuro: AOx3, CN2-12 grossly intact, KELLEY  SKIN: warm, perfused    LABS:                        12.3   8.93  )-----------( 209      ( 15 Lars 2023 05:04 )             37.1     01-15    133<L>  |  99  |  28<H>  ----------------------------<  245<H>  4.7   |  21<L>  |  0.90    Ca    9.5      15 Lars 2023 05:04  Phos  3.2     01-15  Mg     1.80     01-15                  RADIOLOGY & ADDITIONAL TESTS:  Results Reviewed:   Imaging Personally Reviewed:  Electrocardiogram Personally Reviewed:    COORDINATION OF CARE:  Care Discussed with Consultants/Other Providers [Y/N]:  Prior or Outpatient Records Reviewed [Y/N]:

## 2023-01-15 NOTE — PROGRESS NOTE ADULT - ATTENDING COMMENTS
82 y.o. Male w/ hx HTN, DM2, hyperlipidemia recently diagnosed with metastatic AdenoCA of lung 12/2022 not on treatment yet p/w SOB, found to have AHRF 2/2 re-accumulation of exudative pleural effusion now s/p thoracentesis on 1/6 w 900 ml of exudative pleural fluid drained with hemorrhagic component. Pulm placed pleurex on 1/11, CXR improved on 1/11 with drain appropriately placed. Pulm recs to drain up to 1L every other day. Will need f/u in Pulm clinic and close Onc follow up. CM reports issues with insurance covering pleurex supplies and home nurse. Issue won't be resolved until after the holidays.

## 2023-01-15 NOTE — PROGRESS NOTE ADULT - PROBLEM SELECTOR PLAN 6
- DVT ppx: lovenox 40 SQ daily  - Diet: consistent carb  - PT evaluation: home with no needs  - Dispo: pending course  - CM following, pt will need coverage for pleurx supplies

## 2023-01-15 NOTE — PROGRESS NOTE ADULT - PROBLEM SELECTOR PLAN 1
- initially presented with 1 week of worsening JERNIGAN, orthopnea  - diagnosed with stage IV lung adenocarcinoma during recent admission 12/5-12/17  - thoracentesis on 12/12/22 with associated malignant pleural effusion  - CXR with TAVON mass, "moderate left pleural effusion with associated passive atelectasis"    Plan  - S/p L sided therapeutic thoracentesis on 1/6 (900 mL removed)       - exudative by Light's criteria with fluid LDH 1389, likely 2/2 NSCLC  - S/p indwelling pleural catheter placement 1/11 with 600 cc removed       - no signs of bleeding, worsening oxygenation, infection from catheter site  - Appreciate Pulm recs to drain up to 1L every other day  - Daughter educated by RNs at bedside on how to drain pleurx catheter on 1/13  - AN is following to set up home care for pleurx/education for family members

## 2023-01-15 NOTE — PROGRESS NOTE ADULT - PROBLEM SELECTOR PLAN 3
- with 25 pack year history of cigarette smoking; quit ~ 30 years ago  - followed by [facility]    Plan  - Oncology consulted, no plan for chemotherapy while inpatient  - patient has not initiated treatment for lung cancer yet due to recent diagnosis  - son reports appointment scheduled on 1/19 to begin immunotherapy

## 2023-01-15 NOTE — PROGRESS NOTE ADULT - ASSESSMENT
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 He is currently admitted for evaluation and management of shortness of breath secondary to recurrence of his malignant effusion. At this time, his dyspnea has improved following L sided therapeutic thoracentesis (900 mL) on 1/6. He is s/p placement of an indwelling pleural drain by the interventional pulmonology team on 1/11 with 600 cc fluid removed. Now pending coordination of home care and education for family about pleurx catheter. Oncology following as well, recommending outpatient follow-up on Thurs 1/19 to initiate immunotherapy for NSCLC. Dispo pending home Plx setup.

## 2023-01-15 NOTE — PROGRESS NOTE ADULT - PROBLEM SELECTOR PLAN 5
- reportedly w/ FS 72 since admission and given juice, sandwich, but w/ repeat FS = 77  - Hb A1c 5.7, prediabetic, repeat A1c 6.6 on 1/12  - patient with AM glu 236 today, daytime up to 300s, required 14 units ISS yesterday  - hyperglycemia likely due to patient eating outside food and frequent snacks    Plan  - FS glu qAC and qHS  - low dose ISS  - start lantus 4 units HS  - consistent carb diet  - encourage patient to limit snacks

## 2023-01-16 NOTE — PROGRESS NOTE ADULT - ATTENDING COMMENTS
82 y.o. Male w/ hx HTN, DM2, hyperlipidemia recently diagnosed with metastatic AdenoCA of lung 12/2022 not on treatment yet p/w SOB, found to have AHRF 2/2 re-accumulation of exudative pleural effusion now s/p thoracentesis on 1/6 w 900 ml of exudative pleural fluid drained with hemorrhagic component. Pulm placed pleurex on 1/11, CXR improved on 1/11 with drain appropriately placed. Pulm recs to drain up to 1L every other day. Will need f/u in Pulm clinic and close Onc follow up. CM reports issues with insurance covering pleurex supplies and home nurse. Family can't afford the expense required for pleurex supplies. F/U SW recs. Patient with hyperglycemia; increase Lantus to 14 Units qHS.

## 2023-01-16 NOTE — PROGRESS NOTE ADULT - PROBLEM SELECTOR PLAN 3
- with 25 pack year history of cigarette smoking; quit ~ 30 years ago  - followed by [facility]    Plan  - Oncology consulted, no plan for chemotherapy while inpatient  - patient has not initiated treatment for lung cancer yet due to recent diagnosis  - son reports appointment scheduled on 1/19 to begin immunotherapy

## 2023-01-16 NOTE — PROGRESS NOTE ADULT - PROBLEM SELECTOR PLAN 4
- Sys BP elevated 140s-160s recently    Plan  - c/w amlodipine 10 mg daily  - c/w lisinopril 40 mg daily (last increased on 1/11)  - discontinue ASA 81 mg, pt was taking for primary ppx and denies hx of CAD/MI/CVA - Sys BP elevated 140s-160s recently    Plan  - c/w amlodipine 10 mg daily  - c/w lisinopril 40 mg daily (last increased on 1/11)  - discontinue ASA 81 mg, pt was taking for primary ppx and denies hx of CAD/MI/CVA which would be class III recommendation.

## 2023-01-16 NOTE — PROGRESS NOTE ADULT - PROBLEM SELECTOR PLAN 5
- reportedly w/ FS 72 since admission and given juice, sandwich, but w/ repeat FS = 77  - Hb A1c 5.7, prediabetic, repeat A1c 6.6 on 1/12  - patient with AM glu 236 today, daytime up to 300s, required 14 units ISS yesterday  - hyperglycemia likely due to patient eating outside food and frequent snacks    Plan  - FS glu qAC and qHS  - low dose ISS  - start lantus 4 units HS  - consistent carb diet  - encourage patient to limit snacks - reportedly w/ FS 72 since admission and given juice, sandwich, but w/ repeat FS = 77  - Hb A1c 5.7, prediabetic, repeat A1c 6.6 on 1/12  - patient with AM glu 236 today, daytime up to 300s, required 14 units ISS yesterday  - hyperglycemia likely due to patient eating outside food and frequent snacks    Plan  - FS glu qAC and qHS  - low dose ISS  - titrate lantus  - consistent carb diet  - encourage patient to limit snacks

## 2023-01-16 NOTE — PROGRESS NOTE ADULT - SUBJECTIVE AND OBJECTIVE BOX
Internal Medicine   Meladomenica Melgar | PGY-2    OVERNIGHT EVENTS:      SUBJECTIVE:       MEDICATIONS  (STANDING):  amLODIPine   Tablet 10 milliGRAM(s) Oral daily  atorvastatin 20 milliGRAM(s) Oral at bedtime  dextrose 5%. 1000 milliLiter(s) (50 mL/Hr) IV Continuous <Continuous>  dextrose 5%. 1000 milliLiter(s) (100 mL/Hr) IV Continuous <Continuous>  dextrose 50% Injectable 25 Gram(s) IV Push once  dextrose 50% Injectable 12.5 Gram(s) IV Push once  dextrose 50% Injectable 25 Gram(s) IV Push once  enoxaparin Injectable 40 milliGRAM(s) SubCutaneous every 24 hours  glucagon  Injectable 1 milliGRAM(s) IntraMuscular once  insulin glargine Injectable (LANTUS) 10 Unit(s) SubCutaneous at bedtime  insulin lispro (ADMELOG) corrective regimen sliding scale   SubCutaneous three times a day before meals  insulin lispro (ADMELOG) corrective regimen sliding scale   SubCutaneous at bedtime  lisinopril 40 milliGRAM(s) Oral daily  melatonin 3 milliGRAM(s) Oral at bedtime  pantoprazole    Tablet 40 milliGRAM(s) Oral before breakfast    MEDICATIONS  (PRN):  dextrose Oral Gel 15 Gram(s) Oral once PRN Blood Glucose LESS THAN 70 milliGRAM(s)/deciliter        T(F): 98.2 (01-16-23 @ 05:12), Max: 98.2 (01-16-23 @ 05:12)  HR: 88 (01-16-23 @ 05:12) (88 - 96)  BP: 144/68 (01-16-23 @ 05:12) (144/68 - 156/61)  BP(mean): --  RR: 17 (01-16-23 @ 05:12) (17 - 20)  SpO2: 100% (01-16-23 @ 05:12) (96% - 100%)    PHYSICAL EXAM:     GENERAL: NAD, lying in bed comfortably  HEAD:  Atraumatic, Normocephalic  EYES: EOMI, PERRLA, conjunctiva and sclera clear, no nystagmus noted  ENT: Moist mucous membranes,   NECK: Supple, No JVD, trachea midline  CHEST/LUNG: Clear to auscultation bilaterally; No rales, rhonchi, wheezing, or rubs. Unlabored respirations  HEART: Regular rate and rhythm; No murmurs, rubs, or gallops, normal S1/S2  ABDOMEN: normal bowel sounds; Soft, nontender, nondistended, no organomegaly   EXTREMITIES:  2+ Peripheral Pulses, brisk capillary refill. No clubbing, cyanosis, or edema  MSK: No gross deformities noted   Neurological:  A&Ox3, no focal deficits   SKIN: No rashes or lesions  PSYCH: Normal mood, affect     TELEMETRY:    LABS:                        12.9   11.44 )-----------( 267      ( 16 Jan 2023 05:40 )             38.1     01-15    133<L>  |  99  |  28<H>  ----------------------------<  245<H>  4.7   |  21<L>  |  0.90    Ca    9.5      15 Lars 2023 05:04  Phos  3.2     01-15  Mg     1.80     01-15              Creatinine Trend: 0.90<--, 0.98<--, 0.99<--, 1.05<--, 0.88<--, 0.88<--  I&O's Summary    15 Lars 2023 07:01  -  16 Jan 2023 07:00  --------------------------------------------------------  IN: 0 mL / OUT: 300 mL / NET: -300 mL      BNP    RADIOLOGY & ADDITIONAL STUDIES:             Internal Medicine   Jillian Melgar | PGY-2    OVERNIGHT EVENTS: No overnight events    SUBJECTIVE: No complaints. Feels well. Denies constitutional symptoms such as fever, chills. Feels at pleurx site is well controlled      MEDICATIONS  (STANDING):  amLODIPine   Tablet 10 milliGRAM(s) Oral daily  atorvastatin 20 milliGRAM(s) Oral at bedtime  dextrose 5%. 1000 milliLiter(s) (50 mL/Hr) IV Continuous <Continuous>  dextrose 5%. 1000 milliLiter(s) (100 mL/Hr) IV Continuous <Continuous>  dextrose 50% Injectable 25 Gram(s) IV Push once  dextrose 50% Injectable 12.5 Gram(s) IV Push once  dextrose 50% Injectable 25 Gram(s) IV Push once  enoxaparin Injectable 40 milliGRAM(s) SubCutaneous every 24 hours  glucagon  Injectable 1 milliGRAM(s) IntraMuscular once  insulin glargine Injectable (LANTUS) 10 Unit(s) SubCutaneous at bedtime  insulin lispro (ADMELOG) corrective regimen sliding scale   SubCutaneous three times a day before meals  insulin lispro (ADMELOG) corrective regimen sliding scale   SubCutaneous at bedtime  lisinopril 40 milliGRAM(s) Oral daily  melatonin 3 milliGRAM(s) Oral at bedtime  pantoprazole    Tablet 40 milliGRAM(s) Oral before breakfast    MEDICATIONS  (PRN):  dextrose Oral Gel 15 Gram(s) Oral once PRN Blood Glucose LESS THAN 70 milliGRAM(s)/deciliter        T(F): 98.2 (01-16-23 @ 05:12), Max: 98.2 (01-16-23 @ 05:12)  HR: 88 (01-16-23 @ 05:12) (88 - 96)  BP: 144/68 (01-16-23 @ 05:12) (144/68 - 156/61)  BP(mean): --  RR: 17 (01-16-23 @ 05:12) (17 - 20)  SpO2: 100% (01-16-23 @ 05:12) (96% - 100%)    PHYSICAL EXAM:     HEENT: NCAT, conjunctiva clear, sclera anicteric, MMM  Neck: Supple, no JVD  CV: RRR, S1S2, no m/r/g, distant heart sounds  Resp: CTAB, normal respiratory effort, barrel chest  - pleurx catheter with serosanguinous fluid in tubing, mildly TTP at stoma, no edema/erythema; nontender to palpation  Abd: Soft, nontender, nondistended, normal bowel sounds  Ext: no edema, no clubbing or cyanosis  Neuro: AOx3, CN2-12 grossly intact, KELLEY  SKIN: warm, perfused    TELEMETRY:    LABS:                        12.9   11.44 )-----------( 267      ( 16 Jan 2023 05:40 )             38.1     01-15    133<L>  |  99  |  28<H>  ----------------------------<  245<H>  4.7   |  21<L>  |  0.90    Ca    9.5      15 Lars 2023 05:04  Phos  3.2     01-15  Mg     1.80     01-15              Creatinine Trend: 0.90<--, 0.98<--, 0.99<--, 1.05<--, 0.88<--, 0.88<--  I&O's Summary    15 Lars 2023 07:01  -  16 Jan 2023 07:00  --------------------------------------------------------  IN: 0 mL / OUT: 300 mL / NET: -300 mL      BNP    RADIOLOGY & ADDITIONAL STUDIES:

## 2023-01-16 NOTE — PROGRESS NOTE ADULT - PROBLEM SELECTOR PLAN 2
- persistent respiratory failure noted subsequent to thoracentesis on 1/6  - currently maintaining SpO2 > 95% on 2 L/min by NC, but oxygen saturation decreases and becomes tachypneic when placed on room air and attempts to ambulate   - respiratory failure is most likely secondary to recurrent L sided pleural effusion    Plan  - will continue to administer oxygen by nasal cannula with goal SpO2 > 92%  - it is most likely that the patient will require oxygen on discharge (baseline home O2 2-3L NC) - persistent respiratory failure noted subsequent to thoracentesis on 1/6  - currently maintaining SpO2 > 95% on 2 L/min by NC, but oxygen saturation decreases and becomes tachypneic when placed on room air and attempts to ambulate   - respiratory failure is most likely secondary to recurrent L sided pleural effusion    Plan  - will continue to administer oxygen by nasal cannula with goal SpO2 > 92%  - it is most likely that the patient will require oxygen on discharge (baseline home O2 2-3L NC). Already was on home oxygen previous to admission

## 2023-01-17 NOTE — PROGRESS NOTE ADULT - PROBLEM SELECTOR PLAN 2
- persistent respiratory failure noted subsequent to thoracentesis on 1/6  - currently maintaining SpO2 > 95% on 2 L/min by NC, but oxygen saturation decreases and becomes tachypneic when placed on room air and attempts to ambulate   - respiratory failure is most likely secondary to recurrent L sided pleural effusion    Plan  - will continue to administer oxygen by nasal cannula with goal SpO2 > 92%  - it is most likely that the patient will require oxygen on discharge (baseline home O2 2-3L NC). Already was on home oxygen previous to admission

## 2023-01-17 NOTE — PROGRESS NOTE ADULT - SUBJECTIVE AND OBJECTIVE BOX
INTERVAL HPI/OVERNIGHT EVENTS:  Patient seen at bedside.  Patient with improved symptoms  Resting comfortably in bed  No acute complaints    Translation provided by patient son Prabhjot who was available via telephone  per patient request      VITAL SIGNS:  T(F): 97.9 (01-17-23 @ 15:40)  HR: 95 (01-17-23 @ 15:40)  BP: 154/71 (01-17-23 @ 15:40)  RR: 18 (01-17-23 @ 15:40)  SpO2: 96% (01-17-23 @ 15:40)  Wt(kg): --    PHYSICAL EXAM:    HEENT: NCAT, conjunctiva clear, sclera anicteric, MMM  Neck: Supple, no JVD  CV: RRR, S1S2, no m/r/g, distant heart sounds  Resp: CTAB, normal respiratory effort, barrel chest  - pleurx catheter with serosanguinous fluid in tubing, mildly TTP at stoma, no edema/erythema; nontender to palpation  Abd: Soft, nontender, nondistended, normal bowel sounds  Ext: no edema, no clubbing or cyanosis  Neuro: AOx3, CN2-12 grossly intact, KELLEY  SKIN: warm, perfused        MEDICATIONS  (STANDING):  amLODIPine   Tablet 10 milliGRAM(s) Oral daily  atorvastatin 20 milliGRAM(s) Oral at bedtime  dextrose 5%. 1000 milliLiter(s) (50 mL/Hr) IV Continuous <Continuous>  dextrose 5%. 1000 milliLiter(s) (100 mL/Hr) IV Continuous <Continuous>  dextrose 50% Injectable 25 Gram(s) IV Push once  dextrose 50% Injectable 12.5 Gram(s) IV Push once  dextrose 50% Injectable 25 Gram(s) IV Push once  enoxaparin Injectable 40 milliGRAM(s) SubCutaneous every 24 hours  glucagon  Injectable 1 milliGRAM(s) IntraMuscular once  insulin glargine Injectable (LANTUS) 14 Unit(s) SubCutaneous at bedtime  insulin lispro (ADMELOG) corrective regimen sliding scale   SubCutaneous three times a day before meals  insulin lispro (ADMELOG) corrective regimen sliding scale   SubCutaneous at bedtime  lisinopril 40 milliGRAM(s) Oral daily  melatonin 3 milliGRAM(s) Oral at bedtime  pantoprazole    Tablet 40 milliGRAM(s) Oral before breakfast    MEDICATIONS  (PRN):  dextrose Oral Gel 15 Gram(s) Oral once PRN Blood Glucose LESS THAN 70 milliGRAM(s)/deciliter      Allergies    No Known Allergies    Intolerances        LABS:                        11.4   8.69  )-----------( 218      ( 17 Jan 2023 05:55 )             33.0     01-17    135  |  99  |  31<H>  ----------------------------<  185<H>  4.6   |  24  |  0.94    Ca    9.4      17 Jan 2023 05:55  Phos  3.5     01-17  Mg     1.70     01-17            RADIOLOGY & ADDITIONAL TESTS:  Studies reviewed.

## 2023-01-17 NOTE — PROGRESS NOTE ADULT - PROBLEM SELECTOR PLAN 5
- reportedly w/ FS 72 since admission and given juice, sandwich, but w/ repeat FS = 77  - Hb A1c 5.7, prediabetic, repeat A1c 6.6 on 1/12  - patient with AM glu 236 today, daytime up to 300s, required 14 units ISS yesterday  - hyperglycemia likely due to patient eating outside food and frequent snacks    Plan  - FS glu qAC and qHS  - low dose ISS  - titrate lantus  - consistent carb diet  - encourage patient to limit snacks

## 2023-01-17 NOTE — PROGRESS NOTE ADULT - ATTENDING COMMENTS
82 y.o. Male w/ hx HTN, DM2, hyperlipidemia recently diagnosed with metastatic AdenoCA of lung 12/2022 not on treatment yet p/w SOB, found to have AHRF 2/2 re-accumulation of exudative pleural effusion now s/p thoracentesis on 1/6 w 900 ml of exudative pleural fluid drained with hemorrhagic component. Pulm placed pleurex on 1/11, CXR improved on 1/11 with drain appropriately placed. Pulm recs to drain up to 1L every other day. Will need f/u in Pulm clinic and close Onc follow up. CM reports issues with insurance covering pleurex supplies and home nurse. Family can't afford the expense required for pleurex supplies, requiring every other day drainage. F/U SW recs. Patient with hyperglycemia; now on Lantus to 14 Units qHS.

## 2023-01-17 NOTE — PROGRESS NOTE ADULT - PROBLEM SELECTOR PLAN 3
- with 25 pack year history of cigarette smoking; quit ~ 30 years ago  - followed by [facility]    Plan  - Oncology consulted, no plan for chemotherapy while inpatient  - patient has not initiated treatment for lung cancer yet due to recent diagnosis  - son reports appointment scheduled on 1/19 to begin immunotherapy

## 2023-01-17 NOTE — PROGRESS NOTE ADULT - PROBLEM SELECTOR PLAN 4
- Sys BP elevated 140s-160s recently    Plan  - c/w amlodipine  - c/w lisinopril   - discontinue ASA 81 mg, pt was taking for primary ppx and denies hx of CAD/MI/CVA which would be class III recommendation.

## 2023-01-17 NOTE — PROGRESS NOTE ADULT - PROBLEM SELECTOR PLAN 6
- DVT ppx: lovenox 40 SQ daily  - Diet: consistent carb  - PT evaluation: home with no needs  - Dispo: pending course  - CM following, pt will need coverage for pleurx supplies - DVT ppx: lovenox 40 SQ daily  - Diet: consistent carb  - PT evaluation: home with no needs  - Dispo: pending course  - CM following, pt will need coverage for pleurx supplies    Son updated on phone 1/17.

## 2023-01-17 NOTE — PROGRESS NOTE ADULT - SUBJECTIVE AND OBJECTIVE BOX
Internal Medicine   Jillian Melgar | PGY-2    OVERNIGHT EVENTS: No overnight events      SUBJECTIVE: No complaints. Feels well with no fevers, chills, worsening dyspnea compared to baseline. PleurX pain well controlled.      MEDICATIONS  (STANDING):  amLODIPine   Tablet 10 milliGRAM(s) Oral daily  atorvastatin 20 milliGRAM(s) Oral at bedtime  dextrose 5%. 1000 milliLiter(s) (50 mL/Hr) IV Continuous <Continuous>  dextrose 5%. 1000 milliLiter(s) (100 mL/Hr) IV Continuous <Continuous>  dextrose 50% Injectable 25 Gram(s) IV Push once  dextrose 50% Injectable 12.5 Gram(s) IV Push once  dextrose 50% Injectable 25 Gram(s) IV Push once  enoxaparin Injectable 40 milliGRAM(s) SubCutaneous every 24 hours  glucagon  Injectable 1 milliGRAM(s) IntraMuscular once  insulin glargine Injectable (LANTUS) 14 Unit(s) SubCutaneous at bedtime  insulin lispro (ADMELOG) corrective regimen sliding scale   SubCutaneous three times a day before meals  insulin lispro (ADMELOG) corrective regimen sliding scale   SubCutaneous at bedtime  lisinopril 40 milliGRAM(s) Oral daily  melatonin 3 milliGRAM(s) Oral at bedtime  pantoprazole    Tablet 40 milliGRAM(s) Oral before breakfast    MEDICATIONS  (PRN):  dextrose Oral Gel 15 Gram(s) Oral once PRN Blood Glucose LESS THAN 70 milliGRAM(s)/deciliter        T(F): 97.8 (01-17-23 @ 05:11), Max: 97.8 (01-16-23 @ 22:03)  HR: 87 (01-17-23 @ 05:11) (87 - 96)  BP: 140/52 (01-17-23 @ 05:11) (139/73 - 143/59)  BP(mean): 73 (01-16-23 @ 13:10) (73 - 73)  RR: 19 (01-17-23 @ 05:11) (18 - 20)  SpO2: 98% (01-17-23 @ 05:11) (97% - 98%)    PHYSICAL EXAM:     HEENT: NCAT, conjunctiva clear, sclera anicteric, MMM  Neck: Supple, no JVD  CV: RRR, S1S2, no m/r/g, distant heart sounds  Resp: CTAB, normal respiratory effort, barrel chest  - pleurx catheter with serosanguinous fluid in tubing, mildly TTP at stoma, no edema/erythema; nontender to palpation  Abd: Soft, nontender, nondistended, normal bowel sounds  Ext: no edema, no clubbing or cyanosis  Neuro: AOx3, CN2-12 grossly intact, KELLEY  SKIN: warm, perfused    TELEMETRY:    LABS:                        11.4   8.69  )-----------( 218      ( 17 Jan 2023 05:55 )             33.0     01-17    135  |  99  |  31<H>  ----------------------------<  185<H>  4.6   |  24  |  0.94    Ca    9.4      17 Jan 2023 05:55  Phos  3.5     01-17  Mg     1.70     01-17              Creatinine Trend: 0.94<--, 1.05<--, 0.90<--, 0.98<--, 0.99<--, 1.05<--  I&O's Summary    BNP    RADIOLOGY & ADDITIONAL STUDIES:

## 2023-01-17 NOTE — PROGRESS NOTE ADULT - ASSESSMENT
82m with recently diagnosed NSCLC with malignant pleural effusion, no brain mets, PET/CT was done 1/3 but report is pending,  presenting with SOB in the setting of Pleff reaccumulation.     NGS with KRAS G12C mutation  PDL1 30%    Discussed treatment options including chemo/IO, IO alone, KRAS inhibitors.   Pt's son Prabhjot to speak to siblings about options  Pt and family are interested in starting immunotherapy as outpatient.   Will plan for this the week after discharge.      Now s/p Drainage of effusion, SOB improved.  Planned for pleurex placement.     1/11: s/p Left sided tunneled pleural catheter placed under ultrasound guidance. 600cc of blood fluid drained.  -Appreciate pulm input  -Will plan for immunotherapy as outpatient, as per patient son have appt with onc clinic on 1/19 with Dr Segovia to discuss treatment further  -Pt with metastatic bone disease, can consider bone modifying agents as outpatient but will need dental clearance especially given the dental disease shown on PET/CT.   -Albumin and Ca normal, will monitor  -No plan for inpatient chemo or immunotherapy  -Dispo pending arrangement of Plerx supplies at home, SW to coordinate  -Supportive care, pain control, Nutrition, PT, DVT ppx  -Outpatient oncology f/u      Case discussed with Dr. Majo ROSAS  Oncology Physician Assistant  [facility]  Pager [phone] also available on Teams    If before 8am/after 5pm or on weekends please page On-call Oncology Fellow

## 2023-01-18 NOTE — PROGRESS NOTE ADULT - PROBLEM SELECTOR PLAN 3
- with 25 pack year history of cigarette smoking; quit ~ 30 years ago  - followed by [facility]    Plan  - Oncology consulted, no plan for chemotherapy while inpatient  - patient has not initiated treatment for lung cancer yet due to recent diagnosis  - son reports appointment scheduled on 1/19 to begin immunotherapy - with 25 pack year history of cigarette smoking; quit ~ 30 years ago  - followed by [facility]    Plan  - Oncology consulted, no plan for chemotherapy while inpatient  - patient has not initiated treatment for lung cancer yet due to recent diagnosis  - son reports appointment scheduled on 1/19 to begin immunotherapy but difficulty to dispo given unable to procure PleurX supplies. Will reach out to onc in regards to rescheduling appointment or starting inpatient immunotherapy

## 2023-01-18 NOTE — PROGRESS NOTE ADULT - NUTRITIONAL ASSESSMENT
This patient has been assessed with a concern for Malnutrition and has been determined to have a diagnosis/diagnoses of Severe protein-calorie malnutrition.    This patient is being managed with:   Diet Consistent Carbohydrate/No Snacks-  Halal  Supplement Feeding Modality:  Oral  Glucerna Shake Cans or Servings Per Day:  1       Frequency:  Two Times a day  Entered: Jan 11 2023  2:27AM    

## 2023-01-18 NOTE — PROGRESS NOTE ADULT - SUBJECTIVE AND OBJECTIVE BOX
Internal Medicine   Meladomenica Melgar | PGY-2    OVERNIGHT EVENTS:      SUBJECTIVE:       MEDICATIONS  (STANDING):  amLODIPine   Tablet 10 milliGRAM(s) Oral daily  atorvastatin 20 milliGRAM(s) Oral at bedtime  dextrose 5%. 1000 milliLiter(s) (50 mL/Hr) IV Continuous <Continuous>  dextrose 5%. 1000 milliLiter(s) (100 mL/Hr) IV Continuous <Continuous>  dextrose 50% Injectable 25 Gram(s) IV Push once  dextrose 50% Injectable 12.5 Gram(s) IV Push once  dextrose 50% Injectable 25 Gram(s) IV Push once  enoxaparin Injectable 40 milliGRAM(s) SubCutaneous every 24 hours  glucagon  Injectable 1 milliGRAM(s) IntraMuscular once  insulin glargine Injectable (LANTUS) 14 Unit(s) SubCutaneous at bedtime  insulin lispro (ADMELOG) corrective regimen sliding scale   SubCutaneous three times a day before meals  insulin lispro (ADMELOG) corrective regimen sliding scale   SubCutaneous at bedtime  insulin lispro Injectable (ADMELOG) 4 Unit(s) SubCutaneous three times a day before meals  lisinopril 40 milliGRAM(s) Oral daily  melatonin 3 milliGRAM(s) Oral at bedtime  pantoprazole    Tablet 40 milliGRAM(s) Oral before breakfast    MEDICATIONS  (PRN):  dextrose Oral Gel 15 Gram(s) Oral once PRN Blood Glucose LESS THAN 70 milliGRAM(s)/deciliter        T(F): 97.5 (01-18-23 @ 05:24), Max: 97.9 (01-17-23 @ 15:40)  HR: 83 (01-18-23 @ 05:24) (83 - 96)  BP: 149/62 (01-18-23 @ 05:24) (138/68 - 163/75)  BP(mean): --  RR: 17 (01-18-23 @ 05:24) (17 - 18)  SpO2: 96% (01-18-23 @ 05:24) (94% - 97%)    PHYSICAL EXAM:     GENERAL: NAD, lying in bed comfortably  HEAD:  Atraumatic, Normocephalic  EYES: EOMI, PERRLA, conjunctiva and sclera clear, no nystagmus noted  ENT: Moist mucous membranes,   NECK: Supple, No JVD, trachea midline  CHEST/LUNG: Clear to auscultation bilaterally; No rales, rhonchi, wheezing, or rubs. Unlabored respirations  HEART: Regular rate and rhythm; No murmurs, rubs, or gallops, normal S1/S2  ABDOMEN: normal bowel sounds; Soft, nontender, nondistended, no organomegaly   EXTREMITIES:  2+ Peripheral Pulses, brisk capillary refill. No clubbing, cyanosis, or edema  MSK: No gross deformities noted   Neurological:  A&Ox3, no focal deficits   SKIN: No rashes or lesions  PSYCH: Normal mood, affect     TELEMETRY:    LABS:                        11.4   8.69  )-----------( 218      ( 17 Jan 2023 05:55 )             33.0     01-17    135  |  99  |  31<H>  ----------------------------<  185<H>  4.6   |  24  |  0.94    Ca    9.4      17 Jan 2023 05:55  Phos  3.5     01-17  Mg     1.70     01-17              Creatinine Trend: 0.94<--, 1.05<--, 0.90<--, 0.98<--, 0.99<--, 1.05<--  I&O's Summary    17 Jan 2023 07:01  -  18 Jan 2023 07:00  --------------------------------------------------------  IN: 0 mL / OUT: 530 mL / NET: -530 mL      BNP    RADIOLOGY & ADDITIONAL STUDIES:             Internal Medicine   Jillian Melgar | PGY-2    OVERNIGHT EVENTS: No overnight events      SUBJECTIVE: No complaints. Denies fevers, chills, dyspnea. Denies Pleurx site pain      MEDICATIONS  (STANDING):  amLODIPine   Tablet 10 milliGRAM(s) Oral daily  atorvastatin 20 milliGRAM(s) Oral at bedtime  dextrose 5%. 1000 milliLiter(s) (50 mL/Hr) IV Continuous <Continuous>  dextrose 5%. 1000 milliLiter(s) (100 mL/Hr) IV Continuous <Continuous>  dextrose 50% Injectable 25 Gram(s) IV Push once  dextrose 50% Injectable 12.5 Gram(s) IV Push once  dextrose 50% Injectable 25 Gram(s) IV Push once  enoxaparin Injectable 40 milliGRAM(s) SubCutaneous every 24 hours  glucagon  Injectable 1 milliGRAM(s) IntraMuscular once  insulin glargine Injectable (LANTUS) 14 Unit(s) SubCutaneous at bedtime  insulin lispro (ADMELOG) corrective regimen sliding scale   SubCutaneous three times a day before meals  insulin lispro (ADMELOG) corrective regimen sliding scale   SubCutaneous at bedtime  insulin lispro Injectable (ADMELOG) 4 Unit(s) SubCutaneous three times a day before meals  lisinopril 40 milliGRAM(s) Oral daily  melatonin 3 milliGRAM(s) Oral at bedtime  pantoprazole    Tablet 40 milliGRAM(s) Oral before breakfast    MEDICATIONS  (PRN):  dextrose Oral Gel 15 Gram(s) Oral once PRN Blood Glucose LESS THAN 70 milliGRAM(s)/deciliter        T(F): 97.5 (01-18-23 @ 05:24), Max: 97.9 (01-17-23 @ 15:40)  HR: 83 (01-18-23 @ 05:24) (83 - 96)  BP: 149/62 (01-18-23 @ 05:24) (138/68 - 163/75)  BP(mean): --  RR: 17 (01-18-23 @ 05:24) (17 - 18)  SpO2: 96% (01-18-23 @ 05:24) (94% - 97%)    PHYSICAL EXAM:     HEENT: NCAT, conjunctiva clear, sclera anicteric, MMM  Neck: Supple, no JVD  CV: RRR, S1S2, no m/r/g, distant heart sounds  Resp: CTAB, normal respiratory effort, barrel chest  - pleurx catheter with serosanguinous fluid in tubing, mildly TTP at stoma, no edema/erythema; nontender to palpation  Abd: Soft, nontender, nondistended, normal bowel sounds  Ext: no edema, no clubbing or cyanosis  Neuro: AOx3, CN2-12 grossly intact, KELLEY  SKIN: warm, perfused    TELEMETRY:    LABS:                        11.4   8.69  )-----------( 218      ( 17 Jan 2023 05:55 )             33.0     01-17    135  |  99  |  31<H>  ----------------------------<  185<H>  4.6   |  24  |  0.94    Ca    9.4      17 Jan 2023 05:55  Phos  3.5     01-17  Mg     1.70     01-17              Creatinine Trend: 0.94<--, 1.05<--, 0.90<--, 0.98<--, 0.99<--, 1.05<--  I&O's Summary    17 Jan 2023 07:01  -  18 Jan 2023 07:00  --------------------------------------------------------  IN: 0 mL / OUT: 530 mL / NET: -530 mL      BNP    RADIOLOGY & ADDITIONAL STUDIES:

## 2023-01-18 NOTE — PROGRESS NOTE ADULT - REASON FOR ADMISSION
Pleural effusion
normal...
Well appearing, well nourished, awake, alert, oriented to person, place, time/situation and in no apparent distress.

## 2023-01-18 NOTE — PROGRESS NOTE ADULT - ATTENDING COMMENTS
Patient seen and examined, care plan discussed with house staff as above.    See d/c note. Pt prepped for d/c, sending  home on lisinopril, amlodipine, metformin and close follow up with Onc for chemo initiation. Family counseled on how to drain pleurex and medical supplies arranged to go home with by CM.

## 2023-01-18 NOTE — PROVIDER CONTACT NOTE (OTHER) - ACTION/TREATMENT ORDERED:
MD notified and aware. Continue to Monitor. No interventions need.
MD notified and aware. Gives the bed time lantus and insulin based on sliding scale for 392.

## 2023-01-18 NOTE — PROVIDER CONTACT NOTE (OTHER) - ASSESSMENT
Patient is A&O times 4. No acute stress noted. Patient denied any S/S related to hyperglycemia.
Patient is A&O times 4. No acute stress noted. Patient denied any symptoms. VS: BP: 140/52 (low DBP), HR: 87, temp: 97.8, RR:19, and Spo2: 98%

## 2023-01-18 NOTE — PROGRESS NOTE ADULT - PROBLEM SELECTOR PLAN 6
- DVT ppx: lovenox 40 SQ daily  - Diet: consistent carb  - PT evaluation: home with no needs  - Dispo: pending course  - CM following, pt will need coverage for pleurx supplies    Son updated on phone 1/17.

## 2023-01-18 NOTE — PROGRESS NOTE ADULT - ATTENDING SUPERVISION STATEMENT
Fellow
Fellow
Resident

## 2023-01-18 NOTE — PROGRESS NOTE ADULT - PROBLEM SELECTOR PROBLEM 1
Pleural effusion, left
Pleural effusion, left
Acute dyspnea
Pleural effusion, left

## 2023-01-18 NOTE — PROGRESS NOTE ADULT - PROBLEM SELECTOR PROBLEM 2
Acute respiratory failure with hypoxia
Pleural effusion, left
Acute respiratory failure with hypoxia

## 2023-01-18 NOTE — PROGRESS NOTE ADULT - PROBLEM SELECTOR PLAN 5
- reportedly w/ FS 72 since admission and given juice, sandwich, but w/ repeat FS = 77  - Hb A1c 5.7, prediabetic, repeat A1c 6.6 on 1/12  - patient with AM glu 236 today, daytime up to 300s, required 14 units ISS yesterday  - hyperglycemia likely due to patient eating outside food and frequent snacks    Plan  - FS glu qAC and qHS  - low dose ISS  - titrate lantus  - consistent carb diet  - encourage patient to limit snacks - reportedly w/ FS 72 since admission and given juice, sandwich, but w/ repeat FS = 77  - Hb A1c 5.7, prediabetic, repeat A1c 6.6 on 1/12  - patient with AM glu 236 today, daytime up to 300s, required 14 units ISS yesterday  - hyperglycemia likely due to patient eating outside food and frequent snacks    Plan  - FS glu qAC and qHS  - low dose ISS  - titrate lantus/admelog  - consistent carb diet  - encourage patient to limit snacks

## 2023-01-18 NOTE — DISCHARGE NOTE NURSING/CASE MANAGEMENT/SOCIAL WORK - PATIENT PORTAL LINK FT
You can access the FollowMyHealth Patient Portal offered by [facility] by registering at the following website: [email or web address]. By joining [facility]’s FollowMyHealth portal, you will also be able to view your health information using other applications (apps) compatible with our system.

## 2023-01-18 NOTE — PROVIDER CONTACT NOTE (OTHER) - BACKGROUND
Patient admitted for pleural effusion, with pmh of type 2 diabetes, HTN, and lung cancer.
Patient admitted for left pleural effusion with pmh of lung cancer, galt difficulty, and type 2 diabetes.

## 2023-01-18 NOTE — PROGRESS NOTE ADULT - PROBLEM SELECTOR PLAN 1
- initially presented with 1 week of worsening JERNIGAN, orthopnea  - diagnosed with stage IV lung adenocarcinoma during recent admission 12/5-12/17  - thoracentesis on 12/12/22 with associated malignant pleural effusion  - CXR with TAVON mass, "moderate left pleural effusion with associated passive atelectasis"    Plan  - S/p L sided therapeutic thoracentesis on 1/6 (900 mL removed)       - exudative by Light's criteria with fluid LDH 1389, likely 2/2 NSCLC  - S/p indwelling pleural catheter placement 1/11 with 600 cc removed       - no signs of bleeding, worsening oxygenation, infection from catheter site  - Appreciate Pulm recs to drain up to 1L every other day  - Daughter educated by RNs at bedside on how to drain pleurx catheter on 1/13  - AN is following to set up home care for pleurx/education for family members - initially presented with 1 week of worsening JERNIGAN, orthopnea  - diagnosed with stage IV lung adenocarcinoma during recent admission 12/5-12/17  - thoracentesis on 12/12/22 with associated malignant pleural effusion  - CXR with TAVON mass, "moderate left pleural effusion with associated passive atelectasis"    Plan  - S/p L sided therapeutic thoracentesis on 1/6 (900 mL removed)       - exudative by Light's criteria with fluid LDH 1389, likely 2/2 NSCLC  - S/p indwelling pleural catheter placement 1/11 with 600 cc removed       - no signs of bleeding, worsening oxygenation, infection from catheter site; drain every other day up to 1L  - Appreciate Pulm recs to drain up to 1L every other day  - Daughter educated by RNs at bedside on how to drain pleurx catheter on 1/13  - AN is following to set up home care for pleurx/education for family members

## 2023-01-18 NOTE — PROGRESS NOTE ADULT - PROBLEM SELECTOR PROBLEM 3
Lung cancer
Dehydration
Lung cancer

## 2023-02-08 NOTE — HISTORY OF PRESENT ILLNESS
[de-identified] : 82m with newly diagnosed adenocarcinoma of the lung with malignant pleural effusion, presenting for oncology evaluation. \par \par Pt has a h/o DMII, type 2 DM, HTN, HLD, prior dengue infection (2019), and remote smoking history (20 pack years, quit 35 years ago), he presented to the ER with 3 weeks of progressive shortness of breath found to have large left sided pleural effusion. He was discharged on 12/16/22. \par S/p thoracentesis with 2L of cloudy serous fluid removed. \par Repeat CT chest showing large left upper lobe mass with areas of loculated pleural effusion \par CT abd/pelvis without evidence of metastatic disease. \par Cytopathology showing metastatic adenocarcinoma- Negative for TTF1, p40, , calretinin and WT1 \par \par He denies pain, fever, cough. He has JERNIGAN. \par \par Today, he presents with his son, Prabhjot. He is primarily Pashto speaking. 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 \par His appetite is OK and weight is stable. \par He spends his days at home, mostly in bed. \par He has a good social support system and children and other family members live close by.\par \par  [de-identified] : Feels well and has no new complaints. \par Has been eating better at home since dc from [facility] \par Breathing is stable, no SOB. has JERNIGAN.

## 2023-02-08 NOTE — ASSESSMENT
[FreeTextEntry1] : 82m with recently diagnosed adenocarcinoma of lung, presenting for an oncology eval. \par Work up to date and disease course was discussed with patient and his son Prabhjot in detail. \par \par NGS with a KRAS G12C mutation, TKIs are indicated in the second line. \par PDL1 is 30%. \par Given age and poor PS, would not recommend chemotherapy. Will start treatment with single agent keytruda. Can add carbo/alimta or switch to sotorasib in case of poor response, if PS permits.\par \par Prognosis was discussed in detail. \par All questions answered.\par Discussed the natural course of disease, rationale for treatment and treatment expectation and the incurable       nature of the disease. \par All side effects, risks and benefits were discussed in detail, hand outs detailing the treatment drugs and their side effects were provided, and all questions were answered to the apparent satisfaction of the patient. Consent to start treatment was signed by patient and witnessed by his son. \par \par -To start keytruda 200mg [id number]\par -Labs today\par -CT scans after 4 cycles\par -Follow Pulm\par -RTC for C2\par \par Vivi Segovia MD\par Medical Oncology and Hematology\par \par Total time of this visit, including time spent face to face with patient and/or via video/audio, and also in preparing for today's visit for MDM and documentation (Medical Decision Making, including consideration of possible diagnoses, management options, complex medical record review, review of diagnostic tests and information, consideration and discussion of significant complications based on comorbidities, and discussion with providers involved with the care of the patient) 40 minutes.\par \par

## 2023-02-08 NOTE — PHYSICAL EXAM
[Ambulatory and capable of all self care but unable to carry out any work activities] : Status 2- Ambulatory and capable of all self care but unable to carry out any work activities. Up and about more than 50% of waking hours [Normal] : affect appropriate [de-identified] : Decrease breath sounds on the left side up to the mid lung field

## 2023-02-10 NOTE — HISTORY OF PRESENT ILLNESS
[de-identified] : 82m with newly diagnosed adenocarcinoma of the lung with malignant pleural effusion, presenting for oncology evaluation. \par \par Pt has a h/o DMII, type 2 DM, HTN, HLD, prior dengue infection (2019), and remote smoking history (20 pack years, quit 35 years ago), he presented to the ER with 3 weeks of progressive shortness of breath found to have large left sided pleural effusion. He was discharged on 12/16/22. \par S/p thoracentesis with 2L of cloudy serous fluid removed. \par Repeat CT chest showing large left upper lobe mass with areas of loculated pleural effusion \par CT abd/pelvis without evidence of metastatic disease. \par Cytopathology showing metastatic adenocarcinoma- Negative for TTF1, p40, , calretinin and WT1 \par \par He denies pain, fever, cough. He has JERNIGAN. \par Was hospitalized with SOB and pleurx was placed. \par \par He presents with his son, Prabhjot. He is primarily Kazakh speaking. 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 \par His appetite is OK and weight is stable. \par He spends his days at home, mostly in bed. \par He has a good social support system and children and other family members live close by.\par \par  [de-identified] : Pleural effusion has decreased and pt requires weekly drainage now. \par Tolerated C1 IO well, no side effects. \par Denies diarrhea, n/v, worsening cough or SOB. Has baseline JERNIGAN.\par Denies joint pain, rash

## 2023-02-10 NOTE — ASSESSMENT
[FreeTextEntry1] : 82m with recently diagnosed adenocarcinoma of lung, presenting for an oncology eval. \par Work up to date and disease course was discussed with patient and his son Prabhjot in detail. \par \par NGS with a KRAS G12C mutation, TKIs are indicated in the second line. \par PDL1 is 30%. \par Given age and poor PS, would not recommend chemotherapy. Will start treatment with single agent keytruda. Can add carbo/alimta or switch to sotorasib in case of poor response, if PS permits.\par \par -C2 keytruda 200mg today\par -Labs today\par -CT scans after 3-4 cycles\par -Follow Pulm\par -RTC for C3\par \par Vivi Segovia MD\par Medical Oncology and Hematology\par \par Total time of this visit, including time spent face to face with patient and/or via video/audio, and also in preparing for today's visit for MDM and documentation (Medical Decision Making, including consideration of possible diagnoses, management options, complex medical record review, review of diagnostic tests and information, consideration and discussion of significant complications based on comorbidities, and discussion with providers involved with the care of the patient) 40 minutes.\par \par

## 2023-02-10 NOTE — PHYSICAL EXAM
[Ambulatory and capable of all self care but unable to carry out any work activities] : Status 2- Ambulatory and capable of all self care but unable to carry out any work activities. Up and about more than 50% of waking hours [Normal] : affect appropriate [de-identified] : Decrease breath sounds on the left side

## 2023-02-13 PROBLEM — R06.02 SHORTNESS OF BREATH ON EXERTION: Status: ACTIVE | Noted: 2022-01-01

## 2023-02-13 PROBLEM — J91.0 MALIGNANT PLEURAL EFFUSION: Status: ACTIVE | Noted: 2022-01-01

## 2023-02-13 PROBLEM — C34.90 LUNG CANCER: Status: ACTIVE | Noted: 2022-01-01

## 2023-02-13 PROBLEM — C34.92 ADENOCARCINOMA OF LEFT LUNG, STAGE 4: Status: ACTIVE | Noted: 2022-01-01

## 2023-02-13 NOTE — ASSESSMENT
[FreeTextEntry1] : 83YO Male PMH of DMII, type 2 DM, HTN, HLD, prior dengue infection (2019), recently admitted 12/22 with SOB found to have CT chest large left sided mass with large left sided effusion s/p thoracentesis with cytopathology suggestive of metastatic lung adenocarcinoma (on keytruda) who presents to Interventional pulmonary clinic for eval of left sided effusion s/p Left sided PleurX catheter placed 1/11/23\par \par #Left sided Malignant effusion s/p PleurX TIPC 1/11/23\par - Pt's son reports reduced output draining 250cc 2x a week\par - Minimal fluid noted on POCUS in office today\par - Sutures removed, site looks clean dry and intact\par - Can reduce frequency of drainage to 1x weekly\par \par  Catheter protocols reviewed. All information provided. Adequate equipment available. Advised to call if experiencing any pain with drainage. Advised to call if any change in color of fluid or erythema or swelling at catheter site or experiencing systemic signs and symptoms of infection such as fever. Advised to call if any sudden worsening of shortness of breath. Catheter care instructions provided.\par \par \par RTC in 6 weeks\par \par Case discussed with Dr. Morales

## 2023-02-13 NOTE — REASON FOR VISIT
[Follow-Up - From Hospitalization] : a follow-up visit after a recent hospitalization [TextBox_44] : Interventional Pulmonary

## 2023-02-13 NOTE — PROCEDURE
[FreeTextEntry1] : Thoracic/Chest Ultrasound\par Indication: Pleural Effusion\par :  Andrew Frank\par \par Findings: \par Small to trace left sided pleural effusion\par No Right sided effusion\par \par Interpretation:\par Reduced left sided pleural effusion\par \par Images uploaded to path.

## 2023-02-13 NOTE — PHYSICAL EXAM
[No Acute Distress] : no acute distress [Normal Appearance] : normal appearance [Normal Rate/Rhythm] : normal rate/rhythm [Normal S1, S2] : normal s1, s2 [No Resp Distress] : no resp distress [No Edema] : no edema [No Focal Deficits] : no focal deficits [Oriented x3] : oriented x3 [No Acc Muscle Use] : no acc muscle use [Normal Rhythm and Effort] : normal rhythm and effort [Normal to Percussion] : normal to percussion [TextBox_68] : Diminished bibasilar breath sounds

## 2023-02-13 NOTE — HISTORY OF PRESENT ILLNESS
[TextBox_4] : Interventional Pulmonology Consultation/Visit Note\par \par 81YO Male PMH of DMII, type 2 DM, HTN, HLD, prior dengue infection (2019), recently admitted 12/22 with SOB found to have CT chest large left sided mass with large left sided effusion s/p thoracentesis with cytopathology suggestive of metastatic lung adenocarcinoma who presents to Interventional pulmonary clinic for eval of left sided effusion s/p Left sided PleurX catheter placed 1/11/23. \par \par Pt is present with his son who he prefers to translate.\par \par He initially was admitted and found to have complete left lung atelectasis on CT chest with large left sided pleural effusion.\par CT chest showing large left upper lobe mass 5x4.3cm TAVON mass. CT abd/pelvis without evidence of metastatic disease.\par Cytopathology showing metastatic adenocarcinoma- Negative for TTF1, p40, , calretinin and WT1 Despite TTF1 negative immunostain pathology believes due to size of TAVON lung.\par \par PDL1 Tumor Troportion score 30%.\par \par He started had his first Keytruda dose 1/19/23\par Pt had Left sided PleurX catheter placed 1/11/23. Since that time he has been draining initially 3 times a week however fluid amount started to decrease so reduced frequency to 2x weekly. Most recently has been draining 250 each time. Last was 250cc 3 days ago. \par He denies fevers, chills, or drainage from around the site.

## 2023-02-20 ENCOUNTER — LABORATORY RESULT (OUTPATIENT)
Age: 83
End: 2023-02-20

## 2023-02-22 ENCOUNTER — OUTPATIENT (OUTPATIENT)
Dept: OUTPATIENT SERVICES | Facility: HOSPITAL | Age: 83
LOS: 1 days | Discharge: HOME | End: 2023-02-22

## 2023-02-22 DIAGNOSIS — Z98.890 OTHER SPECIFIED POSTPROCEDURAL STATES: Chronic | ICD-10-CM

## 2023-02-22 DIAGNOSIS — C34.10 MALIGNANT NEOPLASM OF UPPER LOBE, UNSPECIFIED BRONCHUS OR LUNG: ICD-10-CM

## 2023-03-01 ENCOUNTER — NON-APPOINTMENT (OUTPATIENT)
Age: 83
End: 2023-03-01

## 2023-03-02 ENCOUNTER — APPOINTMENT (OUTPATIENT)
Dept: HEMATOLOGY ONCOLOGY | Facility: CLINIC | Age: 83
End: 2023-03-02

## 2023-03-02 ENCOUNTER — APPOINTMENT (OUTPATIENT)
Dept: INFUSION THERAPY | Facility: HOSPITAL | Age: 83
End: 2023-03-02

## 2023-03-20 ENCOUNTER — APPOINTMENT (OUTPATIENT)
Dept: PULMONOLOGY | Facility: CLINIC | Age: 83
End: 2023-03-20

## 2023-03-23 ENCOUNTER — APPOINTMENT (OUTPATIENT)
Dept: INFUSION THERAPY | Facility: HOSPITAL | Age: 83
End: 2023-03-23

## 2023-06-28 NOTE — PROVIDER CONTACT NOTE (OTHER) - ACTION/TREATMENT ORDERED:
Anesthesia Transfer of Care Note    Patient: Kerwin Wilsno    Procedure(s) Performed: EGD    Patient location: GI    Anesthesia Type: general    Transport from OR: Transported from OR on room air with adequate spontaneous ventilation. Continuous ECG monitoring in transport. Continuous SpO2 monitoring in transport    Post pain: adequate analgesia    Post assessment: no apparent anesthetic complications    Post vital signs: stable    Level of consciousness: responds to stimulation, awake and sedated    Nausea/Vomiting: no nausea/vomiting    Complications: none    Transfer of care protocol was followed      Last vitals:   Visit Vitals  /76 (BP Location: Left arm, Patient Position: Lying)   Pulse 99   Temp 36.8 °C (98.2 °F) (Oral)   Resp 20   Ht 6' (1.829 m)   Wt 68 kg (150 lb)   SpO2 99%   BMI 20.34 kg/m²     
Nothing to do at this time as micaela CAMARGO
Provider will come speak to Pt's son as per provider
As per MD, will come speak to Pt

## 2024-05-10 NOTE — ED ADULT NURSE NOTE - CHIEF COMPLAINT QUOTE
Called patient to schedule FU appointment.. left VM to call us back and schedule    p/t living with DM type2, c.o of lt sided chest discomfort and sob for few days, p/t fell one week ago and hurt his ribs as well, appears tachypneic and uncomfortable

## 2024-11-27 NOTE — PROGRESS NOTE ADULT - ASSESSMENT
Dietary Assessment Note    This eval was conducted via phone on 11/27/24 in preparation for their visit on 12/3/24 with Dr. Kelly.     Vitals: There were no vitals filed for this visit. Patient lost 25 lbs over 2 months per self reported weight of 173#.    Total Weight Loss: 54 lbs    Labs reviewed: no lab studies available for review at time of visit    Protein intake: <60 grams/day     Fluid intake: 48-64 oz/day    Multivitamin/mineral intake: yes switched to multivitamin prescribed from PCP - discussed taking 2     Calcium intake: yes 2 soft chews    Other: iron    Exercise: yes 45 minutes 3 days per week at home workouts     Nutrition Assessment: 4 months post-op visit.     Breakfast: HB eggs OR 1/2 croissant with PB OR skips  Snack: none  Lunch: celery, carrots with 1/2 turkey sandwich  Snack: protein bar  Dinner: salad with chicken, ff ranch   Snack: none     Amount able to eat per sitting: ~1 cup total    Following 30/30/30 rule: yes     Food Intolerances/issues: none    Client Concerns: none    Goals:   - focus on 60-80g protein   - don't skip breakfast  - reduce portion sizes to 1/2-3/4 cup total at meals    Plan: Follow up at 6 months post op and as needed    Aretha Braxton RD, LD   The patient is an 82-year-old man with PMH of T2DM, HTN, HLD, and previous dengue fever who was recently diagnosed with stage IV adenocarcinoma of the left lung with recurrent left malignant pleural effusion and home O2 use during hospital admission in December 2022. He is currently admitted for evaluation and management of shortness of breath secondary to recurrence of his malignant effusion. At this time, his dyspnea has improved following L sided therapeutic thoracentesis (900 mL) on 1/6. He is pending placement of an indwelling pleural drain by the interventional pulmonology team on 1/11 (today). He was cleared by the dental team yesterday with no loose teeth noted. Oncology following as well, recommending outpatient follow-up on Thurs 1/19 to initiate immunotherapy for NSCLC. 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 He is currently admitted for evaluation and management of shortness of breath secondary to recurrence of his malignant effusion. At this time, his dyspnea has improved following L sided therapeutic thoracentesis (900 mL) on 1/6. He is s/p placement of an indwelling pleural drain by the interventional pulmonology team on 1/11 with 600 cc fluid removed. Now pending coordination of home care and education for family about pleurx catheter. Oncology following as well, recommending outpatient follow-up on Thurs 1/19 to initiate immunotherapy for NSCLC.

## (undated) DEVICE — SENSOR O2 FINGER NEO/ADLT 24/BX 6BX/CA

## (undated) DEVICE — TUBING SUCTION NONCONDUCTIVE 6MM X 12FT

## (undated) DEVICE — NDL HYPO SAFE 18G X 1.5" (PINK)

## (undated) DEVICE — DRAPE TOWEL BLUE 17" X 24"

## (undated) DEVICE — SUT SILK 2-0 18" FS

## (undated) DEVICE — PREP CHLORAPREP HI-LITE ORANGE 26ML

## (undated) DEVICE — MASK SURGICAL WITH EYESHIELD ANTIFOG (ORANGE)

## (undated) DEVICE — DRAIN PLEURX KIT WITH 500ML VACUUM BOTTLE

## (undated) DEVICE — LABELS BLANK W PEN

## (undated) DEVICE — MARKING PEN W RULER

## (undated) DEVICE — DRSG CURITY GAUZE SPONGE 4 X 4" 12-PLY

## (undated) DEVICE — NDL HYPO REGULAR BEVEL 25G X 1.5" (BLUE)

## (undated) DEVICE — GLV 7 PROTEXIS (WHITE)

## (undated) DEVICE — GOWN LG

## (undated) DEVICE — PREP ALCOHOL PAD MED

## (undated) DEVICE — SYR LUER LOK 10CC

## (undated) DEVICE — WARMING BLANKET LOWER ADULT

## (undated) DEVICE — DRAPE ULTRASOUND PROBE COVER W ULTRA GEL 18X120CM

## (undated) DEVICE — TUBING CANNULA SALTER LABS NASAL ADULT 7FT

## (undated) DEVICE — DRSG DERMABOND 0.7ML

## (undated) DEVICE — DRAPE 1/2 SHEET 40X57"